# Patient Record
Sex: MALE | Race: WHITE | NOT HISPANIC OR LATINO | Employment: OTHER | URBAN - METROPOLITAN AREA
[De-identification: names, ages, dates, MRNs, and addresses within clinical notes are randomized per-mention and may not be internally consistent; named-entity substitution may affect disease eponyms.]

---

## 2017-01-04 ENCOUNTER — TELEPHONE (OUTPATIENT)
Dept: HEPATOLOGY | Facility: CLINIC | Age: 62
End: 2017-01-04

## 2017-01-04 NOTE — TELEPHONE ENCOUNTER
----- Message from Shivani Monzon sent at 1/3/2017  3:00 PM CST -----  Contact: patient   Patient calling to reschedule his appt to see John in march in  [place]. Please call

## 2017-01-04 NOTE — TELEPHONE ENCOUNTER
MA spoke with patient requesting appt in March. Appt 01/12/2017 cancelled. Pt name put on waiting list for March.

## 2017-03-06 ENCOUNTER — TELEPHONE (OUTPATIENT)
Dept: HEPATOLOGY | Facility: CLINIC | Age: 62
End: 2017-03-06

## 2017-03-06 NOTE — TELEPHONE ENCOUNTER
Received call from pt's wife requesting consult in [facility] for March. They cancelled appt in Jan 2017.  Records are scanned in media. Pre-visit tests done. Please assist with scheduling.

## 2017-03-06 NOTE — TELEPHONE ENCOUNTER
MA spoke with pt's wife, follow up appt schedule 04/13/2017 @ 1pm. Patient address verified, appt letter and questionnaire  mailed to patient.

## 2017-03-07 NOTE — TELEPHONE ENCOUNTER
Noted. Spoke to pt. Since last labs done in 8/2016, will mail him a new order for CBC, CMP, INR, and Fibrosure.  Pt will have labs done at [facility] in [place] at least 2-3 weeks before consult.

## 2017-03-30 NOTE — TELEPHONE ENCOUNTER
CBC and CMP received from [facility]. INR was not done. Quantity was insufficient for Fibrosure.  Results placed in file along with other external records.

## 2017-04-13 ENCOUNTER — INITIAL CONSULT (OUTPATIENT)
Dept: HEPATOLOGY | Facility: CLINIC | Age: 62
End: 2017-04-13
Payer: COMMERCIAL

## 2017-04-13 DIAGNOSIS — B18.2 CHRONIC HEPATITIS C WITHOUT HEPATIC COMA: ICD-10-CM

## 2017-04-13 DIAGNOSIS — I25.10 CORONARY ARTERY DISEASE INVOLVING NATIVE CORONARY ARTERY OF NATIVE HEART WITHOUT ANGINA PECTORIS: ICD-10-CM

## 2017-04-13 DIAGNOSIS — J43.8 OTHER EMPHYSEMA: ICD-10-CM

## 2017-04-13 PROCEDURE — 1160F RVW MEDS BY RX/DR IN RCRD: CPT | Mod: S$GLB,,, | Performed by: INTERNAL MEDICINE

## 2017-04-13 PROCEDURE — 99205 OFFICE O/P NEW HI 60 MIN: CPT | Mod: S$GLB,,, | Performed by: INTERNAL MEDICINE

## 2017-04-13 NOTE — Clinical Note
Recommendations: -  Labs today: AFP, HBcAb, HBsAg, HBsAb, vaccinate if not immune. -  Ultrasound of the abdomen and elastography to be done in ultrasound at main campus.  -  Will wait for new hep C drugs to become available and discuss again with patient after speaking to pulmonologists.  -  Will call Dr Filipe Grijalva [phone] to discuss prognosis of COPD -  Will request assessment of prognosis from Dr Tatianna Garcia as well.  -  Return in 1 month

## 2017-04-13 NOTE — LETTER
April 16, 2017      Julian Schulz MD  [facility] [address]           [place] - Hepatology  [address]  [facility] [address]  Phone: [phone]          Patient: Shaan Brown   MR Number: [id number]   YOB: 1955   Date of Visit: 4/13/2017       Dear Dr. Julian Schulz:    Thank you for referring Shaan Brown to me for evaluation. Attached you will find relevant portions of my assessment and plan of care.    If you have questions, please do not hesitate to call me. I look forward to following Shaan Brown along with you.    Sincerely,    Ramila Lopez MD    Enclosure  CC:  No Recipients    If you would like to receive this communication electronically, please contact [email or web address] or [phone] to request more information on PostedIn Link access.    For providers and/or their staff who would like to refer a patient to [facility], please contact us through our one-stop-shop provider referral line, [facility], at [phone].    If you feel you have received this communication in error or would no longer like to receive these types of communications, please e-mail [email or web address]

## 2017-04-13 NOTE — Clinical Note
Labs: CBC, CMP, AFP, HBcAb, HBsAg, HBsAb, vaccinate if not immune. Get labs same day as u/s visit at main campus.

## 2017-04-16 VITALS
HEIGHT: 71 IN | SYSTOLIC BLOOD PRESSURE: 102 MMHG | BODY MASS INDEX: 41.72 KG/M2 | WEIGHT: 298 LBS | DIASTOLIC BLOOD PRESSURE: 63 MMHG | HEART RATE: 88 BPM

## 2017-04-16 PROBLEM — J44.9 COPD (CHRONIC OBSTRUCTIVE PULMONARY DISEASE): Status: ACTIVE | Noted: 2017-04-16

## 2017-04-16 PROBLEM — I25.10 CAD (CORONARY ARTERY DISEASE): Status: ACTIVE | Noted: 2017-04-16

## 2017-04-16 PROBLEM — B18.2 CHRONIC HEPATITIS C: Status: ACTIVE | Noted: 2017-04-16

## 2017-04-16 NOTE — PROGRESS NOTES
"   [facility] Consultation Note    Reason for Consult:  The encounter diagnosis was Chronic hepatitis C without hepatic coma.    PCP: Mendel Ferguson [address] / [facility] [address]    HPI:  This is a 61 y.o. male here for evaluation of: Candidacy for treatment of Chronic hepatitis C    This is a 61-year-old  male with chronic hepatitis C, genotype 1A, HCV RNA 9.5 million international units per mL on September 13, 2016.  He is known to have hepatitis C for more than 10 years, Patient states he acquired hep C from IV drug use.  He remains treatment naïve because his doctors have been reluctant to treat him due to his multiple comorbidities.  They have felt his pulmonary status could be worsened by hep C treatment.  Also, his hep C could outlast his lung disease.  Patient states his GI doctor Ashish, has told him "hep C treatment will kill him", and his lung doctor Jorje Grijalva ([phone]) has told him that his "lung disease will kill him before his liver disease".      Patient's platelet count was 282 on August 16, 2016 and INR was 1.1 on 8/16/2016.    U/S abd on 9/8/16 showed coarse hepatic echotexture, but no focal mass was seen.  " The visualized portions of systems being are unremarkable".  The hepatic and portal veins are patent.  Size of the spleen was not mentioned possibly because it was a limited ultrasound of the right upper quadrant.    HCV fibrosure test on August the 30, 2016: Score could not be calculated due to high or low value for one or more of the biochemical analytes.  This was bilirubin of 2.0    In addition, he was recently on 9/13/2016, tested for NS5A resistance with result of "resistance possible" to 4 out of 5 available NS5A inhibitors (L31L/M SHAUNA to Daclatasvir, elbasvir, Ledipasvir, Ombitasvir, (not determined against Velpatasvir).    Thus, it will be challenging to treat with currently available drugs.  He would have to wait for new drugs to be approved " which have high efficacy against NS5A RAVs.      Comorbidities include:   -  Coronary artery disease, s/p quadruple coronary artery bypass graft surgery at age 44, and coronary stent placement after a heart attack 2016 at age 61 (went to the ER with palpitations, went into shock, had angiogram which showed 90% blockage of his coronary artery, had a syncopal episode while in the hospital, was resuscitated with chest compressions, and a coronary stent was placed).  During this admission, he was seen by Dr Schulz who did EGD on him, told him he had gastritis, no varices.  Dr Schulz sent him here to see if he could get his hep C treated.   -  COPD diagnosed at age 54, on 24-hour nasal O2, sleeps with BiPAP and oxygen,  he was seen by Dr. Tatianna Garcia on May 13, 2015 for his COPD and shortness of breath, she mentions diastolic dysfunction as well in her note.  She had 6 minute walk test, however, based on Dr. Yasmin Narayanan's note same-day, this test was negative not attempted because prior to walking the patient reported that he had pain in his feet secondary to injections.  His preexercise resting O2 saturation was 80% on 2 L/m of supplemental oxygen, his heart rate was 51 bpm his blood pressure was 99/61.    -  Chronic smoker up to 4 packs a day for 40 years, has stopped smoking.      Elevated liver enzymes: No  Abnormal imaging: No  Cirrhosis: No  Hepatitis C: Yes  Hepatitis B: No  Fatty liver: No  Encephalopathy: No  Post-hospital discharge: No  Symptoms: none related to liver.  Has multiple symptoms related to COPD.     Primary hepatic manifestations:  Fatigue:Yes  Edema:No  Ascites:No  Encephalopathy:No  Abdominal pain:No  GI bleeds: No  Pruritus:No  Weight Changes:lost a few lbs  Changes in Bowel habits: No  Muscle cramps:No    Risk factors for liver disease:  No jaundice  No transfusions  H/o IVDU  Did not snort cocaine or similar agents  Did not live with anyone with hepatitis B or C  Sexual partner not  tested  No hepatotoxic medications  No exposure to industrial toxins  Alcohol: none      ROS:  Constitutional: No fevers, chills, weight down a few pounds, has fatigue  ENT: No allergies, nosebleeds,   CV: No chest pain  Pulm: No cough, has shortness of breath  Ophtho: No vision changes  GI/Liver: see HPI  Derm: No rash, itching  Heme: No swollen glands, bruising  MSK: No joint pains, joint swelling  : No dysuria, hematuria, decrease in urine output  Endo: No hot or cold intolerance  Neuro: No confusion, disorientation, difficulty with sleep, memory, concentration, syncope, seizure  Psych: No anxiety, depression    Medical History:  has a past medical history of Angina, class III; Atherosclerosis of lower extremity with claudication; Bilateral carotid artery stenosis; CAD (coronary artery disease); CAD (coronary artery disease) of artery bypass graft; Chronic hepatitis C; COPD (chronic obstructive pulmonary disease); Hepatitis; HHD (hypertensive heart disease); Hypercholesteremia; Hypoxia; Neuropathy, lower extremity; Obstructive sleep apnea; PAF (paroxysmal atrial fibrillation); Presence of stent in artery; and Syncope.    Surgical History:  has a past surgical history that includes Coronary artery bypass graft; stent in artery; Colonoscopy; and Upper gastrointestinal endoscopy.    Family History: family history includes Liver disease in his father and sister..     Social History:  reports that he quit smoking about 7 years ago. His smoking use included Cigarettes. He has a 160.00 pack-year smoking history. He does not have any smokeless tobacco history on file. He reports that he does not drink alcohol.    Review of patient's allergies indicates:  No Known Allergies    Current Outpatient Prescriptions   Medication Sig    alprazolam (XANAX) 1 MG tablet     cilostazol (PLETAL) 100 MG Tab     clopidogrel (PLAVIX) 75 mg tablet     fluticasone-salmeterol 500-50 mcg/dose (ADVAIR DISKUS) 500-50 mcg/dose DsDv diskus  "inhaler Inhale 1 puff into the lungs 2 (two) times daily.    furosemide (LASIX) 20 MG tablet     gabapentin (NEURONTIN) 300 MG capsule     hydrocodone-acetaminophen 10-325mg (NORCO)  mg Tab     isosorbide mononitrate (IMDUR) 60 MG 24 hr tablet     mupirocin calcium 2% (BACTROBAN) 2 % cream     pantoprazole (PROTONIX) 40 MG tablet     tiotropium (SPIRIVA) 18 mcg inhalation capsule Inhale 18 mcg into the lungs once daily.    tizanidine (ZANAFLEX) 4 MG tablet     ZETIA 10 mg tablet      No current facility-administered medications for this visit.        Objective Findings:    Vital Signs:  /63  Pulse 88  Ht 5' 11" (1.803 m)  Wt 135.2 kg (298 lb)  BMI 41.56 kg/m2  Body mass index is 41.56 kg/(m^2).    Physical Exam:  General Appearance: chronically ill-appearing, with portable nasal O2, in no acute distress  Head:   Normocephalic, without obvious abnormality  Eyes:    No scleral icterus, EOMI  ENT: Neck supple, Lips, mucosa, and tongue normal; teeth and gums normal  Lungs: CTA bilaterally in anterior and posterior fields, no wheezes, no crackles.  Heart:  Regular rate and rhythm, S1, S2 normal, no murmurs heard  Abdomen: Soft, non tender, non distended. No hepatosplenomegaly, ascites, or mass  Extremities: 2+ pulses, no clubbing, cyanosis or edema  Skin: cyanotic appearing ear pinna, No rash  Neurologic: CN II-XII intact, alert, oriented x 3. No asterixis      Labs:  Lab Results   Component Value Date    WBC 6.7 10/19/2016    HGB 17.6 10/19/2016    HCT 52.7 (H) 10/19/2016     (L) 10/19/2016    CREATININE 0.9 10/19/2016    BUN 14 10/19/2016    BILITOT 1.9 (H) 10/19/2016    ALT 42 10/19/2016    AST 37 10/19/2016    ALKPHOS 76 10/19/2016     10/19/2016    K 4.0 10/19/2016     10/19/2016    CO2 29.0 10/19/2016    TSH 0.814 02/11/2015       Imaging:       Endoscopy:      Assessment:  1. Chronic hepatitis C without hepatic coma    -  Chronic hepatitis C genotype 1A viral load 9 " "million international units per mL, treatment naïve, fibrosure test incomplete due to technical problems, Ultrasound did not give the size of the spleen, endoscopy finding did not include finding of varices. Platelet count being normal and INR being normal suggest patient does not appear to have advanced liver disease, and probably not cirrhosis although this is only an assumption.  In order to assess fibrosis stage, he will need further testing with a complete ultrasound and an elastography if possible.     - On 9/13/2016, tested for NS5A resistance with result of "resistance possible" to 4 out of 5 available NS5A inhibitors (L31L/M SHAUNA to Daclatasvir, elbasvir, Ledipasvir, Ombitasvir, (not determined against Velpatasvir).  Patient has an NS5A drug resistance to all currently available hepatitis C meds, except for Epclusa, which was not determined.    Thus, it will be challenging to treat with currently available drugs.  He would have to wait for new drugs to be approved which have high efficacy against NS5A RAVs.       -   Multiple comorbidities including:   -  Coronary artery disease, status post bypass graft surgery, stent placement    -  COPD, dependent on nasal O2 24 hours a day with a resting O2 sat of 80%     I don't believe his lung disease will worsen due to hep C drugs currently in use. But, it is possible his lung disease will limit his life expectancy more than his hep C.       Patient states he does not want to die because of hep C meds.  Although I doubt that would happen, I do respect his concern of any side effect resulting from hep C meds.      Recommendations:  -  Labs: CBC, CMP, AFP, HBcAb, HBsAg, HBsAb, vaccinate if not immune. Get labs same day as u/s visit at [facility].   -  Ultrasound of the abdomen and elastography to be done in ultrasound at [facility].   -  Will wait for new hep C drugs to become available and discuss again with patient after speaking to pulmonologists.   -  Will call " Dr Filipe Grijalva [phone] to discuss prognosis of COPD  -  Will request assessment of prognosis from Dr Tatianna Garcia as well.   -  Return in 1 month      No Follow-up on file.      Order summary:  No orders of the defined types were placed in this encounter.      Thank you so much for allowing me to participate in the care of Shaan Lopez MD

## 2017-04-16 NOTE — PATIENT INSTRUCTIONS
Recommendations:  -    Labs: CBC, CMP, AFP, HBcAb, HBsAg, HBsAb, vaccinate if not immune. Get labs same day as u/s visit at [facility].   -  Ultrasound of the abdomen and elastography to be done in ultrasound at [facility].   -  Will wait for new hep C drugs to become available and discuss again with patient after speaking to pulmonologists.   -  Will call Dr Filipe Grijalva [phone] to discuss prognosis of COPD  -  Will request assessment of prognosis from Dr Tatianna Garcia as well.   -  Return in 1 month

## 2017-04-17 ENCOUNTER — TELEPHONE (OUTPATIENT)
Dept: HEPATOLOGY | Facility: CLINIC | Age: 62
End: 2017-04-17

## 2017-04-17 DIAGNOSIS — B18.2 CHRONIC HEPATITIS C WITHOUT HEPATIC COMA: Primary | ICD-10-CM

## 2017-04-17 NOTE — TELEPHONE ENCOUNTER
Attempted to speak with pt to schedule imaging. Left messages on both numbers listed. Will continue to try and reach pt.

## 2017-04-17 NOTE — TELEPHONE ENCOUNTER
----- Message from Ramila Lopez MD sent at 4/16/2017  7:31 AM CDT -----  Recommendations:  -  Labs today: AFP, HBcAb, HBsAg, HBsAb, vaccinate if not immune.  -  Ultrasound of the abdomen and elastography to be done in ultrasound at main campus.   -  Will wait for new hep C drugs to become available and discuss again with patient after speaking to pulmonologists.   -  Will call Dr Filipe Grijalva [phone] to discuss prognosis of COPD  -  Will request assessment of prognosis from Dr Tatianna Garcia as well.   -  Return in 1 month

## 2017-04-21 ENCOUNTER — HOSPITAL ENCOUNTER (OUTPATIENT)
Dept: RADIOLOGY | Facility: HOSPITAL | Age: 62
Discharge: HOME | End: 2017-04-21
Attending: INTERNAL MEDICINE
Payer: COMMERCIAL

## 2017-04-21 DIAGNOSIS — B18.2 CHRONIC HEPATITIS C WITHOUT HEPATIC COMA: ICD-10-CM

## 2017-04-21 DIAGNOSIS — B18.2 CHRONIC HEPATITIS C WITHOUT HEPATIC COMA: Primary | ICD-10-CM

## 2017-04-21 PROCEDURE — 91200 LIVER ELASTOGRAPHY: CPT | Mod: TC

## 2017-04-21 PROCEDURE — 76700 US EXAM ABDOM COMPLETE: CPT | Mod: 26,59,, | Performed by: RADIOLOGY

## 2017-04-21 PROCEDURE — 93975 VASCULAR STUDY: CPT | Mod: TC

## 2017-04-21 PROCEDURE — 91200 LIVER ELASTOGRAPHY: CPT | Mod: 26,,, | Performed by: RADIOLOGY

## 2017-04-21 PROCEDURE — 93975 VASCULAR STUDY: CPT | Mod: 26,,, | Performed by: RADIOLOGY

## 2017-04-24 ENCOUNTER — TELEPHONE (OUTPATIENT)
Dept: HEPATOLOGY | Facility: CLINIC | Age: 62
End: 2017-04-24

## 2017-04-24 NOTE — TELEPHONE ENCOUNTER
I spoke with patient and his wife and message from Dr. Lopez relayed.  May appt with Dr. Lopez cancelled and recall entered into system for 8/2017.

## 2017-04-24 NOTE — TELEPHONE ENCOUNTER
----- Message from Ramila Lopez MD sent at 4/23/2017  9:39 AM CDT -----  Pl inform patient:  U/s abd showed enlarged liver and enlarged spleen.  There is some fatty change in the liver, which explains enlarged liver.  Due to enlargement of spleen, cannot rule out singnificant scarring in the liver, thus, we have conflicting reports from elastography and ultrasound.  Do not want to do liver biopsy to sort this out, because of him being on Plavix and aspirin.  Therefore, it might be best to treat hep C when new meds available.  Since his NS5A resistance test had shown possible resistance to all currently available hep C meds, I would like to wait until new drugs are approved, which is expected in August this year.  So please place on clinic schedule in [place] in second half of august 2017..

## 2017-04-24 NOTE — TELEPHONE ENCOUNTER
----- Message from Ramila Lopez MD sent at 4/23/2017  9:31 AM CDT -----  Pl inform patient:  elastography showed no scarring of the liver.  If there is some, it is minimal.

## 2017-05-07 PROBLEM — R07.9 CHEST PAIN: Status: ACTIVE | Noted: 2017-05-07

## 2017-05-08 PROBLEM — J96.21 ACUTE ON CHRONIC RESPIRATORY FAILURE WITH HYPOXIA AND HYPERCAPNIA: Status: ACTIVE | Noted: 2017-05-08

## 2017-05-08 PROBLEM — R09.89 PULMONARY VASCULAR CONGESTION: Status: ACTIVE | Noted: 2017-05-08

## 2017-05-08 PROBLEM — G47.33 OSA ON CPAP: Status: ACTIVE | Noted: 2017-05-08

## 2017-05-08 PROBLEM — J96.22 ACUTE ON CHRONIC RESPIRATORY FAILURE WITH HYPOXIA AND HYPERCAPNIA: Status: ACTIVE | Noted: 2017-05-08

## 2017-05-09 PROBLEM — J44.1 CHRONIC OBSTRUCTIVE PULMONARY DISEASE WITH ACUTE EXACERBATION: Status: ACTIVE | Noted: 2017-04-16

## 2017-05-10 PROBLEM — I50.43 ACUTE ON CHRONIC COMBINED SYSTOLIC AND DIASTOLIC HEART FAILURE: Status: ACTIVE | Noted: 2017-05-10

## 2017-05-12 PROBLEM — I50.22 CHRONIC SYSTOLIC CONGESTIVE HEART FAILURE: Status: ACTIVE | Noted: 2017-05-12

## 2017-06-13 PROBLEM — J96.12 CHRONIC RESPIRATORY FAILURE WITH HYPOXIA AND HYPERCAPNIA: Status: ACTIVE | Noted: 2017-05-08

## 2017-06-13 PROBLEM — J96.11 CHRONIC RESPIRATORY FAILURE WITH HYPOXIA AND HYPERCAPNIA: Status: ACTIVE | Noted: 2017-05-08

## 2017-06-13 PROBLEM — E66.2 MORBID OBESITY WITH ALVEOLAR HYPOVENTILATION: Status: ACTIVE | Noted: 2017-06-13

## 2017-07-13 PROBLEM — E11.9 TYPE 2 DIABETES MELLITUS WITHOUT COMPLICATION, WITHOUT LONG-TERM CURRENT USE OF INSULIN: Status: ACTIVE | Noted: 2017-07-13

## 2017-07-13 PROBLEM — I50.32 CHRONIC DIASTOLIC HEART FAILURE: Status: ACTIVE | Noted: 2017-05-10

## 2017-07-13 PROBLEM — R73.9 HYPERGLYCEMIA: Status: ACTIVE | Noted: 2017-07-13

## 2017-07-13 PROBLEM — J18.9 PNEUMONIA OF RIGHT LOWER LOBE DUE TO INFECTIOUS ORGANISM: Status: ACTIVE | Noted: 2017-07-13

## 2017-07-13 PROBLEM — R79.89 ELEVATED TROPONIN: Status: ACTIVE | Noted: 2017-07-13

## 2017-07-14 PROBLEM — E66.9 OBESE: Status: ACTIVE | Noted: 2017-07-14

## 2017-07-15 PROBLEM — R78.81 POSITIVE BLOOD CULTURE: Status: ACTIVE | Noted: 2017-07-15

## 2017-07-16 PROBLEM — E87.6 HYPOKALEMIA: Status: ACTIVE | Noted: 2017-07-16

## 2017-07-17 PROBLEM — K59.00 CONSTIPATION: Status: ACTIVE | Noted: 2017-07-17

## 2017-07-18 PROBLEM — E11.65 TYPE 2 DIABETES MELLITUS WITH HYPERGLYCEMIA: Status: ACTIVE | Noted: 2017-07-13

## 2017-07-25 PROBLEM — I27.20 PULMONARY HYPERTENSION: Status: ACTIVE | Noted: 2017-07-25

## 2017-07-25 PROBLEM — Z79.01: Status: ACTIVE | Noted: 2017-07-25

## 2017-07-25 PROBLEM — I24.89 DEMAND ISCHEMIA: Status: ACTIVE | Noted: 2017-07-13

## 2017-07-27 ENCOUNTER — HOSPITAL ENCOUNTER (INPATIENT)
Facility: HOSPITAL | Age: 62
LOS: 13 days | Discharge: HOME | DRG: 189 | End: 2017-08-09
Attending: HOSPITALIST | Admitting: HOSPITALIST
Payer: MEDICARE

## 2017-07-27 DIAGNOSIS — J96.21 ACUTE ON CHRONIC RESPIRATORY FAILURE WITH HYPOXIA AND HYPERCAPNIA: ICD-10-CM

## 2017-07-27 DIAGNOSIS — J96.22 ACUTE ON CHRONIC RESPIRATORY FAILURE WITH HYPOXIA AND HYPERCAPNIA: ICD-10-CM

## 2017-07-27 DIAGNOSIS — I25.10 CORONARY ARTERY DISEASE INVOLVING NATIVE CORONARY ARTERY OF NATIVE HEART WITHOUT ANGINA PECTORIS: ICD-10-CM

## 2017-07-27 DIAGNOSIS — I27.20 PULMONARY HYPERTENSION: ICD-10-CM

## 2017-07-27 DIAGNOSIS — E66.2 MORBID OBESITY WITH ALVEOLAR HYPOVENTILATION: ICD-10-CM

## 2017-07-27 DIAGNOSIS — I73.9 PERIPHERAL VASCULAR DISEASE: ICD-10-CM

## 2017-07-27 DIAGNOSIS — I50.33 ACUTE ON CHRONIC DIASTOLIC CHF (CONGESTIVE HEART FAILURE): ICD-10-CM

## 2017-07-27 DIAGNOSIS — I27.20 PULMONARY HTN: ICD-10-CM

## 2017-07-27 DIAGNOSIS — G47.33 OSA ON CPAP: ICD-10-CM

## 2017-07-27 DIAGNOSIS — B18.2 CHRONIC VIRAL HEPATITIS C: ICD-10-CM

## 2017-07-27 DIAGNOSIS — I48.0 PAROXYSMAL ATRIAL FIBRILLATION: ICD-10-CM

## 2017-07-27 DIAGNOSIS — R07.9 CHEST PAIN: ICD-10-CM

## 2017-07-27 DIAGNOSIS — J96.90 RESPIRATORY FAILURE: ICD-10-CM

## 2017-07-27 DIAGNOSIS — J44.1 CHRONIC OBSTRUCTIVE PULMONARY DISEASE WITH ACUTE EXACERBATION: ICD-10-CM

## 2017-07-27 PROCEDURE — 11000001 HC ACUTE MED/SURG PRIVATE ROOM

## 2017-07-27 NOTE — PLAN OF CARE
Outside Transfer Acceptance Note    Transferring Physician or Mid Level Provider/Speciality: Dr Tan Robb    Accepting Physician: Randa Combs     Date of Acceptance: 07/27/2017     Code Status: Full    Transferring Facility/Hospital: [facility]    Reason for Transfer to [facility]: Respiratory failure    Report from Transferring Physician or Mid-Level provider/Hospital course: 61 y/o male, with history of chronic mixed hypoxemic and hypercapnic respiratory failure which is multifactorial due to COPD, morbid obesite (BMI =39) with obesity hypoventilation syndrome, and chronic diastolic heart failure.  He is maintained on home O2 via nasal cannula at 4-5 liters continuously and AVAPS with Trilogy machine (Tv goal 550, PEEP 8, Insp Pressure 10-30, with 5L bled in).  He chronically has saturations in the mid to high 90s, however can dip down to 70s with exertion.  He has a history of intermittent non-compliance with diuretic therapy, patient and wife states he has had increased compliance with this.     Pt presented with SOB, increased sputum production, and increasing frequency of hypoxic episodes, associated with visual hallucinations.      Pt is now on Full dose Lovenox for presumed PE, though CTA was negative    - repeat echocardiogram shows significant worsening pulmonary HTN with PA pressure of 71  - after extensive discussion with patient and Pulmonologist, Dr. Booker, patient may benefit from transfer to [facility] for further evaluation by pulmonology-hypertension specialist    Case discussed with  Dr. Kali Niño     To do list upon patient arrival: Consult Pulmonology    Please call extension [phone] upon patient arrival to floor for Hospital Medicine admit team assignment and for additional admit orders. If patient is coming from another [facility] facility please also call [phone] to inform the admit team/office that patient has arrived from the [facility] facility to the floor so patient can be  evaluated.

## 2017-07-28 PROBLEM — I48.0 PAROXYSMAL ATRIAL FIBRILLATION: Status: ACTIVE | Noted: 2017-07-28

## 2017-07-28 PROBLEM — I50.33 ACUTE ON CHRONIC DIASTOLIC CHF (CONGESTIVE HEART FAILURE): Status: ACTIVE | Noted: 2017-05-10

## 2017-07-28 LAB
ALBUMIN SERPL BCP-MCNC: 2.9 G/DL
ALBUMIN SERPL BCP-MCNC: 3 G/DL
ALP SERPL-CCNC: 75 U/L
ALP SERPL-CCNC: 86 U/L
ALT SERPL W/O P-5'-P-CCNC: 26 U/L
ALT SERPL W/O P-5'-P-CCNC: 26 U/L
ANION GAP SERPL CALC-SCNC: 11 MMOL/L
ANION GAP SERPL CALC-SCNC: 8 MMOL/L
AST SERPL-CCNC: 16 U/L
AST SERPL-CCNC: 18 U/L
BASOPHILS # BLD AUTO: 0 K/UL
BASOPHILS # BLD AUTO: 0.01 K/UL
BASOPHILS NFR BLD: 0 %
BASOPHILS NFR BLD: 0.1 %
BILIRUB SERPL-MCNC: 1.4 MG/DL
BILIRUB SERPL-MCNC: 1.6 MG/DL
BNP SERPL-MCNC: 881 PG/ML
BUN SERPL-MCNC: 26 MG/DL
BUN SERPL-MCNC: 27 MG/DL
CALCIUM SERPL-MCNC: 9.4 MG/DL
CALCIUM SERPL-MCNC: 9.5 MG/DL
CHLORIDE SERPL-SCNC: 101 MMOL/L
CHLORIDE SERPL-SCNC: 101 MMOL/L
CO2 SERPL-SCNC: 29 MMOL/L
CO2 SERPL-SCNC: 33 MMOL/L
CREAT SERPL-MCNC: 1.1 MG/DL
CREAT SERPL-MCNC: 1.2 MG/DL
DIFFERENTIAL METHOD: ABNORMAL
DIFFERENTIAL METHOD: ABNORMAL
DIGOXIN SERPL-MCNC: 0.2 NG/ML
EOSINOPHIL # BLD AUTO: 0 K/UL
EOSINOPHIL # BLD AUTO: 0 K/UL
EOSINOPHIL NFR BLD: 0 %
EOSINOPHIL NFR BLD: 0 %
ERYTHROCYTE [DISTWIDTH] IN BLOOD BY AUTOMATED COUNT: 22.4 %
ERYTHROCYTE [DISTWIDTH] IN BLOOD BY AUTOMATED COUNT: 22.8 %
EST. GFR  (AFRICAN AMERICAN): >60 ML/MIN/1.73 M^2
EST. GFR  (AFRICAN AMERICAN): >60 ML/MIN/1.73 M^2
EST. GFR  (NON AFRICAN AMERICAN): >60 ML/MIN/1.73 M^2
EST. GFR  (NON AFRICAN AMERICAN): >60 ML/MIN/1.73 M^2
GLUCOSE SERPL-MCNC: 110 MG/DL
GLUCOSE SERPL-MCNC: 123 MG/DL
HCT VFR BLD AUTO: 45 %
HCT VFR BLD AUTO: 45.8 %
HGB BLD-MCNC: 14.5 G/DL
HGB BLD-MCNC: 14.9 G/DL
INR PPP: 1.2
INR PPP: 1.3
LYMPHOCYTES # BLD AUTO: 1.1 K/UL
LYMPHOCYTES # BLD AUTO: 1.4 K/UL
LYMPHOCYTES NFR BLD: 10.9 %
LYMPHOCYTES NFR BLD: 13.7 %
MAGNESIUM SERPL-MCNC: 2.3 MG/DL
MCH RBC QN AUTO: 29.8 PG
MCH RBC QN AUTO: 29.9 PG
MCHC RBC AUTO-ENTMCNC: 32.2 G/DL
MCHC RBC AUTO-ENTMCNC: 32.5 G/DL
MCV RBC AUTO: 92 FL
MCV RBC AUTO: 93 FL
MONOCYTES # BLD AUTO: 0.7 K/UL
MONOCYTES # BLD AUTO: 0.8 K/UL
MONOCYTES NFR BLD: 7.2 %
MONOCYTES NFR BLD: 7.9 %
NEUTROPHILS # BLD AUTO: 7.8 K/UL
NEUTROPHILS # BLD AUTO: 7.9 K/UL
NEUTROPHILS NFR BLD: 77.8 %
NEUTROPHILS NFR BLD: 81.6 %
PLATELET # BLD AUTO: 200 K/UL
PLATELET # BLD AUTO: 218 K/UL
PMV BLD AUTO: 9.8 FL
PMV BLD AUTO: 9.8 FL
POCT GLUCOSE: 118 MG/DL (ref 70–110)
POCT GLUCOSE: 118 MG/DL (ref 70–110)
POCT GLUCOSE: 120 MG/DL (ref 70–110)
POCT GLUCOSE: 124 MG/DL (ref 70–110)
POTASSIUM SERPL-SCNC: 4.1 MMOL/L
POTASSIUM SERPL-SCNC: 5.1 MMOL/L
PROT SERPL-MCNC: 6.5 G/DL
PROT SERPL-MCNC: 6.7 G/DL
PROTHROMBIN TIME: 12.7 SEC
PROTHROMBIN TIME: 13.2 SEC
RBC # BLD AUTO: 4.85 M/UL
RBC # BLD AUTO: 5 M/UL
SODIUM SERPL-SCNC: 141 MMOL/L
SODIUM SERPL-SCNC: 142 MMOL/L
WBC # BLD AUTO: 10.08 K/UL
WBC # BLD AUTO: 9.72 K/UL

## 2017-07-28 PROCEDURE — 27000221 HC OXYGEN, UP TO 24 HOURS

## 2017-07-28 PROCEDURE — 25000003 PHARM REV CODE 250: Performed by: STUDENT IN AN ORGANIZED HEALTH CARE EDUCATION/TRAINING PROGRAM

## 2017-07-28 PROCEDURE — 11000001 HC ACUTE MED/SURG PRIVATE ROOM

## 2017-07-28 PROCEDURE — 80162 ASSAY OF DIGOXIN TOTAL: CPT

## 2017-07-28 PROCEDURE — 36415 COLL VENOUS BLD VENIPUNCTURE: CPT

## 2017-07-28 PROCEDURE — 63600175 PHARM REV CODE 636 W HCPCS: Performed by: INTERNAL MEDICINE

## 2017-07-28 PROCEDURE — 94640 AIRWAY INHALATION TREATMENT: CPT

## 2017-07-28 PROCEDURE — 25000003 PHARM REV CODE 250: Performed by: INTERNAL MEDICINE

## 2017-07-28 PROCEDURE — 99223 1ST HOSP IP/OBS HIGH 75: CPT | Mod: ,,, | Performed by: INTERNAL MEDICINE

## 2017-07-28 PROCEDURE — 94761 N-INVAS EAR/PLS OXIMETRY MLT: CPT

## 2017-07-28 PROCEDURE — 83735 ASSAY OF MAGNESIUM: CPT

## 2017-07-28 PROCEDURE — 99222 1ST HOSP IP/OBS MODERATE 55: CPT | Mod: AI,GC,, | Performed by: HOSPITALIST

## 2017-07-28 PROCEDURE — 85025 COMPLETE CBC W/AUTO DIFF WBC: CPT

## 2017-07-28 PROCEDURE — 85610 PROTHROMBIN TIME: CPT | Mod: 91

## 2017-07-28 PROCEDURE — 80053 COMPREHEN METABOLIC PANEL: CPT

## 2017-07-28 PROCEDURE — 63600175 PHARM REV CODE 636 W HCPCS: Performed by: STUDENT IN AN ORGANIZED HEALTH CARE EDUCATION/TRAINING PROGRAM

## 2017-07-28 PROCEDURE — 25000242 PHARM REV CODE 250 ALT 637 W/ HCPCS: Performed by: INTERNAL MEDICINE

## 2017-07-28 PROCEDURE — 99223 1ST HOSP IP/OBS HIGH 75: CPT | Mod: GC,,, | Performed by: INTERNAL MEDICINE

## 2017-07-28 PROCEDURE — 83880 ASSAY OF NATRIURETIC PEPTIDE: CPT

## 2017-07-28 RX ORDER — DIGOXIN 125 MCG
0.12 TABLET ORAL DAILY
Status: DISCONTINUED | OUTPATIENT
Start: 2017-07-28 | End: 2017-08-09 | Stop reason: HOSPADM

## 2017-07-28 RX ORDER — PANTOPRAZOLE SODIUM 40 MG/1
40 TABLET, DELAYED RELEASE ORAL DAILY
Status: DISCONTINUED | OUTPATIENT
Start: 2017-07-28 | End: 2017-08-09 | Stop reason: HOSPADM

## 2017-07-28 RX ORDER — GLUCAGON 1 MG
1 KIT INJECTION
Status: DISCONTINUED | OUTPATIENT
Start: 2017-07-28 | End: 2017-08-09 | Stop reason: HOSPADM

## 2017-07-28 RX ORDER — ALPRAZOLAM 1 MG/1
1 TABLET ORAL 2 TIMES DAILY PRN
Status: DISCONTINUED | OUTPATIENT
Start: 2017-07-28 | End: 2017-07-30

## 2017-07-28 RX ORDER — ATORVASTATIN CALCIUM 20 MG/1
80 TABLET, FILM COATED ORAL DAILY
Status: DISCONTINUED | OUTPATIENT
Start: 2017-07-28 | End: 2017-08-09 | Stop reason: HOSPADM

## 2017-07-28 RX ORDER — IBUPROFEN 200 MG
24 TABLET ORAL
Status: DISCONTINUED | OUTPATIENT
Start: 2017-07-28 | End: 2017-08-09 | Stop reason: HOSPADM

## 2017-07-28 RX ORDER — ENOXAPARIN SODIUM 100 MG/ML
40 INJECTION SUBCUTANEOUS EVERY 12 HOURS
Status: DISCONTINUED | OUTPATIENT
Start: 2017-07-28 | End: 2017-08-01

## 2017-07-28 RX ORDER — GABAPENTIN 300 MG/1
600 CAPSULE ORAL 3 TIMES DAILY
Status: DISCONTINUED | OUTPATIENT
Start: 2017-07-28 | End: 2017-08-09 | Stop reason: HOSPADM

## 2017-07-28 RX ORDER — GUAIFENESIN 600 MG/1
1200 TABLET, EXTENDED RELEASE ORAL 2 TIMES DAILY
Status: DISCONTINUED | OUTPATIENT
Start: 2017-07-28 | End: 2017-08-05

## 2017-07-28 RX ORDER — POLYETHYLENE GLYCOL 3350 17 G/17G
17 POWDER, FOR SOLUTION ORAL DAILY
Status: DISCONTINUED | OUTPATIENT
Start: 2017-07-28 | End: 2017-08-09 | Stop reason: HOSPADM

## 2017-07-28 RX ORDER — IPRATROPIUM BROMIDE AND ALBUTEROL SULFATE 2.5; .5 MG/3ML; MG/3ML
3 SOLUTION RESPIRATORY (INHALATION)
Status: DISCONTINUED | OUTPATIENT
Start: 2017-07-28 | End: 2017-08-02

## 2017-07-28 RX ORDER — DIPHENHYDRAMINE HCL 25 MG
25 CAPSULE ORAL ONCE
Status: COMPLETED | OUTPATIENT
Start: 2017-07-28 | End: 2017-07-28

## 2017-07-28 RX ORDER — FUROSEMIDE 10 MG/ML
40 INJECTION INTRAMUSCULAR; INTRAVENOUS 2 TIMES DAILY
Status: DISCONTINUED | OUTPATIENT
Start: 2017-07-28 | End: 2017-07-29

## 2017-07-28 RX ORDER — IBUPROFEN 200 MG
16 TABLET ORAL
Status: DISCONTINUED | OUTPATIENT
Start: 2017-07-28 | End: 2017-08-09 | Stop reason: HOSPADM

## 2017-07-28 RX ORDER — HYDROCODONE BITARTRATE AND ACETAMINOPHEN 10; 325 MG/1; MG/1
1 TABLET ORAL EVERY 6 HOURS PRN
Status: DISCONTINUED | OUTPATIENT
Start: 2017-07-28 | End: 2017-07-29

## 2017-07-28 RX ORDER — NITROGLYCERIN 0.4 MG/1
0.4 TABLET SUBLINGUAL EVERY 5 MIN PRN
Status: DISCONTINUED | OUTPATIENT
Start: 2017-07-28 | End: 2017-08-09 | Stop reason: HOSPADM

## 2017-07-28 RX ORDER — TAMSULOSIN HYDROCHLORIDE 0.4 MG/1
0.4 CAPSULE ORAL DAILY
Status: DISCONTINUED | OUTPATIENT
Start: 2017-07-28 | End: 2017-08-09 | Stop reason: HOSPADM

## 2017-07-28 RX ORDER — ISOSORBIDE MONONITRATE 30 MG/1
30 TABLET, EXTENDED RELEASE ORAL DAILY
Status: DISCONTINUED | OUTPATIENT
Start: 2017-07-28 | End: 2017-08-09 | Stop reason: HOSPADM

## 2017-07-28 RX ORDER — ASPIRIN 81 MG/1
81 TABLET ORAL DAILY
Status: DISCONTINUED | OUTPATIENT
Start: 2017-07-28 | End: 2017-08-09 | Stop reason: HOSPADM

## 2017-07-28 RX ORDER — LANOLIN ALCOHOL/MO/W.PET/CERES
400 CREAM (GRAM) TOPICAL 2 TIMES DAILY
Status: DISCONTINUED | OUTPATIENT
Start: 2017-07-28 | End: 2017-08-09 | Stop reason: HOSPADM

## 2017-07-28 RX ORDER — WARFARIN SODIUM 5 MG/1
5 TABLET ORAL DAILY
Status: DISCONTINUED | OUTPATIENT
Start: 2017-07-29 | End: 2017-07-28

## 2017-07-28 RX ORDER — TIZANIDINE 4 MG/1
4 TABLET ORAL NIGHTLY PRN
Status: DISCONTINUED | OUTPATIENT
Start: 2017-07-28 | End: 2017-07-30

## 2017-07-28 RX ORDER — LIDOCAINE 50 MG/G
1 PATCH TOPICAL
Status: DISCONTINUED | OUTPATIENT
Start: 2017-07-28 | End: 2017-07-29

## 2017-07-28 RX ORDER — INSULIN ASPART 100 [IU]/ML
0-5 INJECTION, SOLUTION INTRAVENOUS; SUBCUTANEOUS
Status: DISCONTINUED | OUTPATIENT
Start: 2017-07-28 | End: 2017-08-09 | Stop reason: HOSPADM

## 2017-07-28 RX ORDER — RANOLAZINE 500 MG/1
500 TABLET, EXTENDED RELEASE ORAL 2 TIMES DAILY
Status: DISCONTINUED | OUTPATIENT
Start: 2017-07-28 | End: 2017-08-07

## 2017-07-28 RX ORDER — ENOXAPARIN SODIUM 100 MG/ML
1 INJECTION SUBCUTANEOUS
Status: DISCONTINUED | OUTPATIENT
Start: 2017-07-28 | End: 2017-07-28

## 2017-07-28 RX ORDER — BUDESONIDE 0.25 MG/2ML
0.5 INHALANT ORAL EVERY 12 HOURS
Status: DISCONTINUED | OUTPATIENT
Start: 2017-07-28 | End: 2017-08-01 | Stop reason: ALTCHOICE

## 2017-07-28 RX ORDER — ACETAMINOPHEN 325 MG/1
650 TABLET ORAL EVERY 6 HOURS PRN
Status: DISCONTINUED | OUTPATIENT
Start: 2017-07-28 | End: 2017-08-09 | Stop reason: HOSPADM

## 2017-07-28 RX ADMIN — IPRATROPIUM BROMIDE AND ALBUTEROL SULFATE 3 ML: .5; 3 SOLUTION RESPIRATORY (INHALATION) at 08:07

## 2017-07-28 RX ADMIN — HYDROCODONE BITARTRATE AND ACETAMINOPHEN 1 TABLET: 10; 325 TABLET ORAL at 12:07

## 2017-07-28 RX ADMIN — Medication 1 CAPSULE: at 08:07

## 2017-07-28 RX ADMIN — GUAIFENESIN 1200 MG: 600 TABLET, EXTENDED RELEASE ORAL at 08:07

## 2017-07-28 RX ADMIN — ISOSORBIDE MONONITRATE 30 MG: 30 TABLET ORAL at 08:07

## 2017-07-28 RX ADMIN — DIGOXIN 0.12 MG: 125 TABLET ORAL at 08:07

## 2017-07-28 RX ADMIN — IPRATROPIUM BROMIDE AND ALBUTEROL SULFATE 3 ML: .5; 3 SOLUTION RESPIRATORY (INHALATION) at 09:07

## 2017-07-28 RX ADMIN — LIDOCAINE 1 PATCH: 50 PATCH TOPICAL at 12:07

## 2017-07-28 RX ADMIN — ENOXAPARIN SODIUM 130 MG: 100 INJECTION SUBCUTANEOUS at 02:07

## 2017-07-28 RX ADMIN — LIDOCAINE 1 PATCH: 50 PATCH TOPICAL at 09:07

## 2017-07-28 RX ADMIN — RANOLAZINE 500 MG: 500 TABLET, FILM COATED, EXTENDED RELEASE ORAL at 08:07

## 2017-07-28 RX ADMIN — FUROSEMIDE 40 MG: 10 INJECTION, SOLUTION INTRAVENOUS at 08:07

## 2017-07-28 RX ADMIN — MAGNESIUM OXIDE TAB 400 MG (241.3 MG ELEMENTAL MG) 400 MG: 400 (241.3 MG) TAB at 09:07

## 2017-07-28 RX ADMIN — ENOXAPARIN SODIUM 40 MG: 100 INJECTION SUBCUTANEOUS at 09:07

## 2017-07-28 RX ADMIN — ALPRAZOLAM 1 MG: 1 TABLET ORAL at 09:07

## 2017-07-28 RX ADMIN — DIPHENHYDRAMINE HYDROCHLORIDE 25 MG: 25 CAPSULE ORAL at 02:07

## 2017-07-28 RX ADMIN — POLYETHYLENE GLYCOL 3350 17 G: 17 POWDER, FOR SOLUTION ORAL at 08:07

## 2017-07-28 RX ADMIN — TIZANIDINE 4 MG: 4 TABLET ORAL at 09:07

## 2017-07-28 RX ADMIN — RANOLAZINE 500 MG: 500 TABLET, FILM COATED, EXTENDED RELEASE ORAL at 09:07

## 2017-07-28 RX ADMIN — IPRATROPIUM BROMIDE AND ALBUTEROL SULFATE 3 ML: .5; 3 SOLUTION RESPIRATORY (INHALATION) at 01:07

## 2017-07-28 RX ADMIN — TAMSULOSIN HYDROCHLORIDE 0.4 MG: 0.4 CAPSULE ORAL at 08:07

## 2017-07-28 RX ADMIN — GABAPENTIN 600 MG: 300 CAPSULE ORAL at 09:07

## 2017-07-28 RX ADMIN — HYDROCODONE BITARTRATE AND ACETAMINOPHEN 1 TABLET: 10; 325 TABLET ORAL at 10:07

## 2017-07-28 RX ADMIN — PANTOPRAZOLE SODIUM 40 MG: 40 TABLET, DELAYED RELEASE ORAL at 08:07

## 2017-07-28 RX ADMIN — GABAPENTIN 600 MG: 300 CAPSULE ORAL at 02:07

## 2017-07-28 RX ADMIN — GABAPENTIN 600 MG: 300 CAPSULE ORAL at 07:07

## 2017-07-28 RX ADMIN — GUAIFENESIN 1200 MG: 600 TABLET, EXTENDED RELEASE ORAL at 09:07

## 2017-07-28 RX ADMIN — MAGNESIUM OXIDE TAB 400 MG (241.3 MG ELEMENTAL MG) 400 MG: 400 (241.3 MG) TAB at 08:07

## 2017-07-28 RX ADMIN — FUROSEMIDE 40 MG: 10 INJECTION, SOLUTION INTRAVENOUS at 05:07

## 2017-07-28 RX ADMIN — ENOXAPARIN SODIUM 130 MG: 100 INJECTION SUBCUTANEOUS at 12:07

## 2017-07-28 RX ADMIN — ASPIRIN 81 MG: 81 TABLET, COATED ORAL at 08:07

## 2017-07-28 RX ADMIN — ATORVASTATIN CALCIUM 80 MG: 20 TABLET, FILM COATED ORAL at 08:07

## 2017-07-28 NOTE — ASSESSMENT & PLAN NOTE
Patient seen by hepatology at [facility] and in discussion regarding treatment.  Appears that his lung disease is the ultimately limiting factor for treatment (his concerns regarding current treatments and effects on pulmonary status, as well as high resistance to of hepC strain to currently available regimens).  Patient scheduled to f/u with Hepatology August 2017 with Dr. Lopez.

## 2017-07-28 NOTE — HOSPITAL COURSE
Transferred from [facility] to [facility] IM-1 service for evaluation of worsening pulmonary HTN. He was continued on scheduled duonebs, budesonide full dose lovenox. Diuresis with lasix 40 IV BID was started. Pulmonology was consulted and recommended continued diuresis and aerosol treatments, heart transplant consult for PHtn, and stopping full dose AC. Heart transplant was consulted and recommended repeating the ECHO following aggressive diuresis. Pt c/o L chest wall pain that he reports began after he fell onto an O2 container. On review of CTA from 7/24, pt has multiple fractured ribs on L side; PRN pain medication ordered. He also reported episodes of chest pain during this admission which were consistent with previous episodes of angina; workup not concerning for infarction. Lasix IVP switched to lasix gtt to improve diuresis. ECHO 7/31 showed normal EF and elevated estimated PASP, but was unable to accurately determine PA pressure due to inadequate TR jet. Continued on lasix gtt; rate increased from 10 cc/hr to 20cc/hr for more adequate diuresis. RHC done on 8/4 showed moderately reduced CO/CI, mild to moderate elevated in right and mild elevation in left sided filling pressure and severe pulmonary hypertension. Deescalated lasix from drip to IV, then to PO after adequate diuresis. PFTs ordered - pt will need to be weaned to 2 L O2 before they can be performed. Spoke with his outpatient cardiologist and simplified his home medication list.

## 2017-07-28 NOTE — PLAN OF CARE
Problem: Patient Care Overview  Goal: Plan of Care Review  Outcome: Ongoing (interventions implemented as appropriate)  AOx4, O2 91% on home CPAP other VSS. Medicated x1 for pain and one time benadryl given to assist with sleep.     Problem: Fall Risk (Adult)  Goal: Identify Related Risk Factors and Signs and Symptoms  Related risk factors and signs and symptoms are identified upon initiation of [employer] Clinical Practice Guideline (CPG)   Outcome: Ongoing (interventions implemented as appropriate)  No falls or trauma during shift bed in lowest position with wheels locked and call light in reach.

## 2017-07-28 NOTE — SUBJECTIVE & OBJECTIVE
Past Medical History:   Diagnosis Date    Angina, class III     Atherosclerosis of lower extremity with claudication     Bilateral carotid artery stenosis     CAD (coronary artery disease)     CAD (coronary artery disease) of artery bypass graft     Chronic hepatitis C     COPD (chronic obstructive pulmonary disease)     Hepatitis     HHD (hypertensive heart disease)     Hypercholesteremia     Hypoxia     Neuropathy, lower extremity     Obstructive sleep apnea     PAF (paroxysmal atrial fibrillation)     Presence of stent in artery     Syncope        Past Surgical History:   Procedure Laterality Date    COLONOSCOPY      CORONARY ARTERY BYPASS GRAFT      stent in artery      UPPER GASTROINTESTINAL ENDOSCOPY         Review of patient's allergies indicates:  No Known Allergies    Current Facility-Administered Medications on File Prior to Encounter   Medication    [DISCONTINUED] acetaminophen tablet 650 mg    [DISCONTINUED] albuterol-ipratropium 2.5mg-0.5mg/3mL nebulizer solution 3 mL    [DISCONTINUED] alprazolam tablet 1 mg    [DISCONTINUED] aspirin chewable tablet 81 mg    [DISCONTINUED] atorvastatin tablet 80 mg    [DISCONTINUED] budesonide nebulizer solution 0.5 mg    [DISCONTINUED] dextrose 50% injection 12.5 g    [DISCONTINUED] dextrose 50% injection 25 g    [DISCONTINUED] digoxin tablet 0.125 mg    [DISCONTINUED] enoxaparin injection 130 mg    [DISCONTINUED] furosemide injection 40 mg    [DISCONTINUED] gabapentin capsule 600 mg    [DISCONTINUED] glucagon (human recombinant) injection 1 mg    [DISCONTINUED] glucose chewable tablet 16 g    [DISCONTINUED] glucose chewable tablet 24 g    [DISCONTINUED] guaifenesin 12 hr tablet 1,200 mg    [DISCONTINUED] hydrocodone-acetaminophen 10-325mg per tablet 1 tablet    [DISCONTINUED] insulin aspart injection 1-10 Units    [DISCONTINUED] insulin detemir injection 15 Units    [DISCONTINUED] insulin detemir injection 17 Units     [DISCONTINUED] isosorbide mononitrate 24 hr tablet 30 mg    [DISCONTINUED] L.acidophil,parac-S.therm-Bif. (Risaquad) Cap 1 capsule    [DISCONTINUED] lidocaine 5 % patch 1 patch    [DISCONTINUED] magnesium oxide tablet 400 mg    [DISCONTINUED] methylPREDNISolone sodium succinate injection 40 mg    [DISCONTINUED] morphine injection 8 mg    [DISCONTINUED] pantoprazole EC tablet 40 mg    [DISCONTINUED] polyethylene glycol packet 17 g    [DISCONTINUED] ranolazine 12 hr tablet 500 mg    [DISCONTINUED] spironolactone tablet 25 mg    [DISCONTINUED] tamsulosin 24 hr capsule 0.4 mg    [DISCONTINUED] tizanidine tablet 4 mg    [DISCONTINUED] warfarin (COUMADIN) tablet 5 mg     Current Outpatient Prescriptions on File Prior to Encounter   Medication Sig    ADVAIR DISKUS 250-50 mcg/dose diskus inhaler Inhale 1 puff into the lungs 2 (two) times daily.    albuterol (PROAIR HFA) 90 mcg/actuation inhaler Inhale 1 puff into the lungs 2 (two) times daily. Rescue    albuterol (PROVENTIL) 5 mg/mL nebulizer solution Take 2.5 mg by nebulization 3 (three) times daily. Rescue    alprazolam (XANAX) 1 MG tablet Take 1 mg by mouth 2 (two) times daily as needed for Anxiety.     amiodarone (PACERONE) 100 MG Tab Take 1 tablet (100 mg total) by mouth once daily.    aspirin 81 MG Chew Take 81 mg by mouth once daily.    atorvastatin (LIPITOR) 40 MG tablet Take 2 tablets (80 mg total) by mouth once daily.    carvedilol (COREG) 3.125 MG tablet Take 3.125 mg by mouth 2 (two) times daily.    cholecalciferol, vitamin D3, (VITAMIN D3) 400 unit Chew Take 400 Units by mouth once daily.    cilostazol (PLETAL) 100 MG Tab Take 100 mg by mouth 2 (two) times daily.     clopidogrel (PLAVIX) 75 mg tablet Take 75 mg by mouth once daily.     fish oil-omega-3 fatty acids 300-1,000 mg capsule Take 1 capsule by mouth 2 (two) times daily.     furosemide (LASIX) 40 MG tablet Take 1 tablet by mouth 2 (two) times daily.    gabapentin (NEURONTIN)  300 MG capsule Take 3 capsules (900 mg total) by mouth every evening.    hydrocodone-acetaminophen 10-325mg (NORCO)  mg Tab Take 1 tablet by mouth 2 (two) times daily as needed.     insulin detemir (LEVEMIR) 100 unit/mL injection Inject 15 Units into the skin 2 (two) times daily.    isosorbide mononitrate (IMDUR) 30 MG 24 hr tablet Take 1 tablet (30 mg total) by mouth once daily.    nitroGLYCERIN (NITROSTAT) 0.4 MG SL tablet Place 0.4 mg under the tongue every 5 (five) minutes as needed for Chest pain.    pantoprazole (PROTONIX) 40 MG tablet 40 mg 2 (two) times daily.     ranolazine (RANEXA) 500 MG Tb12 Take 1 tablet (500 mg total) by mouth 2 (two) times daily.    SPIRIVA RESPIMAT 2.5 mcg/actuation Mist Inhale 2 puffs into the lungs once daily at 6am.    spironolactone (ALDACTONE) 25 MG tablet Take 1 tablet (25 mg total) by mouth once daily.    sucralfate (CARAFATE) 1 gram tablet Take 1 tablet by mouth 4 (four) times daily before meals and nightly.    tamsulosin (FLOMAX) 0.4 mg Cp24 Take 1 capsule (0.4 mg total) by mouth once daily.    tizanidine (ZANAFLEX) 4 MG tablet Take 6 mg by mouth every 8 (eight) hours. Take one and one half tablet    warfarin (COUMADIN) 5 MG tablet Take 1 tablet (5 mg total) by mouth Daily.     Family History     Problem Relation (Age of Onset)    Liver disease Father, Sister        Social History Main Topics    Smoking status: Former Smoker     Packs/day: 4.00     Years: 40.00     Types: Cigarettes     Start date: 1968     Quit date: 6/11/2009    Smokeless tobacco: Never Used    Alcohol use No    Drug use: No    Sexual activity: Not on file     Review of Systems   Constitutional: Negative for chills, diaphoresis and fever.   HENT: Negative for congestion and sinus pressure.    Eyes: Negative for photophobia and visual disturbance.   Respiratory: Positive for cough, shortness of breath and wheezing.    Cardiovascular: Positive for leg swelling. Negative for chest pain.    Gastrointestinal: Negative for abdominal distention, abdominal pain, diarrhea and nausea.   Endocrine: Negative for cold intolerance and heat intolerance.   Genitourinary: Negative for dysuria.   Musculoskeletal: Positive for arthralgias and back pain. Negative for myalgias.   Neurological: Negative for facial asymmetry and weakness.   Psychiatric/Behavioral: Negative for confusion.     Objective:     Vital Signs (Most Recent):  Temp: 98.6 °F (37 °C) (07/27/17 2300)  Pulse: 99 (07/27/17 2300)  Resp: (!) 22 (07/27/17 2300)  BP: 110/60 (07/27/17 2300)  SpO2: (!) 86 % (07/27/17 2300) Vital Signs (24h Range):  Temp:  [97.4 °F (36.3 °C)-98.6 °F (37 °C)] 98.6 °F (37 °C)  Pulse:  [50-99] 99  Resp:  [16-22] 22  SpO2:  [81 %-89 %] 86 %  BP: ()/(52-70) 110/60     Weight: 129 kg (284 lb 6.3 oz)  Body mass index is 39.66 kg/m².    Physical Exam   Constitutional: He is oriented to person, place, and time. He appears well-developed and well-nourished. No distress.   HENT:   Head: Normocephalic and atraumatic.   Eyes: Conjunctivae and EOM are normal. Pupils are equal, round, and reactive to light. No scleral icterus.   Neck: Normal range of motion. Neck supple.   Cardiovascular: Normal heart sounds and intact distal pulses.  A regularly irregular rhythm present. Tachycardia present.    Pulmonary/Chest: Effort normal. He has wheezes. He has rales. He exhibits no tenderness.   Abdominal: Soft. Bowel sounds are normal. He exhibits no distension. There is no tenderness.   Musculoskeletal: He exhibits edema and tenderness.   Neurological: He is alert and oriented to person, place, and time.   Skin: Skin is warm and dry. Capillary refill takes less than 2 seconds. He is not diaphoretic.        Significant Labs:   CBC:   Recent Labs  Lab 07/26/17  0639 07/27/17  0504 07/28/17  0106   WBC 8.30 7.00 9.72   HGB 13.2* 13.9* 14.9   HCT 40.4 41.7 45.8    191 218     CMP:   Recent Labs  Lab 07/26/17  0639 07/27/17  050  07/28/17  0106    137 141   K 3.6 4.9 4.1   CL 97 95 101   CO2 32* 32* 29   GLU 97 179* 123*   BUN 22* 25* 26*   CREATININE 1.00 1.10 1.2   CALCIUM 8.6 9.4 9.5   PROT 6.3 6.6 6.7   ALBUMIN 3.2* 3.5 3.0*   BILITOT 1.5* 1.6* 1.6*   ALKPHOS 65 77 86   AST 28 27 16   ALT 39 40 26   ANIONGAP  --   --  11   EGFRNONAA >60 >60 >60.0       Significant Imaging: I have reviewed all pertinent imaging results/findings within the past 24 hours.

## 2017-07-28 NOTE — H&P
[facility] Hospital Medicine  History & Physical    Patient Name: Shaan Brown  MRN: [id number]  Admission Date: 7/27/2017  Attending Physician: Randa Combs MD   Primary Care Provider: Mendel Ferguson Iii, MD    [place] [facility] [place] 1 Alistair Perez DO     Patient information was obtained from patient, past medical records and ER records.     Subjective:     Principal Problem:Acute on chronic respiratory failure with hypoxia and hypercapnia    Chief Complaint: No chief complaint on file.       HPI: Shaan Brown is a 62-year-old male w/ chronic mixed hypercapnia and hypoxemia 2/2 COPD, HFpEF, HATTIE and obesity hypoventilation who's on 4-5 liters of continuous supplemental O2 (SpO2 typically in the low 90s-80s)  w/ Trilogy at night. He presented to [facility] on 7/13 via EMS for worsening hypoxia and altered mental status. Initially admitted to the ICU. Worsening hypoxia was thought to be 2/2 to pulmonary HTN w/ PA pressures of 71. Bubble study was negative for shunt. Pt was also started on therapeutic Lovenox and was being bridged to warfarin for suspected PE, despite negative CTA. Finished abx treatment for RLL PNA. Diuretics were held 2/2 to hypotension. Pt has what looks to be a NSTEMI 2/2 to demand, however patient was not tolerate of beta-blocker therapy 2/2 to hypoxia. No improvement in hypoxia despite steroids. [place] [facility] [place] for pulmonary HTN.   On arrive, pt was hypoxic on 5 liters of supplemental O2. He was complaining of lower back pain.     Past Medical History:   Diagnosis Date    Angina, class III     Atherosclerosis of lower extremity with claudication     Bilateral carotid artery stenosis     CAD (coronary artery disease)     CAD (coronary artery disease) of artery bypass graft     Chronic hepatitis C     COPD (chronic obstructive pulmonary disease)     Hepatitis     HHD (hypertensive heart  disease)     Hypercholesteremia     Hypoxia     Neuropathy, lower extremity     Obstructive sleep apnea     PAF (paroxysmal atrial fibrillation)     Presence of stent in artery     Syncope        Past Surgical History:   Procedure Laterality Date    COLONOSCOPY      CORONARY ARTERY BYPASS GRAFT      stent in artery      UPPER GASTROINTESTINAL ENDOSCOPY         Review of patient's allergies indicates:  No Known Allergies    Current Facility-Administered Medications on File Prior to Encounter   Medication    [DISCONTINUED] acetaminophen tablet 650 mg    [DISCONTINUED] albuterol-ipratropium 2.5mg-0.5mg/3mL nebulizer solution 3 mL    [DISCONTINUED] alprazolam tablet 1 mg    [DISCONTINUED] aspirin chewable tablet 81 mg    [DISCONTINUED] atorvastatin tablet 80 mg    [DISCONTINUED] budesonide nebulizer solution 0.5 mg    [DISCONTINUED] dextrose 50% injection 12.5 g    [DISCONTINUED] dextrose 50% injection 25 g    [DISCONTINUED] digoxin tablet 0.125 mg    [DISCONTINUED] enoxaparin injection 130 mg    [DISCONTINUED] furosemide injection 40 mg    [DISCONTINUED] gabapentin capsule 600 mg    [DISCONTINUED] glucagon (human recombinant) injection 1 mg    [DISCONTINUED] glucose chewable tablet 16 g    [DISCONTINUED] glucose chewable tablet 24 g    [DISCONTINUED] guaifenesin 12 hr tablet 1,200 mg    [DISCONTINUED] hydrocodone-acetaminophen 10-325mg per tablet 1 tablet    [DISCONTINUED] insulin aspart injection 1-10 Units    [DISCONTINUED] insulin detemir injection 15 Units    [DISCONTINUED] insulin detemir injection 17 Units    [DISCONTINUED] isosorbide mononitrate 24 hr tablet 30 mg    [DISCONTINUED] L.acidophil,parac-S.therm-Bif. (Risaquad) Cap 1 capsule    [DISCONTINUED] lidocaine 5 % patch 1 patch    [DISCONTINUED] magnesium oxide tablet 400 mg    [DISCONTINUED] methylPREDNISolone sodium succinate injection 40 mg    [DISCONTINUED] morphine injection 8 mg    [DISCONTINUED] pantoprazole EC  tablet 40 mg    [DISCONTINUED] polyethylene glycol packet 17 g    [DISCONTINUED] ranolazine 12 hr tablet 500 mg    [DISCONTINUED] spironolactone tablet 25 mg    [DISCONTINUED] tamsulosin 24 hr capsule 0.4 mg    [DISCONTINUED] tizanidine tablet 4 mg    [DISCONTINUED] warfarin (COUMADIN) tablet 5 mg     Current Outpatient Prescriptions on File Prior to Encounter   Medication Sig    ADVAIR DISKUS 250-50 mcg/dose diskus inhaler Inhale 1 puff into the lungs 2 (two) times daily.    albuterol (PROAIR HFA) 90 mcg/actuation inhaler Inhale 1 puff into the lungs 2 (two) times daily. Rescue    albuterol (PROVENTIL) 5 mg/mL nebulizer solution Take 2.5 mg by nebulization 3 (three) times daily. Rescue    alprazolam (XANAX) 1 MG tablet Take 1 mg by mouth 2 (two) times daily as needed for Anxiety.     amiodarone (PACERONE) 100 MG Tab Take 1 tablet (100 mg total) by mouth once daily.    aspirin 81 MG Chew Take 81 mg by mouth once daily.    atorvastatin (LIPITOR) 40 MG tablet Take 2 tablets (80 mg total) by mouth once daily.    carvedilol (COREG) 3.125 MG tablet Take 3.125 mg by mouth 2 (two) times daily.    cholecalciferol, vitamin D3, (VITAMIN D3) 400 unit Chew Take 400 Units by mouth once daily.    cilostazol (PLETAL) 100 MG Tab Take 100 mg by mouth 2 (two) times daily.     clopidogrel (PLAVIX) 75 mg tablet Take 75 mg by mouth once daily.     fish oil-omega-3 fatty acids 300-1,000 mg capsule Take 1 capsule by mouth 2 (two) times daily.     furosemide (LASIX) 40 MG tablet Take 1 tablet by mouth 2 (two) times daily.    gabapentin (NEURONTIN) 300 MG capsule Take 3 capsules (900 mg total) by mouth every evening.    hydrocodone-acetaminophen 10-325mg (NORCO)  mg Tab Take 1 tablet by mouth 2 (two) times daily as needed.     insulin detemir (LEVEMIR) 100 unit/mL injection Inject 15 Units into the skin 2 (two) times daily.    isosorbide mononitrate (IMDUR) 30 MG 24 hr tablet Take 1 tablet (30 mg total) by mouth  once daily.    nitroGLYCERIN (NITROSTAT) 0.4 MG SL tablet Place 0.4 mg under the tongue every 5 (five) minutes as needed for Chest pain.    pantoprazole (PROTONIX) 40 MG tablet 40 mg 2 (two) times daily.     ranolazine (RANEXA) 500 MG Tb12 Take 1 tablet (500 mg total) by mouth 2 (two) times daily.    SPIRIVA RESPIMAT 2.5 mcg/actuation Mist Inhale 2 puffs into the lungs once daily at 6am.    spironolactone (ALDACTONE) 25 MG tablet Take 1 tablet (25 mg total) by mouth once daily.    sucralfate (CARAFATE) 1 gram tablet Take 1 tablet by mouth 4 (four) times daily before meals and nightly.    tamsulosin (FLOMAX) 0.4 mg Cp24 Take 1 capsule (0.4 mg total) by mouth once daily.    tizanidine (ZANAFLEX) 4 MG tablet Take 6 mg by mouth every 8 (eight) hours. Take one and one half tablet    warfarin (COUMADIN) 5 MG tablet Take 1 tablet (5 mg total) by mouth Daily.     Family History     Problem Relation (Age of Onset)    Liver disease Father, Sister        Social History Main Topics    Smoking status: Former Smoker     Packs/day: 4.00     Years: 40.00     Types: Cigarettes     Start date: 1968     Quit date: 6/11/2009    Smokeless tobacco: Never Used    Alcohol use No    Drug use: No    Sexual activity: Not on file     Review of Systems   Constitutional: Negative for chills, diaphoresis and fever.   HENT: Negative for congestion and sinus pressure.    Eyes: Negative for photophobia and visual disturbance.   Respiratory: Positive for cough, shortness of breath and wheezing.    Cardiovascular: Positive for leg swelling. Negative for chest pain.   Gastrointestinal: Negative for abdominal distention, abdominal pain, diarrhea and nausea.   Endocrine: Negative for cold intolerance and heat intolerance.   Genitourinary: Negative for dysuria.   Musculoskeletal: Positive for arthralgias and back pain. Negative for myalgias.   Neurological: Negative for facial asymmetry and weakness.   Psychiatric/Behavioral: Negative for  confusion.     Objective:     Vital Signs (Most Recent):  Temp: 98.6 °F (37 °C) (07/27/17 2300)  Pulse: 99 (07/27/17 2300)  Resp: (!) 22 (07/27/17 2300)  BP: 110/60 (07/27/17 2300)  SpO2: (!) 86 % (07/27/17 2300) Vital Signs (24h Range):  Temp:  [97.4 °F (36.3 °C)-98.6 °F (37 °C)] 98.6 °F (37 °C)  Pulse:  [50-99] 99  Resp:  [16-22] 22  SpO2:  [81 %-89 %] 86 %  BP: ()/(52-70) 110/60     Weight: 129 kg (284 lb 6.3 oz)  Body mass index is 39.66 kg/m².    Physical Exam   Constitutional: He is oriented to person, place, and time. He appears well-developed and well-nourished. No distress.   HENT:   Head: Normocephalic and atraumatic.   Eyes: Conjunctivae and EOM are normal. Pupils are equal, round, and reactive to light. No scleral icterus.   Neck: Normal range of motion. Neck supple.   Cardiovascular: Normal heart sounds and intact distal pulses.  A regularly irregular rhythm present. Tachycardia present.    Pulmonary/Chest: Effort normal. He has wheezes. He has rales. He exhibits no tenderness.   Abdominal: Soft. Bowel sounds are normal. He exhibits no distension. There is no tenderness.   Musculoskeletal: He exhibits edema and tenderness.   Neurological: He is alert and oriented to person, place, and time.   Skin: Skin is warm and dry. Capillary refill takes less than 2 seconds. He is not diaphoretic.        Significant Labs:   CBC:   Recent Labs  Lab 07/26/17  0639 07/27/17  0504 07/28/17  0106   WBC 8.30 7.00 9.72   HGB 13.2* 13.9* 14.9   HCT 40.4 41.7 45.8    191 218     CMP:   Recent Labs  Lab 07/26/17  0639 07/27/17  0504 07/28/17  0106    137 141   K 3.6 4.9 4.1   CL 97 95 101   CO2 32* 32* 29   GLU 97 179* 123*   BUN 22* 25* 26*   CREATININE 1.00 1.10 1.2   CALCIUM 8.6 9.4 9.5   PROT 6.3 6.6 6.7   ALBUMIN 3.2* 3.5 3.0*   BILITOT 1.5* 1.6* 1.6*   ALKPHOS 65 77 86   AST 28 27 16   ALT 39 40 26   ANIONGAP  --   --  11   EGFRNONAA >60 >60 >60.0       Significant Imaging: I have reviewed all  pertinent imaging results/findings within the past 24 hours.    Assessment/Plan:     * Acute on chronic respiratory failure with hypoxia and hypercapnia    Hypoxic at baseline w/ chronic supplemental O2 requirement (4-5 liters) and Trilogy at night who's transferred for pulm-HTN eval. Continued guaifenesin, DuoNeb q6 and Budesonide q12. Currently on therapeutic Lovenox w/ warfarin for PE, however CTA on 7/24 was negative for any thrombi. Consulted pulmonology for further evaluation.         Paroxysmal atrial fibrillation    Started on warfarin. Continue digoxin. Patient on telemetry.           Type 2 diabetes mellitus with hyperglycemia    Detemir 17 units BID w/ SSI .         Demand ischemia    ASA, statin and Imdur.           Morbid obesity with alveolar hypoventilation    Trilogy QHS.           HATTIE on CPAP    Trilogy QHS.          CAD (coronary artery disease)    Troponemia thought to be 2/2 to demand ischemia. Currently on ranolazine, ASA and atorvastatin.         Chronic obstructive pulmonary disease with acute exacerbation    Treated w/ high dose steroids w/o improvement. Continued DuoNeb and budesonide.         Chronic hepatitis C    Patient seen by hepatology at [facility] [place] and in discussion regarding treatment.  Appears that his lung disease is the ultimately limiting factor for treatment (his concerns regarding current treatments and effects on pulmonary status, as well as high resistance to of hepC strain to currently available regimens).  Patient scheduled to f/u with Hepatology August 2017 with Dr. Lopez.           VTE Risk Mitigation         Ordered     warfarin (COUMADIN) tablet 5 mg  Daily     Route:  Oral        07/28/17 0027        Alistair Perez DO  Department of Hospital Medicine   [facility]

## 2017-07-28 NOTE — CONSULTS
Heart Failure and Transplant Service Consult Note  Attending Physician: Donnie Rueda MD  Reason for Consult: Pulmonary HTN evaluation     HPI:   62 y.o. gentleman with PMH of HFpEF (EF 50%), COPD on home O2 (4-5L NC), morbid obesity, obesity hypoventilation syndrome (OHS), CAD s/p CABG (2000), s/p PCI (05/2016) on ASA/Plavix, PAD s/p PTCA in 2000 on ASA/pletal, paroxysmal AF s/p DCCV (05/2016), hx of cardiac arrest s/p CPR (2016), Hepatitis C (not on treatment due to lung disease limiting his life expectancy more than hep. C) who presented to [facility] as a transfer from [facility] for management of acute on chronic respiratory failure / pulmonary HTN management.     He presented to [facility] on 7/13 via EMS for worsening hypoxia and altered mental status. Initially admitted to the ICU. Worsening hypoxia was thought to be 2/2 to pulmonary HTN. Bubble study was negative for shunt. Pt was also started on therapeutic Lovenox and was being bridged to warfarin for suspected PE, despite negative CTA. Finished abx treatment for RLL PNA. Diuretics were held 2/2 to hypotension. Pt had a bump in troponin which was deemed to be NSTEMI 2/2 to demand ischemia. His coreg dose was decreased to due underlying lung issues/ hypoxia. He was not treated with steroids due to issues with uncontrolled DM/ hyperglycemia. He was diuresed with lasix 40 PO TID. Transferred to [facility] for evaluation of acute on chronic respiratory failure 2/2 pulmonary HTN.     On arrival, pt was hypoxic on 5 liters of supplemental O2. He reports no symptoms at rest but reports NYHA Class III SOB, no orthopnea (uses 2 pillows for comfort), no PND. Reports weight gain and leg swelling. At home he takes lasix 40 mg PO BID. Reports compliance with medications but not a low salt diet. He claims he uses 2LNC at home but gradually increased to 4-5L NC after seeing his pulmonologist, Dr. Booker.     ROS:    Constitution: Negative for  fever, chills, weight loss or gain.   HENT: Negative for sore throat, rhinorrhea, or headache.  Eyes: Negative for blurred or double vision.   Cardiovascular: See above  Pulmonary: Positive for SOB   Gastrointestinal: Negative for abdominal pain, nausea, vomiting, or diarrhea.   : Negative for dysuria.   Neurological: Negative for focal weakness or sensory changes.  PMH:     Past Medical History:   Diagnosis Date    Angina, class III     Atherosclerosis of lower extremity with claudication     Bilateral carotid artery stenosis     CAD (coronary artery disease)     CAD (coronary artery disease) of artery bypass graft     Chronic hepatitis C     COPD (chronic obstructive pulmonary disease)     Hepatitis     HHD (hypertensive heart disease)     Hypercholesteremia     Hypoxia     Neuropathy, lower extremity     Obstructive sleep apnea     PAF (paroxysmal atrial fibrillation)     Presence of stent in artery     Syncope      Past Surgical History:   Procedure Laterality Date    COLONOSCOPY      CORONARY ARTERY BYPASS GRAFT      stent in artery      UPPER GASTROINTESTINAL ENDOSCOPY       Allergies:   Review of patient's allergies indicates:  No Known Allergies  Medications:     Current Facility-Administered Medications on File Prior to Encounter   Medication Dose Route Frequency Provider Last Rate Last Dose    [DISCONTINUED] acetaminophen tablet 650 mg  650 mg Oral Q6H PRN Vj Rodriguez MD   650 mg at 07/26/17 1025    [DISCONTINUED] albuterol-ipratropium 2.5mg-0.5mg/3mL nebulizer solution 3 mL  3 mL Nebulization Q6H Indra Booker DO   3 mL at 07/27/17 1828    [DISCONTINUED] alprazolam tablet 1 mg  1 mg Oral BID PRN Lurdes Bell NP   1 mg at 07/27/17 2022    [DISCONTINUED] aspirin chewable tablet 81 mg  81 mg Oral Daily Kenrick Reddy MD   81 mg at 07/27/17 0943    [DISCONTINUED] atorvastatin tablet 80 mg  80 mg Oral Daily Kenrick Reddy MD   80 mg at 07/27/17 0943     [DISCONTINUED] budesonide nebulizer solution 0.5 mg  0.5 mg Nebulization Q12H Indra Booker DO   0.5 mg at 07/27/17 1830    [DISCONTINUED] dextrose 50% injection 12.5 g  12.5 g Intravenous PRN Indra Booker DO        [DISCONTINUED] dextrose 50% injection 25 g  25 g Intravenous PRN Indra Booker DO        [DISCONTINUED] enoxaparin injection 130 mg  1 mg/kg Subcutaneous Q12H Indra Booker DO   130 mg at 07/27/17 1127    [DISCONTINUED] furosemide injection 40 mg  40 mg Intravenous BID Tan Robb MD   40 mg at 07/27/17 1736    [DISCONTINUED] gabapentin capsule 600 mg  600 mg Oral TID Vj Rodriguez MD   600 mg at 07/27/17 2009    [DISCONTINUED] glucagon (human recombinant) injection 1 mg  1 mg Intramuscular PRN Indra Booker DO        [DISCONTINUED] glucose chewable tablet 16 g  16 g Oral PRN Indra Booker DO        [DISCONTINUED] glucose chewable tablet 24 g  24 g Oral PRN Indra Booker DO        [DISCONTINUED] guaifenesin 12 hr tablet 1,200 mg  1,200 mg Oral BID Indra Booker DO   1,200 mg at 07/27/17 2009    [DISCONTINUED] hydrocodone-acetaminophen 10-325mg per tablet 1 tablet  1 tablet Oral Q6H PRN Kenrick Reddy MD   1 tablet at 07/27/17 2147    [DISCONTINUED] insulin aspart injection 1-10 Units  1-10 Units Subcutaneous QID (AC + HS) PRN Indra Booker DO   2 Units at 07/27/17 2057    [DISCONTINUED] insulin detemir injection 17 Units  17 Units Subcutaneous BID Tan Robb MD   17 Units at 07/27/17 2022    [DISCONTINUED] isosorbide mononitrate 24 hr tablet 30 mg  30 mg Oral Daily Vj Rodriguez MD   30 mg at 07/27/17 0944    [DISCONTINUED] L.acidophil,parac-S.therm-Bif. (Risaquad) Cap 1 capsule  1 capsule Oral Daily Indra Booker DO   1 capsule at 07/27/17 0944    [DISCONTINUED] lidocaine 5 % patch 1 patch  1 patch Transdermal Q24H Indra Booker DO   1 patch at 07/27/17 1412    [DISCONTINUED] magnesium oxide tablet 400 mg  400 mg Oral BID Archana Worley MD    400 mg at 07/27/17 2100    [DISCONTINUED] methylPREDNISolone sodium succinate injection 40 mg  40 mg Intravenous Q6H Indra Booker, DO   40 mg at 07/27/17 1736    [DISCONTINUED] morphine injection 8 mg  8 mg Intravenous Q6H PRN Vj Rodriguez MD   8 mg at 07/25/17 1727    [DISCONTINUED] pantoprazole EC tablet 40 mg  40 mg Oral Daily Kenrick Reddy MD   40 mg at 07/27/17 0944    [DISCONTINUED] polyethylene glycol packet 17 g  17 g Oral Daily Emma Greco NP   17 g at 07/26/17 0912    [DISCONTINUED] ranolazine 12 hr tablet 500 mg  500 mg Oral BID Kenrick Reddy MD   500 mg at 07/27/17 2009    [DISCONTINUED] spironolactone tablet 25 mg  25 mg Oral Daily Kenrick Reddy MD   25 mg at 07/25/17 0824    [DISCONTINUED] tamsulosin 24 hr capsule 0.4 mg  0.4 mg Oral Daily Kenrick Reddy MD   0.4 mg at 07/27/17 0945    [DISCONTINUED] tizanidine tablet 4 mg  4 mg Oral Nightly PRN Indra Booker DO   4 mg at 07/26/17 2039    [DISCONTINUED] warfarin (COUMADIN) tablet 5 mg  5 mg Oral Daily Indra Booker DO   5 mg at 07/27/17 1657     Current Outpatient Prescriptions on File Prior to Encounter   Medication Sig Dispense Refill    ADVAIR DISKUS 250-50 mcg/dose diskus inhaler Inhale 1 puff into the lungs 2 (two) times daily.      albuterol (PROAIR HFA) 90 mcg/actuation inhaler Inhale 1 puff into the lungs 2 (two) times daily. Rescue      albuterol (PROVENTIL) 5 mg/mL nebulizer solution Take 2.5 mg by nebulization 3 (three) times daily. Rescue      alprazolam (XANAX) 1 MG tablet Take 1 mg by mouth 2 (two) times daily as needed for Anxiety.       amiodarone (PACERONE) 100 MG Tab Take 1 tablet (100 mg total) by mouth once daily.      aspirin 81 MG Chew Take 81 mg by mouth once daily.      atorvastatin (LIPITOR) 40 MG tablet Take 2 tablets (80 mg total) by mouth once daily.      carvedilol (COREG) 3.125 MG tablet Take 3.125 mg by mouth 2 (two) times daily.      cholecalciferol, vitamin  D3, (VITAMIN D3) 400 unit Chew Take 400 Units by mouth once daily.      cilostazol (PLETAL) 100 MG Tab Take 100 mg by mouth 2 (two) times daily.       clopidogrel (PLAVIX) 75 mg tablet Take 75 mg by mouth once daily.       fish oil-omega-3 fatty acids 300-1,000 mg capsule Take 1 capsule by mouth 2 (two) times daily.       furosemide (LASIX) 40 MG tablet Take 1 tablet by mouth 2 (two) times daily.      gabapentin (NEURONTIN) 300 MG capsule Take 3 capsules (900 mg total) by mouth every evening.      hydrocodone-acetaminophen 10-325mg (NORCO)  mg Tab Take 1 tablet by mouth 2 (two) times daily as needed.       insulin detemir (LEVEMIR) 100 unit/mL injection Inject 15 Units into the skin 2 (two) times daily. 9 mL 11    isosorbide mononitrate (IMDUR) 30 MG 24 hr tablet Take 1 tablet (30 mg total) by mouth once daily. 30 tablet 11    nitroGLYCERIN (NITROSTAT) 0.4 MG SL tablet Place 0.4 mg under the tongue every 5 (five) minutes as needed for Chest pain.      pantoprazole (PROTONIX) 40 MG tablet 40 mg 2 (two) times daily.       ranolazine (RANEXA) 500 MG Tb12 Take 1 tablet (500 mg total) by mouth 2 (two) times daily.      SPIRIVA RESPIMAT 2.5 mcg/actuation Mist Inhale 2 puffs into the lungs once daily at 6am.      spironolactone (ALDACTONE) 25 MG tablet Take 1 tablet (25 mg total) by mouth once daily.      sucralfate (CARAFATE) 1 gram tablet Take 1 tablet by mouth 4 (four) times daily before meals and nightly.      tamsulosin (FLOMAX) 0.4 mg Cp24 Take 1 capsule (0.4 mg total) by mouth once daily.      tizanidine (ZANAFLEX) 4 MG tablet Take 6 mg by mouth every 8 (eight) hours. Take one and one half tablet      warfarin (COUMADIN) 5 MG tablet Take 1 tablet (5 mg total) by mouth Daily. 30 tablet 11       Inpatient Medications   Continuous Infusions:   Scheduled Meds:   albuterol-ipratropium 2.5mg-0.5mg/3mL  3 mL Nebulization Q6H WAKE    aspirin  81 mg Oral Daily    atorvastatin  80 mg Oral Daily     "budesonide  0.5 mg Nebulization Q12H    digoxin  0.125 mg Oral Daily    enoxaparin  40 mg Subcutaneous Q12H    furosemide  40 mg Intravenous BID    gabapentin  600 mg Oral TID    guaifenesin  1,200 mg Oral BID    [START ON 7/29/2017] insulin detemir  17 Units Subcutaneous BID    isosorbide mononitrate  30 mg Oral Daily    lidocaine  1 patch Transdermal Q24H    magnesium oxide  400 mg Oral BID    pantoprazole  40 mg Oral Daily    polyethylene glycol  17 g Oral Daily    ranolazine  500 mg Oral BID    tamsulosin  0.4 mg Oral Daily     PRN Meds:acetaminophen, alprazolam, dextrose 50%, dextrose 50%, glucagon (human recombinant), glucose, glucose, hydrocodone-acetaminophen 10-325mg, insulin aspart, nitroGLYCERIN, tizanidine     Social History:     Social History   Substance Use Topics    Smoking status: Former Smoker     Packs/day: 4.00     Years: 40.00     Types: Cigarettes     Start date: 1968     Quit date: 6/11/2009    Smokeless tobacco: Never Used    Alcohol use No     Family History:     Family History   Problem Relation Age of Onset    Liver disease Father     Liver disease Sister      Physical Exam:     Vitals:  Temp:  [97.5 °F (36.4 °C)-98.6 °F (37 °C)]   Pulse:  [62-99]   Resp:  [18-23]   BP: (105-135)/(60-75)   SpO2:  [86 %-93 %]     /73 (BP Location: Left arm, Patient Position: Lying, BP Method: Automatic)   Pulse 62   Temp 97.7 °F (36.5 °C) (Oral)   Resp 20   Ht 5' 11" (1.803 m)   Wt 132.4 kg (291 lb 14.2 oz)   SpO2 (!) 88%   BMI 40.71 kg/m²   I/O's:    Intake/Output Summary (Last 24 hours) at 07/28/17 1821  Last data filed at 07/28/17 1400   Gross per 24 hour   Intake               50 ml   Output             1190 ml   Net            -1140 ml       Wt Readings from Last 10 Encounters:   07/28/17 132.4 kg (291 lb 14.2 oz)   07/27/17 129.9 kg (286 lb 4.8 oz)   06/13/17 129.3 kg (285 lb)   05/10/17 134.2 kg (295 lb 13.7 oz)   04/13/17 135.2 kg (298 lb)   05/13/15 133.4 kg (294 lb) "   05/13/15 133.5 kg (294 lb 4.8 oz)   02/11/15 134.7 kg (297 lb)   02/11/15 134.7 kg (297 lb)        Constitutional: NAD, conversant  HEENT: Sclera anicteric, PERRLA, EOMI  Neck: Unable to visualize JVD, no carotid bruits  CV: RRR, no murmur, normal S1/S2, +S3, +S4, No Pericardial rub  Pulm: coarse breath sounds, decreased air entry  GI: Abdomen soft, NTND, +BS  Extremities: 2+ LE edema, warm and well perfused, No cyanosis, No clubbing  Skin: No ecchymosis, erythema, or ulcers  Psych: AOx3, appropriate affect  Neuro: CNII-XII intact, no focal deficits      Labs:       Recent Labs  Lab 07/27/17  0504 07/28/17 0106 07/28/17 0527     --  141 142   K 4.9  --  4.1 5.1   CL 95  --  101 101   CO2 32*  --  29 33*   BUN 25*  --  26* 27*   CREATININE 1.10  --  1.2 1.1   ANIONGAP  --   < > 11 8   < > = values in this interval not displayed.    Recent Labs  Lab 07/27/17  0504 07/28/17 0106 07/28/17 0527   AST 27 16 18   ALT 40 26 26   ALKPHOS 77 86 75   BILITOT 1.6* 1.6* 1.4*   ALBUMIN 3.5 3.0* 2.9*     No results for input(s): TROPONINI, BNP in the last 168 hours.  No results for input(s): PH, PCO2, PO2, HCO3, POCSATURATED in the last 168 hours.   Recent Labs  Lab 07/27/17  0504 07/28/17 0106 07/28/17 0527   WBC 7.00 9.72 10.08   HGB 13.9* 14.9 14.5   HCT 41.7 45.8 45.0    218 200   GRAN 84.8*  6.0 81.6*  7.9* 77.8*  7.8*       Recent Labs  Lab 07/28/17 0527   INR 1.3*     No results found for: CHOL, HDL, LDLCALC, TRIG  Lab Results   Component Value Date    HGBA1C 13.1 (H) 07/14/2017    TSH 0.814 02/11/2015        Micro:  Blood Cultures  No results found for: LABBLOO  Urine Cultures  Lab Results   Component Value Date    LABURIN No significant growth 07/13/2017       Imaging:   CXR (7/23/17): Prior cardiac surgery and stable mild cardiomegaly.  Atherosclerotic changes of the aorta.      Chronic lung changes and mild vascular congestion.  No focal infiltrate or effusion.      Degenerative changes of the  spine.    CTA Chest (7/24/17):  1.  No CT evidence of central pulmonary embolus.  Evaluation is technically limited due to patient motion artifact.  2.  Cardiomegaly and vascular congestion.  Minimal basilar compressive atelectasis and trace effusions.  Stable subcentimeter pulmonary nodules.  3.  Stable moderately prominent mediastinal lymph nodes similar to the prior study of 05/08/2017.    TTE (7/25/17):  1. The study quality is below average.   2. Global left ventricular systolic function is borderline. The left   ventricular ejection fraction is 50%.    3. Left ventricular diastolic function is indeterminate.   4. Right ventricular systolic function is mildly decreased. Right   ventricular diastolic dimension is 4.6 cms.    5. Mild calcification of the aortic valve is noted with adequate   cuspal excursion.   6. The left atrium is moderately enlarged. Left atrial diameter is 4.7   cms. The right atrium is mildly enlarged. Right atrial diameter is 4.8   cms.    7. Trace pulmonic regurgitation. Mild (1+) mitral regurgitation.   Moderate (2+) tricuspid regurgitation.   8. The pulmonary artery systolic pressure is 60 mmHg. Evidence of   pulmonary hypertension is noted, but below average study.  Septum in   some views appears to be paradoxical that would support pulmonary   hypertension     Intra-modality comparison (Echocardiogram)  This echocardiogram when compared with the latest echocardiogram-TTE   ([facility]) dated 8/9/2016 shows:    1. Ejection fraction essentially remained unchanged (45% previous   study, 50% current study).    2. Left atrium size changed from severely increased to moderately   increased and right atrium size changed from normal to mildly   increased is noted in the current study.    EF   Date Value Ref Range Status   03/13/2015 65 55 - 65        EKG: Sinus hector, rate 59 BPM, RVH, poor R wave progression suggesting underlying lung pathology      Assessment:   62 y.o. gentleman with PMH of  HFpEF (EF 50%), COPD on home O2 (4-5L NC), morbid obesity, obesity hypoventilation syndrome (OHS), CAD s/p CABG (2000), s/p PCI (05/2016) on ASA/Plavix, PAD s/p PTCA in 2000 on ASA/pletal, paroxysmal AF s/p DCCV (05/2016), hx of cardiac arrest s/p CPR (2016), Hepatitis C (not on treatment due to lung disease limiting his life expectancy more than hep. C) who presented to [facility] as a transfer from [facility] for management of acute on chronic respiratory failure / pulmonary HTN management.     Plan:   Pulmonary HTN - multifactorial - WHO II/III (ADHF/COPD)  - recommend IV diureses with lasix 80 mg IV BID  - management of COPD/HATTIE/OHS as per pulm recs  - daily STANDING weights, strict I/Os, 2 gm Na diet, 1500 mL fluid restriction  - TTE w/ CFD on Monday after being aggressively diuresed  - monitor renal function daily, keep K>4.0, Mg>2.0  - CTA chest negative for PE  - known hx of COPD/HATTIE based on prior spirometry/PSG  - check BNP, CXR  - weight 285 (6/2017) - now 291 lbs    CAD s/p CABG (2000), s/p PCI (05/2016)  - c/w ASA and plavix    PAD   - c/w ASA, pletal    Paroxysmal AF  - [id number] - 4  - c/w amiodarone  - INR subtherapeutic - will need therapeutic AC - given use of ASA, Plavix, pletal, and Warfarin - would defer holding the plavix to the patient's primary cardiologist given >1 year since PCI    Continue to diurese over the weekend. We will follow up on Monday after TTE performed. Discussed with Dr. Bhatt.    Signed:  Stephon Lopez MD  Cardiology Fellow  Pager: [phone]  7/28/2017 6:21 PM

## 2017-07-28 NOTE — HPI
Shaan Brown is a 62-year-old male w/ chronic mixed hypercapnia and hypoxemia 2/2 COPD, HFpEF, HATTIE and obesity hypoventilation who's on 4-5 liters of continuous supplemental O2 (SpO2 typically in the low 90s-80s)  w/ Trilogy at night. He presented to [facility] on 7/13 via EMS for worsening hypoxia and altered mental status. Initially admitted to the ICU. Worsening hypoxia was thought to be 2/2 to pulmonary HTN w/ PA pressures of 71. Bubble study was negative for shunt. Pt was also started on therapeutic Lovenox and was being bridged to warfarin for suspected PE, despite negative CTA. Finished abx treatment for RLL PNA. Diuretics were held 2/2 to hypotension. Pt has what looks to be a NSTEMI 2/2 to demand, however patient was not tolerate of beta-blocker therapy 2/2 to hypoxia. No improvement in hypoxia despite steroids. Transferred to [facility] for evaluation by pulmonology for pulmonary HTN.   On arrive, pt was hypoxic on 5 liters of supplemental O2. He was complaining of lower back pain.

## 2017-07-28 NOTE — ASSESSMENT & PLAN NOTE
Hypoxic at baseline w/ chronic supplemental O2 requirement (4-5 liters) and Trilogy at night who's transferred for pulm-HTN eval. Continued guaifenesin, DuoNeb q6 and Budesonide q12. Currently on therapeutic Lovenox w/ warfarin for PE, however CTA on 7/24 was negative for any thrombi. Consulted pulmonology for further evaluation.

## 2017-07-28 NOTE — PLAN OF CARE
Problem: Patient Care Overview  Goal: Plan of Care Review  Outcome: Ongoing (interventions implemented as appropriate)  POC reviewed with patient. AAO x4. VSS. Remained free from falls throughout shift. 5L/ NC. Pain controlled with PRN pain meds. Pt awaiting pulmonary consult

## 2017-07-28 NOTE — SUBJECTIVE & OBJECTIVE
Past Medical History:   Diagnosis Date    Angina, class III     Atherosclerosis of lower extremity with claudication     Bilateral carotid artery stenosis     CAD (coronary artery disease)     CAD (coronary artery disease) of artery bypass graft     Chronic hepatitis C     COPD (chronic obstructive pulmonary disease)     Hepatitis     HHD (hypertensive heart disease)     Hypercholesteremia     Hypoxia     Neuropathy, lower extremity     Obstructive sleep apnea     PAF (paroxysmal atrial fibrillation)     Presence of stent in artery     Syncope        Past Surgical History:   Procedure Laterality Date    COLONOSCOPY      CORONARY ARTERY BYPASS GRAFT      stent in artery      UPPER GASTROINTESTINAL ENDOSCOPY         Review of patient's allergies indicates:  No Known Allergies    Family History     Problem Relation (Age of Onset)    Liver disease Father, Sister        Social History Main Topics    Smoking status: Former Smoker     Packs/day: 4.00     Years: 40.00     Types: Cigarettes     Start date: 1968     Quit date: 6/11/2009    Smokeless tobacco: Never Used    Alcohol use No    Drug use: No    Sexual activity: Not on file         Review of Systems   Constitutional: Negative for chills and fever.   HENT: Negative for trouble swallowing.    Eyes: Negative for visual disturbance.   Respiratory: Positive for cough, shortness of breath and wheezing.    Cardiovascular: Positive for chest pain.   Gastrointestinal: Negative for abdominal pain.   Genitourinary: Negative for dysuria.   Musculoskeletal: Positive for back pain.   Allergic/Immunologic:        Syncopal episode x 2 in hospital per pt before transfer to [facility]   Neurological: Positive for numbness.     Objective:     Vital Signs (Most Recent):  Temp: 97.9 °F (36.6 °C) (07/28/17 1159)  Pulse: 71 (07/28/17 1303)  Resp: (!) 23 (07/28/17 1303)  BP: 110/73 (07/28/17 1159)  SpO2: (!) 89 % (07/28/17 1303) Vital Signs (24h Range):  Temp:  [97.5 °F  (36.4 °C)-98.6 °F (37 °C)] 97.9 °F (36.6 °C)  Pulse:  [62-99] 71  Resp:  [18-23] 23  SpO2:  [82 %-93 %] 89 %  BP: (105-135)/(58-75) 110/73     Weight: 132.4 kg (291 lb 14.2 oz)  Body mass index is 40.71 kg/m².      Intake/Output Summary (Last 24 hours) at 07/28/17 1406  Last data filed at 07/28/17 1400   Gross per 24 hour   Intake               50 ml   Output             1190 ml   Net            -1140 ml       Physical Exam   Constitutional: He is oriented to person, place, and time. No distress.   obese   HENT:   Head: Normocephalic.   Eyes: EOM are normal.   Cardiovascular: Normal rate and normal heart sounds.    Pulmonary/Chest: Effort normal. No respiratory distress. He has wheezes.   Expiratory wheezes Uls>LLs; diminished breath sounds   Abdominal: Soft. There is no tenderness. There is no guarding.   Musculoskeletal: He exhibits edema.   3+ edema   Neurological: He is alert and oriented to person, place, and time.   Skin: He is not diaphoretic.       Vents:  Oxygen Concentration (%): 40 (07/28/17 1303)    Lines/Drains/Airways     Peripheral Intravenous Line                 Peripheral IV - Single Lumen 07/28/17 0756 Right Antecubital less than 1 day                Significant Labs:    CBC/Anemia Profile:    Recent Labs  Lab 07/27/17  0504 07/28/17  0106 07/28/17  0527   WBC 7.00 9.72 10.08   HGB 13.9* 14.9 14.5   HCT 41.7 45.8 45.0    218 200   MCV 90 92 93   RDW 22.1* 22.4* 22.8*        Chemistries:    Recent Labs  Lab 07/27/17  0504 07/28/17  0106 07/28/17  0527    141 142   K 4.9 4.1 5.1   CL 95 101 101   CO2 32* 29 33*   BUN 25* 26* 27*   CREATININE 1.10 1.2 1.1   CALCIUM 9.4 9.5 9.4   ALBUMIN 3.5 3.0* 2.9*   PROT 6.6 6.7 6.5   BILITOT 1.6* 1.6* 1.4*   ALKPHOS 77 86 75   ALT 40 26 26   AST 27 16 18   MG 2.6 2.3  --        ABGs: No results for input(s): PH, PCO2, HCO3, POCSATURATED, BE in the last 48 hours.  CMP:   Recent Labs  Lab 07/27/17  0504 07/28/17  0106 07/28/17  0527    141 142    K 4.9 4.1 5.1   CL 95 101 101   CO2 32* 29 33*   * 123* 110   BUN 25* 26* 27*   CREATININE 1.10 1.2 1.1   CALCIUM 9.4 9.5 9.4   PROT 6.6 6.7 6.5   ALBUMIN 3.5 3.0* 2.9*   BILITOT 1.6* 1.6* 1.4*   ALKPHOS 77 86 75   AST 27 16 18   ALT 40 26 26   ANIONGAP  --  11 8   EGFRNONAA >60 >60.0 >60.0     Troponin: No results for input(s): TROPONINI in the last 48 hours.    Significant Imaging:   I have reviewed all pertinent imaging results/findings within the past 24 hours.

## 2017-07-28 NOTE — HPI
61 yo male with chronic diastolic heart failure, COPD and chronic hypoxemic, hypercapnic respiratory failure, morbid obesity with obesity hypoventilation syndrome, CAD s/p CABG, PAF, CVD, dyslipidemia, HTN, HATTIE with home CPAP and hepatitis C. He was admitted from ER to CCU on 7/13 for hypoxemia (O2 sats 70s). [place] [facility] [place] for worsening hypoxemia in setting of HATTIE, OHS, COPD, and worsening pulmonary HTN. He is maintained on home O2 via nasal cannula at 4-5 liters continuously and Trilogy machine at night. He chronically has saturations in 80s-90s, however can fall into the 70s with exertion. He also reports chest pain with exertion. He states prior to admission he fell on his O2 tank, went to ER was sent home, had vivid dream that night and woke up short of breath.  He was recently treated for bronchitis 1 month prior, having completed a steroid taper.  Since then he reports continued cough with yellow-green sputum production associated with some diaphoresis. Denies fevers at home. Was seen in pulmology clinic after episode, increased O2 to 4-5L around the clock from 2-3L and he was to continue use of Trilogy at night and increase to use for 2-4 hours during day to help maintain sat >85%. Patient also taking advair and spiriva at home. Patient is prescrived 40mg lasix BID, reports compliance.

## 2017-07-28 NOTE — ASSESSMENT & PLAN NOTE
63 yo male with chronic diastolic heart failure, COPD and chronic hypoxemic, hypercapnic respiratory failure, morbid obesity with obesity hypoventilation syndrome, CAD s/p CABG, PAF, CVD, dyslipidemia, HTN, HATTIE with home CPAP and hepatitis C. He was admitted from ER to CCU on 7/13 for hypoxemia (O2 sats 70s) at [facility] admitted to [facility] 7/27 for worsening hypoxemia in setting of HATTIE, OHS, COPD, and worsening pulmonary HTN.    -Multiple medical co-morbidities contributing to hypercapnia and hypoxemia  -Pulmonary HTN has worsened from 39 in may to 71 to date; pt also with diastolic dysfunction  -Continue Diuresis  -Recommend check BNP  -Recommend consult to HF/PTN team  -Frieda Q4  -Patient has no evidence of PE on CT or DVT on US of extremities; would recommend only ppx anti-coagulation

## 2017-07-28 NOTE — PLAN OF CARE
07/28/17 1308   Discharge Assessment   Assessment Type Discharge Planning Assessment   Confirmed/corrected address and phone number on facesheet? Yes   Assessment information obtained from? Patient   Prior to hospitilization cognitive status: Alert/Oriented   Prior to hospitalization functional status: Independent;Assistive Equipment;Needs Assistance   Current cognitive status: Alert/Oriented   Current Functional Status: Independent;Assistive Equipment;Needs Assistance   Arrived From (Tx from [facility]- admitted there on 7/21)   Lives With other (see comments);spouse   Able to Return to Prior Arrangements yes   Is patient able to care for self after discharge? Unable to determine at this time (comments)   How many people do you have in your home that can help with your care after discharge? 1   Who are your caregiver(s) and their phone number(s)? (Rita (Wife) [phone])   Patient's perception of discharge disposition home health   Readmission Within The Last 30 Days no previous admission in last 30 days  (Pt was admitted to [facility] on 7/13- he was a transfer not a re-admit)   Patient currently being followed by outpatient case management? No   Patient currently receives home health services? Yes   Patient previously received home health services and would like to resume services if necessary? Yes   If yes, name of home health provider: ([facility])   Does the patient currently use HME? Yes   Patient currently receives private duty nursing? No   Patient currently receives any other outside agency services? No   Equipment Currently Used at Home oxygen;raised toilet;cane, straight;walker, standard  (built in shower bench, Trilogy- CPAP mode)   Do you have any problems affording any of your prescribed medications? No   Is the patient taking medications as prescribed? yes   Do you have any financial concerns preventing you from receiving the healthcare you need? No   Does the patient have  transportation to healthcare appointments? Yes   Transportation Available car;family or friend will provide   On Dialysis? No   Does the patient receive services at the [facility]madin Clinic? No   Are there any open cases? No   Discharge Plan A Home;Home with family;Home Health   Discharge Plan B Home;Home with family;Home Health   Patient/Family In Agreement With Plan yes

## 2017-07-28 NOTE — CONSULTS
[facility]  Pulmonology  Consult Note    Patient Name: Shaan Brown  MRN: [id number]  Admission Date: 7/27/2017  Hospital Length of Stay: 1 days  Code Status: Full Code  Attending Physician: Donnie Rueda MD  Primary Care Provider: Mendel Ferguson Iii, MD   Principal Problem: Acute on chronic respiratory failure with hypoxia and hypercapnia    Inpatient consult to Pulmonology  Consult performed by: KEELY ANNE  Consult ordered by: HONG ADAMS  Reason for consult: worsening hypoxemia in setting of HATTIE, OHS, COPD, PTHN        Subjective:     HPI:  63 yo male with chronic diastolic heart failure, COPD and chronic hypoxemic, hypercapnic respiratory failure, morbid obesity with obesity hypoventilation syndrome, CAD s/p CABG, PAF, CVD, dyslipidemia, HTN, HATTIE with home CPAP and hepatitis C. He was admitted from ER to CCU on 7/13 for hypoxemia (O2 sats 70s). Transferred to [facility] 7/27 for worsening hypoxemia in setting of HATTIE, OHS, COPD, and worsening pulmonary HTN. He is maintained on home O2 via nasal cannula at 4-5 liters continuously and Trilogy machine at night. He chronically has saturations in 80s-90s, however can fall into the 70s with exertion. He also reports chest pain with exertion. He states prior to admission he fell on his O2 tank, went to ER was sent home, had vivid dream that night and woke up short of breath.  He was recently treated for bronchitis 1 month prior, having completed a steroid taper.  Since then he reports continued cough with yellow-green sputum production associated with some diaphoresis. Denies fevers at home. Was seen in pulmology clinic after episode, increased O2 to 4-5L around the clock from 2-3L and he was to continue use of Trilogy at night and increase to use for 2-4 hours during day to help maintain sat >85%. Patient also taking advair and spiriva at home. Patient is prescrived 40mg lasix BID, reports compliance.    Past Medical History:   Diagnosis  Date    Angina, class III     Atherosclerosis of lower extremity with claudication     Bilateral carotid artery stenosis     CAD (coronary artery disease)     CAD (coronary artery disease) of artery bypass graft     Chronic hepatitis C     COPD (chronic obstructive pulmonary disease)     Hepatitis     HHD (hypertensive heart disease)     Hypercholesteremia     Hypoxia     Neuropathy, lower extremity     Obstructive sleep apnea     PAF (paroxysmal atrial fibrillation)     Presence of stent in artery     Syncope        Past Surgical History:   Procedure Laterality Date    COLONOSCOPY      CORONARY ARTERY BYPASS GRAFT      stent in artery      UPPER GASTROINTESTINAL ENDOSCOPY         Review of patient's allergies indicates:  No Known Allergies    Family History     Problem Relation (Age of Onset)    Liver disease Father, Sister        Social History Main Topics    Smoking status: Former Smoker     Packs/day: 4.00     Years: 40.00     Types: Cigarettes     Start date: 1968     Quit date: 6/11/2009    Smokeless tobacco: Never Used    Alcohol use No    Drug use: No    Sexual activity: Not on file         Review of Systems   Constitutional: Negative for chills and fever.   HENT: Negative for trouble swallowing.    Eyes: Negative for visual disturbance.   Respiratory: Positive for cough, shortness of breath and wheezing.    Cardiovascular: Positive for chest pain.   Gastrointestinal: Negative for abdominal pain.   Genitourinary: Negative for dysuria.   Musculoskeletal: Positive for back pain.   Allergic/Immunologic:        Syncopal episode x 2 in hospital per pt before [place] [facility]   Neurological: Positive for numbness.     Objective:     Vital Signs (Most Recent):  Temp: 97.9 °F (36.6 °C) (07/28/17 1159)  Pulse: 71 (07/28/17 1303)  Resp: (!) 23 (07/28/17 1303)  BP: 110/73 (07/28/17 1159)  SpO2: (!) 89 % (07/28/17 1303) Vital Signs (24h Range):  Temp:  [97.5 °F (36.4 °C)-98.6 °F (37 °C)] 97.9 °F  (36.6 °C)  Pulse:  [62-99] 71  Resp:  [18-23] 23  SpO2:  [82 %-93 %] 89 %  BP: (105-135)/(58-75) 110/73     Weight: 132.4 kg (291 lb 14.2 oz)  Body mass index is 40.71 kg/m².      Intake/Output Summary (Last 24 hours) at 07/28/17 1406  Last data filed at 07/28/17 1400   Gross per 24 hour   Intake               50 ml   Output             1190 ml   Net            -1140 ml       Physical Exam   Constitutional: He is oriented to person, place, and time. No distress.   obese   HENT:   Head: Normocephalic.   Eyes: EOM are normal.   Cardiovascular: Normal rate and normal heart sounds.    Pulmonary/Chest: Effort normal. No respiratory distress. He has wheezes.   Expiratory wheezes Uls>LLs; diminished breath sounds   Abdominal: Soft. There is no tenderness. There is no guarding.   Musculoskeletal: He exhibits edema.   3+ edema   Neurological: He is alert and oriented to person, place, and time.   Skin: He is not diaphoretic.       Vents:  Oxygen Concentration (%): 40 (07/28/17 1303)    Lines/Drains/Airways     Peripheral Intravenous Line                 Peripheral IV - Single Lumen 07/28/17 0756 Right Antecubital less than 1 day                Significant Labs:    CBC/Anemia Profile:    Recent Labs  Lab 07/27/17  0504 07/28/17  0106 07/28/17  0527   WBC 7.00 9.72 10.08   HGB 13.9* 14.9 14.5   HCT 41.7 45.8 45.0    218 200   MCV 90 92 93   RDW 22.1* 22.4* 22.8*        Chemistries:    Recent Labs  Lab 07/27/17  0504 07/28/17  0106 07/28/17  0527    141 142   K 4.9 4.1 5.1   CL 95 101 101   CO2 32* 29 33*   BUN 25* 26* 27*   CREATININE 1.10 1.2 1.1   CALCIUM 9.4 9.5 9.4   ALBUMIN 3.5 3.0* 2.9*   PROT 6.6 6.7 6.5   BILITOT 1.6* 1.6* 1.4*   ALKPHOS 77 86 75   ALT 40 26 26   AST 27 16 18   MG 2.6 2.3  --        ABGs: No results for input(s): PH, PCO2, HCO3, POCSATURATED, BE in the last 48 hours.  CMP:   Recent Labs  Lab 07/27/17  0504 07/28/17  0106 07/28/17  0527    141 142   K 4.9 4.1 5.1   CL 95 101 101   CO2  32* 29 33*   * 123* 110   BUN 25* 26* 27*   CREATININE 1.10 1.2 1.1   CALCIUM 9.4 9.5 9.4   PROT 6.6 6.7 6.5   ALBUMIN 3.5 3.0* 2.9*   BILITOT 1.6* 1.6* 1.4*   ALKPHOS 77 86 75   AST 27 16 18   ALT 40 26 26   ANIONGAP  --  11 8   EGFRNONAA >60 >60.0 >60.0     Troponin: No results for input(s): TROPONINI in the last 48 hours.    Significant Imaging:   I have reviewed all pertinent imaging results/findings within the past 24 hours.    Assessment/Plan:     * Acute on chronic respiratory failure with hypoxia and hypercapnia    61 yo male with chronic diastolic heart failure, COPD and chronic hypoxemic, hypercapnic respiratory failure, morbid obesity with obesity hypoventilation syndrome, CAD s/p CABG, PAF, CVD, dyslipidemia, HTN, HATTIE with home CPAP and hepatitis C. He was admitted from ER to CCU on 7/13 for hypoxemia (O2 sats 70s) at [facility] for worsening hypoxemia in setting of HATTIE, OHS, COPD, and worsening pulmonary HTN.    -Multiple medical co-morbidities contributing to hypercapnia and hypoxemia  -Pulmonary HTN has worsened from 39 in may to 71 to date; pt also with diastolic dysfunction  -Continue Diuresis  -Recommend check BNP  -Recommend consult to HF/PTN team  -Frieda Q4  -Patient has no evidence of PE on CT or DVT on US of extremities; would recommend only ppx anti-coagulation              Thank you for your consult.      Micheline Yoon MD  Pulmonology  [facility]

## 2017-07-29 PROBLEM — I73.9 PERIPHERAL VASCULAR DISEASE: Status: ACTIVE | Noted: 2017-07-29

## 2017-07-29 PROBLEM — S22.49XA RIB FRACTURES: Status: ACTIVE | Noted: 2017-07-29

## 2017-07-29 LAB
ALBUMIN SERPL BCP-MCNC: 3 G/DL
ALP SERPL-CCNC: 92 U/L
ALT SERPL W/O P-5'-P-CCNC: 29 U/L
ANION GAP SERPL CALC-SCNC: 14 MMOL/L
AST SERPL-CCNC: 24 U/L
BASOPHILS # BLD AUTO: 0 K/UL
BASOPHILS NFR BLD: 0 %
BILIRUB SERPL-MCNC: 1.6 MG/DL
BUN SERPL-MCNC: 29 MG/DL
CALCIUM SERPL-MCNC: 9 MG/DL
CHLORIDE SERPL-SCNC: 101 MMOL/L
CO2 SERPL-SCNC: 28 MMOL/L
CREAT SERPL-MCNC: 1.2 MG/DL
DIFFERENTIAL METHOD: ABNORMAL
EOSINOPHIL # BLD AUTO: 0.1 K/UL
EOSINOPHIL NFR BLD: 1.2 %
ERYTHROCYTE [DISTWIDTH] IN BLOOD BY AUTOMATED COUNT: 23 %
EST. GFR  (AFRICAN AMERICAN): >60 ML/MIN/1.73 M^2
EST. GFR  (NON AFRICAN AMERICAN): >60 ML/MIN/1.73 M^2
GLUCOSE SERPL-MCNC: 88 MG/DL
HCT VFR BLD AUTO: 46.1 %
HGB BLD-MCNC: 15 G/DL
INR PPP: 1.4
LYMPHOCYTES # BLD AUTO: 2.3 K/UL
LYMPHOCYTES NFR BLD: 30.1 %
MCH RBC QN AUTO: 29.9 PG
MCHC RBC AUTO-ENTMCNC: 32.5 G/DL
MCV RBC AUTO: 92 FL
MONOCYTES # BLD AUTO: 0.7 K/UL
MONOCYTES NFR BLD: 9.3 %
NEUTROPHILS # BLD AUTO: 4.6 K/UL
NEUTROPHILS NFR BLD: 59 %
PLATELET # BLD AUTO: 170 K/UL
PMV BLD AUTO: 9.6 FL
POCT GLUCOSE: 103 MG/DL (ref 70–110)
POCT GLUCOSE: 123 MG/DL (ref 70–110)
POCT GLUCOSE: 151 MG/DL (ref 70–110)
POCT GLUCOSE: 81 MG/DL (ref 70–110)
POTASSIUM SERPL-SCNC: 3.8 MMOL/L
PROT SERPL-MCNC: 6.2 G/DL
PROTHROMBIN TIME: 14 SEC
RBC # BLD AUTO: 5.01 M/UL
SODIUM SERPL-SCNC: 143 MMOL/L
WBC # BLD AUTO: 7.77 K/UL

## 2017-07-29 PROCEDURE — 85025 COMPLETE CBC W/AUTO DIFF WBC: CPT

## 2017-07-29 PROCEDURE — 11000001 HC ACUTE MED/SURG PRIVATE ROOM

## 2017-07-29 PROCEDURE — 36415 COLL VENOUS BLD VENIPUNCTURE: CPT

## 2017-07-29 PROCEDURE — 63600175 PHARM REV CODE 636 W HCPCS: Performed by: INTERNAL MEDICINE

## 2017-07-29 PROCEDURE — 63600175 PHARM REV CODE 636 W HCPCS: Performed by: STUDENT IN AN ORGANIZED HEALTH CARE EDUCATION/TRAINING PROGRAM

## 2017-07-29 PROCEDURE — 85610 PROTHROMBIN TIME: CPT

## 2017-07-29 PROCEDURE — 80053 COMPREHEN METABOLIC PANEL: CPT

## 2017-07-29 PROCEDURE — 25000242 PHARM REV CODE 250 ALT 637 W/ HCPCS: Performed by: INTERNAL MEDICINE

## 2017-07-29 PROCEDURE — 25000003 PHARM REV CODE 250: Performed by: STUDENT IN AN ORGANIZED HEALTH CARE EDUCATION/TRAINING PROGRAM

## 2017-07-29 PROCEDURE — 27000221 HC OXYGEN, UP TO 24 HOURS

## 2017-07-29 PROCEDURE — 94640 AIRWAY INHALATION TREATMENT: CPT

## 2017-07-29 PROCEDURE — 25000003 PHARM REV CODE 250: Performed by: INTERNAL MEDICINE

## 2017-07-29 PROCEDURE — 94761 N-INVAS EAR/PLS OXIMETRY MLT: CPT

## 2017-07-29 PROCEDURE — 99232 SBSQ HOSP IP/OBS MODERATE 35: CPT | Mod: GC,,, | Performed by: HOSPITALIST

## 2017-07-29 RX ORDER — OXYCODONE AND ACETAMINOPHEN 10; 325 MG/1; MG/1
1 TABLET ORAL EVERY 4 HOURS PRN
Status: DISCONTINUED | OUTPATIENT
Start: 2017-07-29 | End: 2017-08-05

## 2017-07-29 RX ORDER — OXYCODONE AND ACETAMINOPHEN 10; 325 MG/1; MG/1
1 TABLET ORAL EVERY 4 HOURS PRN
Status: DISCONTINUED | OUTPATIENT
Start: 2017-07-29 | End: 2017-07-29

## 2017-07-29 RX ORDER — HYDROMORPHONE HYDROCHLORIDE 1 MG/ML
0.5 INJECTION, SOLUTION INTRAMUSCULAR; INTRAVENOUS; SUBCUTANEOUS EVERY 4 HOURS PRN
Status: DISCONTINUED | OUTPATIENT
Start: 2017-07-29 | End: 2017-08-02

## 2017-07-29 RX ORDER — FUROSEMIDE 10 MG/ML
80 INJECTION INTRAMUSCULAR; INTRAVENOUS 2 TIMES DAILY
Status: DISCONTINUED | OUTPATIENT
Start: 2017-07-29 | End: 2017-07-31

## 2017-07-29 RX ORDER — LIDOCAINE 50 MG/G
1 PATCH TOPICAL
Status: DISCONTINUED | OUTPATIENT
Start: 2017-07-29 | End: 2017-08-09 | Stop reason: HOSPADM

## 2017-07-29 RX ORDER — WARFARIN SODIUM 5 MG/1
5 TABLET ORAL DAILY
Status: DISCONTINUED | OUTPATIENT
Start: 2017-07-29 | End: 2017-08-01

## 2017-07-29 RX ORDER — POTASSIUM CHLORIDE 750 MG/1
10 TABLET, EXTENDED RELEASE ORAL DAILY
COMMUNITY
End: 2019-08-09

## 2017-07-29 RX ADMIN — INSULIN DETEMIR 17 UNITS: 100 INJECTION, SOLUTION SUBCUTANEOUS at 09:07

## 2017-07-29 RX ADMIN — GABAPENTIN 600 MG: 300 CAPSULE ORAL at 01:07

## 2017-07-29 RX ADMIN — FUROSEMIDE 80 MG: 10 INJECTION, SOLUTION INTRAVENOUS at 08:07

## 2017-07-29 RX ADMIN — IPRATROPIUM BROMIDE AND ALBUTEROL SULFATE 3 ML: .5; 3 SOLUTION RESPIRATORY (INHALATION) at 08:07

## 2017-07-29 RX ADMIN — HYDROMORPHONE HYDROCHLORIDE 0.5 MG: 1 INJECTION, SOLUTION INTRAMUSCULAR; INTRAVENOUS; SUBCUTANEOUS at 03:07

## 2017-07-29 RX ADMIN — IPRATROPIUM BROMIDE AND ALBUTEROL SULFATE 3 ML: .5; 3 SOLUTION RESPIRATORY (INHALATION) at 07:07

## 2017-07-29 RX ADMIN — GUAIFENESIN 1200 MG: 600 TABLET, EXTENDED RELEASE ORAL at 08:07

## 2017-07-29 RX ADMIN — ISOSORBIDE MONONITRATE 30 MG: 30 TABLET ORAL at 08:07

## 2017-07-29 RX ADMIN — TAMSULOSIN HYDROCHLORIDE 0.4 MG: 0.4 CAPSULE ORAL at 08:07

## 2017-07-29 RX ADMIN — ASPIRIN 81 MG: 81 TABLET, COATED ORAL at 08:07

## 2017-07-29 RX ADMIN — OXYCODONE HYDROCHLORIDE AND ACETAMINOPHEN 1 TABLET: 10; 325 TABLET ORAL at 08:07

## 2017-07-29 RX ADMIN — INSULIN DETEMIR 17 UNITS: 100 INJECTION, SOLUTION SUBCUTANEOUS at 08:07

## 2017-07-29 RX ADMIN — ENOXAPARIN SODIUM 40 MG: 100 INJECTION SUBCUTANEOUS at 09:07

## 2017-07-29 RX ADMIN — RANOLAZINE 500 MG: 500 TABLET, FILM COATED, EXTENDED RELEASE ORAL at 09:07

## 2017-07-29 RX ADMIN — FUROSEMIDE 80 MG: 10 INJECTION, SOLUTION INTRAVENOUS at 05:07

## 2017-07-29 RX ADMIN — HYDROMORPHONE HYDROCHLORIDE 0.5 MG: 1 INJECTION, SOLUTION INTRAMUSCULAR; INTRAVENOUS; SUBCUTANEOUS at 09:07

## 2017-07-29 RX ADMIN — MAGNESIUM OXIDE TAB 400 MG (241.3 MG ELEMENTAL MG) 400 MG: 400 (241.3 MG) TAB at 09:07

## 2017-07-29 RX ADMIN — IPRATROPIUM BROMIDE AND ALBUTEROL SULFATE 3 ML: .5; 3 SOLUTION RESPIRATORY (INHALATION) at 12:07

## 2017-07-29 RX ADMIN — GABAPENTIN 600 MG: 300 CAPSULE ORAL at 09:07

## 2017-07-29 RX ADMIN — DIGOXIN 0.12 MG: 125 TABLET ORAL at 08:07

## 2017-07-29 RX ADMIN — ENOXAPARIN SODIUM 40 MG: 100 INJECTION SUBCUTANEOUS at 08:07

## 2017-07-29 RX ADMIN — MAGNESIUM OXIDE TAB 400 MG (241.3 MG ELEMENTAL MG) 400 MG: 400 (241.3 MG) TAB at 08:07

## 2017-07-29 RX ADMIN — ATORVASTATIN CALCIUM 80 MG: 20 TABLET, FILM COATED ORAL at 08:07

## 2017-07-29 RX ADMIN — PANTOPRAZOLE SODIUM 40 MG: 40 TABLET, DELAYED RELEASE ORAL at 08:07

## 2017-07-29 RX ADMIN — RANOLAZINE 500 MG: 500 TABLET, FILM COATED, EXTENDED RELEASE ORAL at 08:07

## 2017-07-29 RX ADMIN — LIDOCAINE 1 PATCH: 50 PATCH TOPICAL at 09:07

## 2017-07-29 RX ADMIN — POLYETHYLENE GLYCOL 3350 17 G: 17 POWDER, FOR SOLUTION ORAL at 08:07

## 2017-07-29 RX ADMIN — HYDROMORPHONE HYDROCHLORIDE 0.5 MG: 1 INJECTION, SOLUTION INTRAMUSCULAR; INTRAVENOUS; SUBCUTANEOUS at 12:07

## 2017-07-29 RX ADMIN — WARFARIN SODIUM 5 MG: 5 TABLET ORAL at 05:07

## 2017-07-29 RX ADMIN — GUAIFENESIN 1200 MG: 600 TABLET, EXTENDED RELEASE ORAL at 09:07

## 2017-07-29 RX ADMIN — GABAPENTIN 600 MG: 300 CAPSULE ORAL at 06:07

## 2017-07-29 NOTE — PLAN OF CARE
Problem: Diabetes, Type 2 (Adult)  Goal: Signs and Symptoms of Listed Potential Problems Will be Absent, Minimized or Managed (Diabetes, Type 2)  Signs and symptoms of listed potential problems will be absent, minimized or managed by discharge/transition of care (reference Diabetes, Type 2 (Adult) CPG).   Outcome: Ongoing (interventions implemented as appropriate)  CBG required no coverage.     Problem: Fall Risk (Adult)  Goal: Absence of Falls  Patient will demonstrate the desired outcomes by discharge/transition of care.   Outcome: Ongoing (interventions implemented as appropriate)  No falls or trauma during shift bed in low position with wheels lock and call light in reach.

## 2017-07-29 NOTE — ASSESSMENT & PLAN NOTE
- Treated w/ high dose steroids w/o improvement, no indication for further steroids. Continued DuoNeb and budesonide.

## 2017-07-29 NOTE — PROGRESS NOTES
[facility] Hospital Medicine  Progress Note    Patient Name: Shaan Brown  MRN: [id number]  Patient Class: IP- Inpatient   Admission Date: 7/27/2017  Length of Stay: 2 days  Attending Physician: Donnie Rueda MD  Primary Care Provider: Mendel Ferguson Iii, MD    Hospital Medicine Team: [facility] HOSP MED 1 Riley Castellano MD    Subjective:     Principal Problem:Acute on chronic respiratory failure with hypoxia and hypercapnia    HPI:  Shaan Brown is a 62-year-old male w/ chronic mixed hypercapnia and hypoxemia 2/2 COPD, HFpEF, HATTIE and obesity hypoventilation who's on 4-5 liters of continuous supplemental O2 (SpO2 typically in the low 90s-80s)  w/ Trilogy at night. He presented to [facility] on 7/13 via EMS for worsening hypoxia and altered mental status. Initially admitted to the ICU. Worsening hypoxia was thought to be 2/2 to pulmonary HTN w/ PA pressures of 71. Bubble study was negative for shunt. Pt was also started on therapeutic Lovenox and was being bridged to warfarin for suspected PE, despite negative CTA. Finished abx treatment for RLL PNA. Diuretics were held 2/2 to hypotension. Pt has what looks to be a NSTEMI 2/2 to demand, however patient was not tolerate of beta-blocker therapy 2/2 to hypoxia. No improvement in hypoxia despite steroids. Transferred to [facility] for evaluation by pulmonology for pulmonary HTN.   On arrive, pt was hypoxic on 5 liters of supplemental O2. He was complaining of lower back pain.     Hospital Course:  Transferred from [facility] to [facility]'s IM-1 service for evaluation of worsening pulmonary HTN. He was continued on scheduled duonebs, budesonide full dose lovenox. Diuresis with lasix 40 IV BID was started. Pulmonology was consulted and recommended continued diuresis and aerosol treatments, heart transplant consult for PHtn, and stopping full dose AC. Heart transplant was consulted and recommended repeating the ECHO following  aggressive diuresis.     Interval History: NAEON. Pt reports resolution of his anginal chest pain but continued 10/10 L chest wall pain that limits deep respiration. Otherwise reports breathing is stable and he is no longer becoming dizzy on standing. Feels he is diuresing well, UOP 1890 cc in last 24 hrs.     Review of Systems  Objective:     Vital Signs (Most Recent):  Temp: 97.5 °F (36.4 °C) (07/29/17 0338)  Pulse: 62 (07/29/17 0729)  Resp: 16 (07/29/17 0729)  BP: (!) 97/54 (07/29/17 0338)  SpO2: (!) 90 % (07/29/17 0729) Vital Signs (24h Range):  Temp:  [97.5 °F (36.4 °C)-97.9 °F (36.6 °C)] 97.5 °F (36.4 °C)  Pulse:  [59-76] 62  Resp:  [15-23] 16  SpO2:  [87 %-95 %] 90 %  BP: ()/(54-73) 97/54     Weight: 127.5 kg (281 lb 1.4 oz)  Body mass index is 39.2 kg/m².    Intake/Output Summary (Last 24 hours) at 07/29/17 0823  Last data filed at 07/29/17 0642   Gross per 24 hour   Intake                0 ml   Output             4150 ml   Net            -4150 ml      Physical Exam   Constitutional: He is oriented to person, place, and time. He appears well-developed and well-nourished. No distress.   Eyes: EOM are normal.   Neck: Neck supple.   Cardiovascular: Normal rate, regular rhythm, normal heart sounds and intact distal pulses.    Pulmonary/Chest: Effort normal. He has no wheezes. He exhibits tenderness (L chest wall).   Course breath sounds   Abdominal: Soft. Bowel sounds are normal. He exhibits distension. There is no tenderness.   Musculoskeletal: He exhibits edema and tenderness.   Neurological: He is alert and oriented to person, place, and time.   Skin: Skin is warm and dry. Capillary refill takes less than 2 seconds.       Significant Labs:   CBC:   Recent Labs  Lab 07/28/17  0106 07/28/17  0527 07/29/17  0409   WBC 9.72 10.08 7.77   HGB 14.9 14.5 15.0   HCT 45.8 45.0 46.1    200 170     CMP:   Recent Labs  Lab 07/28/17  0106 07/28/17  0527 07/29/17  0409    142 143   K 4.1 5.1 3.8     101 101   CO2 29 33* 28   * 110 88   BUN 26* 27* 29*   CREATININE 1.2 1.1 1.2   CALCIUM 9.5 9.4 9.0   PROT 6.7 6.5 6.2   ALBUMIN 3.0* 2.9* 3.0*   BILITOT 1.6* 1.4* 1.6*   ALKPHOS 86 75 92   AST 16 18 24   ALT 26 26 29   ANIONGAP 11 8 14   EGFRNONAA >60.0 >60.0 >60.0     Cardiac Markers:   Recent Labs  Lab 07/28/17  1527   *     Coagulation:   Recent Labs  Lab 07/29/17  0409   INR 1.4*     POCT Glucose:   Recent Labs  Lab 07/28/17  1645 07/28/17  2222 07/29/17  0730   POCTGLUCOSE 124* 118* 81     All pertinent labs within the past 24 hours have been reviewed.    Significant Imaging: I have reviewed all pertinent imaging results/findings within the past 24 hours.    Assessment/Plan:      * Acute on chronic respiratory failure with hypoxia and hypercapnia    - multifactorial with pulm HTN, RH failure, COPD, HATTIE, OHS  - Hypoxic at baseline w/ chronic supplemental O2 requirement (4-5 liters) and Trilogy at night, transferred for pulm-HTN eval.   - Continued guaifenesin, DuoNeb q6 and Budesonide q12.  - diuresing with lasix 80 IV BID, will repreat ECHO following adequate diuresis  - pulmonology and HTS consulted, appreciate recs        CAD (coronary artery disease)    - Troponemia prior to transfer thought to be 2/2 to demand ischemia. Currently on ranolazine, ASA and atorvastatin.   - pt reports stent placement in 5/2016. Will obtain outside records to determine the need for continuing plavix  - has had chset pain during this admission c/w previous angina; SL nitroglycerin PRN        Chronic obstructive pulmonary disease with acute exacerbation    - Treated w/ high dose steroids w/o improvement, no indication for further steroids. Continued DuoNeb and budesonide.         Paroxysmal atrial fibrillation    - Started on warfarin. Continue digoxin and amiodarone. Patient on telemetry.         Type 2 diabetes mellitus with hyperglycemia    Detemir 17 units BID w/ SSI .         Peripheral vascular disease    -  continuing ASA and pletal        HATTIE on CPAP    Trilogy QHS.        Morbid obesity with alveolar hypoventilation    Trilogy QHS.         Chronic hepatitis C    Patient seen by hepatology at [facility] and in discussion regarding treatment.  Appears that his lung disease is the ultimately limiting factor for treatment (his concerns regarding current treatments and effects on pulmonary status, as well as high resistance to of hepC strain to currently available regimens).  Patient scheduled to f/u with Hepatology August 2017 with Dr. Lopez.         Rib fractures    - pt with multiple L rib fractures which can be seen on CTA 7/24; pt reports falling on O2 tank  - percocet 10 and dilaudid 0.5 PRN pain       VTE Risk Mitigation         Ordered     warfarin (COUMADIN) tablet 5 mg  Daily     Route:  Oral        07/29/17 1127     enoxaparin injection 40 mg  Every 12 hours     Route:  Subcutaneous        07/28/17 2392          Riley Castellano MD  Department of Hospital Medicine   [facility]

## 2017-07-29 NOTE — ASSESSMENT & PLAN NOTE
Patient seen by hepatology at [facility] and in discussion regarding treatment.  Appears that his lung disease is the ultimately limiting factor for treatment (his concerns regarding current treatments and effects on pulmonary status, as well as high resistance to of hepC strain to currently available regimens).  Patient scheduled to f/u with Hepatology August 2017 with Dr. Lopez.

## 2017-07-29 NOTE — ASSESSMENT & PLAN NOTE
- Troponemia prior to transfer thought to be 2/2 to demand ischemia. Currently on ranolazine, ASA and atorvastatin.   - pt reports stent placement in 5/2016. Will obtain outside records to determine the need for continuing plavix  - has had chset pain during this admission c/w previous angina; SL nitroglycerin PRN

## 2017-07-29 NOTE — SUBJECTIVE & OBJECTIVE
Interval History: NAEON. Pt reports resolution of his anginal chest pain but continued 10/10 L chest wall pain that limits deep respiration. Otherwise reports breathing is stable and he is no longer becoming dizzy on standing. Feels he is diuresing well, UOP 1890 cc in last 24 hrs.     Review of Systems  Objective:     Vital Signs (Most Recent):  Temp: 97.5 °F (36.4 °C) (07/29/17 0338)  Pulse: 62 (07/29/17 0729)  Resp: 16 (07/29/17 0729)  BP: (!) 97/54 (07/29/17 0338)  SpO2: (!) 90 % (07/29/17 0729) Vital Signs (24h Range):  Temp:  [97.5 °F (36.4 °C)-97.9 °F (36.6 °C)] 97.5 °F (36.4 °C)  Pulse:  [59-76] 62  Resp:  [15-23] 16  SpO2:  [87 %-95 %] 90 %  BP: ()/(54-73) 97/54     Weight: 127.5 kg (281 lb 1.4 oz)  Body mass index is 39.2 kg/m².    Intake/Output Summary (Last 24 hours) at 07/29/17 0823  Last data filed at 07/29/17 0642   Gross per 24 hour   Intake                0 ml   Output             4150 ml   Net            -4150 ml      Physical Exam   Constitutional: He is oriented to person, place, and time. He appears well-developed and well-nourished. No distress.   Eyes: EOM are normal.   Neck: Neck supple.   Cardiovascular: Normal rate, regular rhythm, normal heart sounds and intact distal pulses.    Pulmonary/Chest: Effort normal. He has no wheezes. He exhibits tenderness (L chest wall).   Course breath sounds   Abdominal: Soft. Bowel sounds are normal. He exhibits distension. There is no tenderness.   Musculoskeletal: He exhibits edema and tenderness.   Neurological: He is alert and oriented to person, place, and time.   Skin: Skin is warm and dry. Capillary refill takes less than 2 seconds.       Significant Labs:   CBC:   Recent Labs  Lab 07/28/17  0106 07/28/17  0527 07/29/17  0409   WBC 9.72 10.08 7.77   HGB 14.9 14.5 15.0   HCT 45.8 45.0 46.1    200 170     CMP:   Recent Labs  Lab 07/28/17  0106 07/28/17  0527 07/29/17  0409    142 143   K 4.1 5.1 3.8    101 101   CO2 29 33* 28    * 110 88   BUN 26* 27* 29*   CREATININE 1.2 1.1 1.2   CALCIUM 9.5 9.4 9.0   PROT 6.7 6.5 6.2   ALBUMIN 3.0* 2.9* 3.0*   BILITOT 1.6* 1.4* 1.6*   ALKPHOS 86 75 92   AST 16 18 24   ALT 26 26 29   ANIONGAP 11 8 14   EGFRNONAA >60.0 >60.0 >60.0     Cardiac Markers:   Recent Labs  Lab 07/28/17  1527   *     Coagulation:   Recent Labs  Lab 07/29/17  0409   INR 1.4*     POCT Glucose:   Recent Labs  Lab 07/28/17  1645 07/28/17  2222 07/29/17  0730   POCTGLUCOSE 124* 118* 81     All pertinent labs within the past 24 hours have been reviewed.    Significant Imaging: I have reviewed all pertinent imaging results/findings within the past 24 hours.

## 2017-07-29 NOTE — ASSESSMENT & PLAN NOTE
- multifactorial with pulm HTN, RH failure, COPD, HATTIE, OHS  - Hypoxic at baseline w/ chronic supplemental O2 requirement (4-5 liters) and Trilogy at night, transferred for pulm-HTN eval.   - Continued guaifenesin, DuoNeb q6 and Budesonide q12.  - diuresing with lasix 80 IV BID, will repreat ECHO following adequate diuresis  - pulmonology and HTS consulted, appreciate recs

## 2017-07-29 NOTE — ASSESSMENT & PLAN NOTE
- pt with multiple L rib fractures which can be seen on CTA 7/24; pt reports falling on O2 tank  - percocet 10 and dilaudid 0.5 PRN pain

## 2017-07-29 NOTE — PLAN OF CARE
Problem: Patient Care Overview  Goal: Plan of Care Review  Outcome: Ongoing (interventions implemented as appropriate)  Patient remained free from falls, and pressure ulcers throughout the day.  Patient remained on 5L NC with oxygen saturation 88-92%.  CBG was monitored and has remained within normal limits.  Patient now resting quietly in bed with side rails up x3, bed locked in lowest position, and call bell in reach.

## 2017-07-30 LAB
ALBUMIN SERPL BCP-MCNC: 3 G/DL
ALP SERPL-CCNC: 86 U/L
ALT SERPL W/O P-5'-P-CCNC: 42 U/L
ANION GAP SERPL CALC-SCNC: 11 MMOL/L
AST SERPL-CCNC: 33 U/L
BASOPHILS # BLD AUTO: 0.02 K/UL
BASOPHILS NFR BLD: 0.2 %
BILIRUB SERPL-MCNC: 2.6 MG/DL
BUN SERPL-MCNC: 31 MG/DL
CALCIUM SERPL-MCNC: 8.9 MG/DL
CHLORIDE SERPL-SCNC: 97 MMOL/L
CO2 SERPL-SCNC: 31 MMOL/L
CREAT SERPL-MCNC: 1.3 MG/DL
DIFFERENTIAL METHOD: ABNORMAL
EOSINOPHIL # BLD AUTO: 0.2 K/UL
EOSINOPHIL NFR BLD: 2 %
ERYTHROCYTE [DISTWIDTH] IN BLOOD BY AUTOMATED COUNT: 21.6 %
EST. GFR  (AFRICAN AMERICAN): >60 ML/MIN/1.73 M^2
EST. GFR  (NON AFRICAN AMERICAN): 58.1 ML/MIN/1.73 M^2
GLUCOSE SERPL-MCNC: 86 MG/DL
HCT VFR BLD AUTO: 47.3 %
HGB BLD-MCNC: 15.2 G/DL
INR PPP: 1.3
LYMPHOCYTES # BLD AUTO: 2.5 K/UL
LYMPHOCYTES NFR BLD: 29 %
MAGNESIUM SERPL-MCNC: 2 MG/DL
MCH RBC QN AUTO: 29.6 PG
MCHC RBC AUTO-ENTMCNC: 32.1 G/DL
MCV RBC AUTO: 92 FL
MONOCYTES # BLD AUTO: 0.8 K/UL
MONOCYTES NFR BLD: 9.6 %
NEUTROPHILS # BLD AUTO: 5 K/UL
NEUTROPHILS NFR BLD: 58.8 %
PLATELET # BLD AUTO: 179 K/UL
PMV BLD AUTO: 9.9 FL
POCT GLUCOSE: 104 MG/DL (ref 70–110)
POCT GLUCOSE: 117 MG/DL (ref 70–110)
POCT GLUCOSE: 130 MG/DL (ref 70–110)
POCT GLUCOSE: 85 MG/DL (ref 70–110)
POTASSIUM SERPL-SCNC: 3.7 MMOL/L
PROT SERPL-MCNC: 6.1 G/DL
PROTHROMBIN TIME: 13.1 SEC
RBC # BLD AUTO: 5.13 M/UL
SODIUM SERPL-SCNC: 139 MMOL/L
TROPONIN I SERPL DL<=0.01 NG/ML-MCNC: 0.03 NG/ML
WBC # BLD AUTO: 8.53 K/UL

## 2017-07-30 PROCEDURE — 85610 PROTHROMBIN TIME: CPT

## 2017-07-30 PROCEDURE — 94664 DEMO&/EVAL PT USE INHALER: CPT

## 2017-07-30 PROCEDURE — 25000003 PHARM REV CODE 250: Performed by: STUDENT IN AN ORGANIZED HEALTH CARE EDUCATION/TRAINING PROGRAM

## 2017-07-30 PROCEDURE — 85025 COMPLETE CBC W/AUTO DIFF WBC: CPT

## 2017-07-30 PROCEDURE — 83735 ASSAY OF MAGNESIUM: CPT

## 2017-07-30 PROCEDURE — 93010 ELECTROCARDIOGRAM REPORT: CPT | Mod: ,,, | Performed by: INTERNAL MEDICINE

## 2017-07-30 PROCEDURE — 94761 N-INVAS EAR/PLS OXIMETRY MLT: CPT

## 2017-07-30 PROCEDURE — 27000221 HC OXYGEN, UP TO 24 HOURS

## 2017-07-30 PROCEDURE — 84484 ASSAY OF TROPONIN QUANT: CPT

## 2017-07-30 PROCEDURE — 93005 ELECTROCARDIOGRAM TRACING: CPT

## 2017-07-30 PROCEDURE — 25000003 PHARM REV CODE 250

## 2017-07-30 PROCEDURE — 25000242 PHARM REV CODE 250 ALT 637 W/ HCPCS: Performed by: INTERNAL MEDICINE

## 2017-07-30 PROCEDURE — 94640 AIRWAY INHALATION TREATMENT: CPT

## 2017-07-30 PROCEDURE — 11000001 HC ACUTE MED/SURG PRIVATE ROOM

## 2017-07-30 PROCEDURE — 63600175 PHARM REV CODE 636 W HCPCS: Performed by: STUDENT IN AN ORGANIZED HEALTH CARE EDUCATION/TRAINING PROGRAM

## 2017-07-30 PROCEDURE — 99900035 HC TECH TIME PER 15 MIN (STAT)

## 2017-07-30 PROCEDURE — 99232 SBSQ HOSP IP/OBS MODERATE 35: CPT | Mod: GC,,, | Performed by: HOSPITALIST

## 2017-07-30 PROCEDURE — 27000173 HC ACAPELLA DEVICE DH OR DM

## 2017-07-30 PROCEDURE — 36415 COLL VENOUS BLD VENIPUNCTURE: CPT

## 2017-07-30 PROCEDURE — 27000190 HC CPAP FULL FACE MASK W/VALVE

## 2017-07-30 PROCEDURE — 25000003 PHARM REV CODE 250: Performed by: INTERNAL MEDICINE

## 2017-07-30 PROCEDURE — 80053 COMPREHEN METABOLIC PANEL: CPT

## 2017-07-30 PROCEDURE — 25000242 PHARM REV CODE 250 ALT 637 W/ HCPCS: Performed by: STUDENT IN AN ORGANIZED HEALTH CARE EDUCATION/TRAINING PROGRAM

## 2017-07-30 RX ORDER — RAMELTEON 8 MG/1
8 TABLET ORAL NIGHTLY PRN
Status: DISCONTINUED | OUTPATIENT
Start: 2017-07-30 | End: 2017-08-09 | Stop reason: HOSPADM

## 2017-07-30 RX ORDER — HYDROXYZINE HYDROCHLORIDE 25 MG/1
25 TABLET, FILM COATED ORAL NIGHTLY PRN
Status: DISCONTINUED | OUTPATIENT
Start: 2017-07-30 | End: 2017-08-01

## 2017-07-30 RX ORDER — FLUTICASONE PROPIONATE 50 MCG
2 SPRAY, SUSPENSION (ML) NASAL DAILY
Status: DISCONTINUED | OUTPATIENT
Start: 2017-07-30 | End: 2017-08-09 | Stop reason: HOSPADM

## 2017-07-30 RX ORDER — RAMELTEON 8 MG/1
TABLET ORAL
Status: COMPLETED
Start: 2017-07-30 | End: 2017-07-30

## 2017-07-30 RX ADMIN — GABAPENTIN 600 MG: 300 CAPSULE ORAL at 01:07

## 2017-07-30 RX ADMIN — RAMELTEON 8 MG: 8 TABLET, FILM COATED ORAL at 03:07

## 2017-07-30 RX ADMIN — HYDROMORPHONE HYDROCHLORIDE 0.5 MG: 1 INJECTION, SOLUTION INTRAMUSCULAR; INTRAVENOUS; SUBCUTANEOUS at 08:07

## 2017-07-30 RX ADMIN — DIGOXIN 0.12 MG: 125 TABLET ORAL at 09:07

## 2017-07-30 RX ADMIN — HYDROMORPHONE HYDROCHLORIDE 0.5 MG: 1 INJECTION, SOLUTION INTRAMUSCULAR; INTRAVENOUS; SUBCUTANEOUS at 11:07

## 2017-07-30 RX ADMIN — FLUTICASONE PROPIONATE 2 SPRAY: 50 SPRAY, METERED NASAL at 04:07

## 2017-07-30 RX ADMIN — ENOXAPARIN SODIUM 40 MG: 100 INJECTION SUBCUTANEOUS at 08:07

## 2017-07-30 RX ADMIN — ATORVASTATIN CALCIUM 80 MG: 20 TABLET, FILM COATED ORAL at 09:07

## 2017-07-30 RX ADMIN — HYDROMORPHONE HYDROCHLORIDE 0.5 MG: 1 INJECTION, SOLUTION INTRAMUSCULAR; INTRAVENOUS; SUBCUTANEOUS at 07:07

## 2017-07-30 RX ADMIN — RANOLAZINE 500 MG: 500 TABLET, FILM COATED, EXTENDED RELEASE ORAL at 09:07

## 2017-07-30 RX ADMIN — LIDOCAINE 1 PATCH: 50 PATCH TOPICAL at 08:07

## 2017-07-30 RX ADMIN — ALPRAZOLAM 1 MG: 1 TABLET ORAL at 12:07

## 2017-07-30 RX ADMIN — INSULIN DETEMIR 15 UNITS: 100 INJECTION, SOLUTION SUBCUTANEOUS at 09:07

## 2017-07-30 RX ADMIN — TIZANIDINE 4 MG: 4 TABLET ORAL at 01:07

## 2017-07-30 RX ADMIN — FUROSEMIDE 80 MG: 10 INJECTION, SOLUTION INTRAVENOUS at 11:07

## 2017-07-30 RX ADMIN — INSULIN DETEMIR 15 UNITS: 100 INJECTION, SOLUTION SUBCUTANEOUS at 08:07

## 2017-07-30 RX ADMIN — WARFARIN SODIUM 5 MG: 5 TABLET ORAL at 04:07

## 2017-07-30 RX ADMIN — IPRATROPIUM BROMIDE AND ALBUTEROL SULFATE 3 ML: .5; 3 SOLUTION RESPIRATORY (INHALATION) at 07:07

## 2017-07-30 RX ADMIN — OXYCODONE HYDROCHLORIDE AND ACETAMINOPHEN 1 TABLET: 10; 325 TABLET ORAL at 12:07

## 2017-07-30 RX ADMIN — BUDESONIDE 0.5 MG: 0.25 SUSPENSION RESPIRATORY (INHALATION) at 07:07

## 2017-07-30 RX ADMIN — GUAIFENESIN 1200 MG: 600 TABLET, EXTENDED RELEASE ORAL at 09:07

## 2017-07-30 RX ADMIN — FUROSEMIDE 80 MG: 10 INJECTION, SOLUTION INTRAVENOUS at 05:07

## 2017-07-30 RX ADMIN — GABAPENTIN 600 MG: 300 CAPSULE ORAL at 05:07

## 2017-07-30 RX ADMIN — MAGNESIUM OXIDE TAB 400 MG (241.3 MG ELEMENTAL MG) 400 MG: 400 (241.3 MG) TAB at 08:07

## 2017-07-30 RX ADMIN — TAMSULOSIN HYDROCHLORIDE 0.4 MG: 0.4 CAPSULE ORAL at 09:07

## 2017-07-30 RX ADMIN — ENOXAPARIN SODIUM 40 MG: 100 INJECTION SUBCUTANEOUS at 09:07

## 2017-07-30 RX ADMIN — ASPIRIN 81 MG: 81 TABLET, COATED ORAL at 09:07

## 2017-07-30 RX ADMIN — PANTOPRAZOLE SODIUM 40 MG: 40 TABLET, DELAYED RELEASE ORAL at 09:07

## 2017-07-30 RX ADMIN — RAMELTEON 8 MG: 8 TABLET, FILM COATED ORAL at 08:07

## 2017-07-30 RX ADMIN — MAGNESIUM OXIDE TAB 400 MG (241.3 MG ELEMENTAL MG) 400 MG: 400 (241.3 MG) TAB at 09:07

## 2017-07-30 RX ADMIN — RANOLAZINE 500 MG: 500 TABLET, FILM COATED, EXTENDED RELEASE ORAL at 08:07

## 2017-07-30 RX ADMIN — IPRATROPIUM BROMIDE AND ALBUTEROL SULFATE 3 ML: .5; 3 SOLUTION RESPIRATORY (INHALATION) at 01:07

## 2017-07-30 RX ADMIN — GABAPENTIN 600 MG: 300 CAPSULE ORAL at 09:07

## 2017-07-30 RX ADMIN — HYDROMORPHONE HYDROCHLORIDE 0.5 MG: 1 INJECTION, SOLUTION INTRAMUSCULAR; INTRAVENOUS; SUBCUTANEOUS at 02:07

## 2017-07-30 RX ADMIN — GUAIFENESIN 1200 MG: 600 TABLET, EXTENDED RELEASE ORAL at 08:07

## 2017-07-30 RX ADMIN — HYDROMORPHONE HYDROCHLORIDE 0.5 MG: 1 INJECTION, SOLUTION INTRAMUSCULAR; INTRAVENOUS; SUBCUTANEOUS at 03:07

## 2017-07-30 NOTE — PROGRESS NOTES
[facility] Hospital Medicine  Progress Note    Patient Name: Shaan Brown  MRN: [id number]  Patient Class: IP- Inpatient   Admission Date: 7/27/2017  Length of Stay: 3 days  Attending Physician: Donnie Rueda MD  Primary Care Provider: Mendel Ferguson Iii, MD    [facility] [place]: [facility] Riley Castellano MD    Subjective:     Principal Problem:Acute on chronic respiratory failure with hypoxia and hypercapnia    HPI:  Shaan Brown is a 62-year-old male w/ chronic mixed hypercapnia and hypoxemia 2/2 COPD, HFpEF, HATTIE and obesity hypoventilation who's on 4-5 liters of continuous supplemental O2 (SpO2 typically in the low 90s-80s)  w/ Trilogy at night. [place] on 7[place]13 via EMS for worsening hypoxia and altered mental status. Initially admitted to the ICU. Worsening hypoxia was thought to be 2/2 to pulmonary HTN w/ PA pressures of 71. Bubble study was negative for shunt. Pt was also started on therapeutic Lovenox and was being bridged to warfarin for suspected PE, despite negative CTA. Finished abx treatment for RLL PNA. Diuretics were held 2/2 to hypotension. Pt has what looks to be a NSTEMI 2/2 to demand, however patient was not tolerate of beta-blocker therapy 2/2 to hypoxia. No improvement in hypoxia despite steroids. [place] [facility] [place] for pulmonary HTN.   On arrive, pt was hypoxic on 5 liters of supplemental O2. He was complaining of lower back pain.     Hospital Course:  Transfer[place] [facility] for evaluation of worsening pulmonary HTN. He was continued on scheduled duonebs, budesonide full dose lovenox. Diuresis with lasix 40 IV BID was started. Pulmonology was consulted and recommended continued diuresis and aerosol treatments, heart transplant consult for PHtn, and stopping full dose AC. Heart transplant was consulted and recommended repeating the ECHO following  aggressive diuresis. Pt c/o L chest wall pain that he reports began after he fell onto an O2 container. On review of CTA from 7/24, pt has multiple fractured ribs on L side; PRN pain medication ordered. He also reported episodes of chest pain during this admission which were consistent with previous episodes of angina; workup not concerning for infarction.     Interval History: Overnight, he complained of chest pain which radiated to his right arm, consistent with previous episodes of angina. EKG and troponin were unremarkable and pt reports that the pain subsided after ~1 hr after he belched and feels that the pain may have been related to indigestion. He continues to have L sided chest pain related to his rib fx; pain improved with current regimen. He reports that his breathing is back to his baseline and he has noticed improvement in his LE edema since starting diuresis.    Review of Systems  Objective:     Vital Signs (Most Recent):  Temp: 98 °F (36.7 °C) (07/30/17 0833)  Pulse: 68 (07/30/17 0833)  Resp: 17 (07/30/17 0833)  BP: (!) 92/54 (07/30/17 0833)  SpO2: (!) 88 % (07/30/17 0833) Vital Signs (24h Range):  Temp:  [97.8 °F (36.6 °C)-98.5 °F (36.9 °C)] 98 °F (36.7 °C)  Pulse:  [61-94] 68  Resp:  [15-20] 17  SpO2:  [86 %-90 %] 88 %  BP: ()/(54-68) 92/54     Weight: 127.5 kg (281 lb 1.4 oz)  Body mass index is 39.2 kg/m².    Intake/Output Summary (Last 24 hours) at 07/30/17 0852  Last data filed at 07/30/17 0528   Gross per 24 hour   Intake              960 ml   Output             3300 ml   Net            -2340 ml      Physical Exam   Constitutional: He is oriented to person, place, and time. He appears well-developed and well-nourished. No distress.   Eyes: EOM are normal.   Neck: Neck supple.   Cardiovascular: Normal rate, regular rhythm, normal heart sounds and intact distal pulses.    Pulmonary/Chest: Effort normal. He has no wheezes. He exhibits tenderness (L chest wall).   Course breath  sounds  Abdominal: Soft. Bowel sounds are normal. He exhibits distension. There is no tenderness.   Musculoskeletal: He exhibits edema (2+ LE edema bilaterally).   Neurological: He is alert and oriented to person, place, and time.   Skin: Skin is warm and dry. Capillary refill takes less than 2 seconds.       Significant Labs:   CBC:   Recent Labs  Lab 07/29/17  0409 07/30/17  0511   WBC 7.77 8.53   HGB 15.0 15.2   HCT 46.1 47.3    179     CMP:   Recent Labs  Lab 07/29/17  0409 07/30/17  0511    139   K 3.8 3.7    97   CO2 28 31*   GLU 88 86   BUN 29* 31*   CREATININE 1.2 1.3   CALCIUM 9.0 8.9   PROT 6.2 6.1   ALBUMIN 3.0* 3.0*   BILITOT 1.6* 2.6*   ALKPHOS 92 86   AST 24 33   ALT 29 42   ANIONGAP 14 11   EGFRNONAA >60.0 58.1*     Cardiac Markers:   Recent Labs  Lab 07/28/17  1527   *     Coagulation:   Recent Labs  Lab 07/30/17  0511   INR 1.3*     POCT Glucose:   Recent Labs  Lab 07/29/17  1709 07/29/17  2125 07/30/17  0727   POCTGLUCOSE 103 151* 85     Troponin:   Recent Labs  Lab 07/30/17  0053   TROPONINI 0.027*     All pertinent labs within the past 24 hours have been reviewed.    Significant Imaging: I have reviewed all pertinent imaging results/findings within the past 24 hours.    Assessment/Plan:      * Acute on chronic respiratory failure with hypoxia and hypercapnia    - multifactorial with pulm HTN, RH failure, COPD, HATTIE, OHS  - Hypoxic at baseline w/ chronic supplemental O2 requirement (4-5 liters) and Trilogy at night, transferred for pulm-HTN eval.   - Continued guaifenesin, DuoNeb q6 and Budesonide q12.  - diuresing with lasix 80 IV BID, will repreat ECHO following adequate diuresis  - pulmonology and HTS consulted, appreciate recs        CAD (coronary artery disease)    - Troponemia prior to transfer thought to be 2/2 to demand ischemia. Currently on ranolazine, ASA and atorvastatin.   - pt reports stent placement in 5/2016. Will obtain outside records to determine the  need for continuing plavix  - has had chest pain during this admission c/w previous angina; SL nitroglycerin PRN        Chronic obstructive pulmonary disease with acute exacerbation    - Treated w/ high dose steroids w/o improvement, no indication for further steroids. Continued DuoNeb and budesonide.         Paroxysmal atrial fibrillation    - Started on warfarin. Daily INR. Will d/c prophylactic lovenox when therapeutic  - Continue digoxin and amiodarone. Patient on telemetry.         Type 2 diabetes mellitus with hyperglycemia    - decreased Detemir to 15 units BID w/ SSI .         Peripheral vascular disease    - continuing ASA and pletal        HATTIE on CPAP    BiPAP QHS.        Morbid obesity with alveolar hypoventilation    BiPAP QHS.         Chronic hepatitis C    Patient seen [place] hepatology [place] [facility] and in discussion regarding treatment.  Appears that his lung disease is the ultimately limiting factor for treatment (his concerns regarding current treatments and effects on pulmonary status, as well as high resistance to of hepC strain to currently available regimens).  Patient scheduled to f/u with Hepatology August 2017 with Dr. Lopez.         Rib fractures    - pt with multiple L rib fractures which can be seen on CTA 7/24; pt reports falling on O2 tank  - percocet 10 and dilaudid 0.5 PRN pain       VTE Risk Mitigation         Ordered     warfarin (COUMADIN) tablet 5 mg  Daily     Route:  Oral        07/29/17 1127     enoxaparin injection 40 mg  Every 12 hours     Route:  Subcutaneous        07/28/17 6055          Riley Castellano MD  Department of Hospital Medicine   [facility]

## 2017-07-30 NOTE — ASSESSMENT & PLAN NOTE
Patient seen by hepatology at [facility] and in discussion regarding treatment.  Appears that his lung disease is the ultimately limiting factor for treatment (his concerns regarding current treatments and effects on pulmonary status, as well as high resistance to of hepC strain to currently available regimens).  Patient scheduled to f/u with Hepatology August 2017 with Dr. Lopez.

## 2017-07-30 NOTE — NURSING
"Pt complained of midsternal chest pain 7/10, radiating to right arm, stated " I dont' feel right", also complained that left side pain was still present after iv prn pain med given, no SOB expressed, see epic for vitals, md Choe notified. Stat chest xray and ekg with troponin ordered as well as ABG, pt is sitting up in bed now, will continue to monitor.  "

## 2017-07-30 NOTE — PROGRESS NOTES
MD notified of patient's BP 92/54, asymptomatic, and has no complaints at this time.  New orders were to hold IV lasix and isosorbide.  Will continue to monitor.

## 2017-07-30 NOTE — NURSING
"MD Choe came up and assessed pt, pt stated " I feel a little better now", md to reassess after test are completed, pt stable, will continue to monitor. Instructed to call if symptoms become intolerable or/and worsen.  "

## 2017-07-30 NOTE — PROGRESS NOTES
MD notified of patient's oxygen saturation 85-88% on 5 L NC, asymptomatic, and no complaints at this time.  This is patient's normal baseline at home, no new orders given.  Will continue to monitor.

## 2017-07-30 NOTE — PLAN OF CARE
Problem: Patient Care Overview  Goal: Plan of Care Review  Outcome: Ongoing (interventions implemented as appropriate)  Patient remained free from falls, and pressure ulcers throughout the day.  Patient remained on 5L NC with oxygen saturation 85-92%, also receiving IV lasix for diuresis.  CBG was monitored and has remained within normal limits.  Patient now resting quietly in bed with side rails up x3, bed locked in lowest position, and call bell in reach.

## 2017-07-30 NOTE — ASSESSMENT & PLAN NOTE
- Troponemia prior to transfer thought to be 2/2 to demand ischemia. Currently on ranolazine, ASA and atorvastatin.   - pt reports stent placement in 5/2016. Will obtain outside records to determine the need for continuing plavix  - has had chest pain during this admission c/w previous angina; SL nitroglycerin PRN

## 2017-07-30 NOTE — SUBJECTIVE & OBJECTIVE
Interval History: Overnight, he complained of chest pain which radiated to his right arm, consistent with previous episodes of angina. EKG and troponin were unremarkable and pt reports that the pain subsided after ~1 hr after he belched and feels that the pain may have been related to indigestion. He continues to have L sided chest pain related to his rib fx; pain improved with current regimen. He reports that his breathing is back to his baseline and he has noticed improvement in his LE edema since starting diuresis.    Review of Systems  Objective:     Vital Signs (Most Recent):  Temp: 98 °F (36.7 °C) (07/30/17 0833)  Pulse: 68 (07/30/17 0833)  Resp: 17 (07/30/17 0833)  BP: (!) 92/54 (07/30/17 0833)  SpO2: (!) 88 % (07/30/17 0833) Vital Signs (24h Range):  Temp:  [97.8 °F (36.6 °C)-98.5 °F (36.9 °C)] 98 °F (36.7 °C)  Pulse:  [61-94] 68  Resp:  [15-20] 17  SpO2:  [86 %-90 %] 88 %  BP: ()/(54-68) 92/54     Weight: 127.5 kg (281 lb 1.4 oz)  Body mass index is 39.2 kg/m².    Intake/Output Summary (Last 24 hours) at 07/30/17 0852  Last data filed at 07/30/17 0528   Gross per 24 hour   Intake              960 ml   Output             3300 ml   Net            -2340 ml      Physical Exam   Constitutional: He is oriented to person, place, and time. He appears well-developed and well-nourished. No distress.   Eyes: EOM are normal.   Neck: Neck supple.   Cardiovascular: Normal rate, regular rhythm, normal heart sounds and intact distal pulses.    Pulmonary/Chest: Effort normal. He has no wheezes. He exhibits tenderness (L chest wall).   Course breath sounds, poor air movement   Abdominal: Soft. Bowel sounds are normal. He exhibits distension. There is no tenderness.   Musculoskeletal: He exhibits edema (2+ LE edema bilaterally).   Neurological: He is alert and oriented to person, place, and time.   Skin: Skin is warm and dry. Capillary refill takes less than 2 seconds.       Significant Labs:   CBC:   Recent Labs  Lab  07/29/17  0409 07/30/17  0511   WBC 7.77 8.53   HGB 15.0 15.2   HCT 46.1 47.3    179     CMP:   Recent Labs  Lab 07/29/17  0409 07/30/17  0511    139   K 3.8 3.7    97   CO2 28 31*   GLU 88 86   BUN 29* 31*   CREATININE 1.2 1.3   CALCIUM 9.0 8.9   PROT 6.2 6.1   ALBUMIN 3.0* 3.0*   BILITOT 1.6* 2.6*   ALKPHOS 92 86   AST 24 33   ALT 29 42   ANIONGAP 14 11   EGFRNONAA >60.0 58.1*     Cardiac Markers:   Recent Labs  Lab 07/28/17  1527   *     Coagulation:   Recent Labs  Lab 07/30/17  0511   INR 1.3*     POCT Glucose:   Recent Labs  Lab 07/29/17  1709 07/29/17  2125 07/30/17  0727   POCTGLUCOSE 103 151* 85     Troponin:   Recent Labs  Lab 07/30/17  0053   TROPONINI 0.027*     All pertinent labs within the past 24 hours have been reviewed.    Significant Imaging: I have reviewed all pertinent imaging results/findings within the past 24 hours.

## 2017-07-31 LAB
ALBUMIN SERPL BCP-MCNC: 2.8 G/DL
ALP SERPL-CCNC: 76 U/L
ALT SERPL W/O P-5'-P-CCNC: 52 U/L
ANION GAP SERPL CALC-SCNC: 10 MMOL/L
AST SERPL-CCNC: 44 U/L
BASOPHILS # BLD AUTO: 0.02 K/UL
BASOPHILS NFR BLD: 0.3 %
BILIRUB SERPL-MCNC: 3.2 MG/DL
BUN SERPL-MCNC: 23 MG/DL
CALCIUM SERPL-MCNC: 8.3 MG/DL
CHLORIDE SERPL-SCNC: 99 MMOL/L
CO2 SERPL-SCNC: 30 MMOL/L
CREAT SERPL-MCNC: 0.9 MG/DL
DIFFERENTIAL METHOD: ABNORMAL
EOSINOPHIL # BLD AUTO: 0.2 K/UL
EOSINOPHIL NFR BLD: 2.6 %
ERYTHROCYTE [DISTWIDTH] IN BLOOD BY AUTOMATED COUNT: 21 %
EST. GFR  (AFRICAN AMERICAN): >60 ML/MIN/1.73 M^2
EST. GFR  (NON AFRICAN AMERICAN): >60 ML/MIN/1.73 M^2
ESTIMATED PA SYSTOLIC PRESSURE: 35.52
GLUCOSE SERPL-MCNC: 76 MG/DL
HCT VFR BLD AUTO: 46.2 %
HGB BLD-MCNC: 15.3 G/DL
INR PPP: 1.5
LYMPHOCYTES # BLD AUTO: 2 K/UL
LYMPHOCYTES NFR BLD: 28 %
MAGNESIUM SERPL-MCNC: 2 MG/DL
MCH RBC QN AUTO: 30 PG
MCHC RBC AUTO-ENTMCNC: 33.1 G/DL
MCV RBC AUTO: 91 FL
MONOCYTES # BLD AUTO: 0.7 K/UL
MONOCYTES NFR BLD: 9.8 %
NEUTROPHILS # BLD AUTO: 4.1 K/UL
NEUTROPHILS NFR BLD: 58.7 %
PLATELET # BLD AUTO: 142 K/UL
PMV BLD AUTO: 9.4 FL
POCT GLUCOSE: 115 MG/DL (ref 70–110)
POCT GLUCOSE: 126 MG/DL (ref 70–110)
POCT GLUCOSE: 163 MG/DL (ref 70–110)
POCT GLUCOSE: 81 MG/DL (ref 70–110)
POTASSIUM SERPL-SCNC: 3.7 MMOL/L
PROT SERPL-MCNC: 5.8 G/DL
PROTHROMBIN TIME: 15.1 SEC
RBC # BLD AUTO: 5.1 M/UL
RETIRED EF AND QEF - SEE NOTES: 55 (ref 55–65)
SODIUM SERPL-SCNC: 139 MMOL/L
WBC # BLD AUTO: 7.04 K/UL

## 2017-07-31 PROCEDURE — 99232 SBSQ HOSP IP/OBS MODERATE 35: CPT | Mod: GC,,, | Performed by: INTERNAL MEDICINE

## 2017-07-31 PROCEDURE — 25000242 PHARM REV CODE 250 ALT 637 W/ HCPCS: Performed by: INTERNAL MEDICINE

## 2017-07-31 PROCEDURE — 94640 AIRWAY INHALATION TREATMENT: CPT

## 2017-07-31 PROCEDURE — 99900035 HC TECH TIME PER 15 MIN (STAT)

## 2017-07-31 PROCEDURE — 25000003 PHARM REV CODE 250: Performed by: INTERNAL MEDICINE

## 2017-07-31 PROCEDURE — 93306 TTE W/DOPPLER COMPLETE: CPT | Mod: 26,,, | Performed by: INTERNAL MEDICINE

## 2017-07-31 PROCEDURE — 83735 ASSAY OF MAGNESIUM: CPT

## 2017-07-31 PROCEDURE — 94660 CPAP INITIATION&MGMT: CPT

## 2017-07-31 PROCEDURE — 11000001 HC ACUTE MED/SURG PRIVATE ROOM

## 2017-07-31 PROCEDURE — 99232 SBSQ HOSP IP/OBS MODERATE 35: CPT | Mod: GC,,, | Performed by: HOSPITALIST

## 2017-07-31 PROCEDURE — 63600175 PHARM REV CODE 636 W HCPCS: Performed by: STUDENT IN AN ORGANIZED HEALTH CARE EDUCATION/TRAINING PROGRAM

## 2017-07-31 PROCEDURE — 85610 PROTHROMBIN TIME: CPT

## 2017-07-31 PROCEDURE — 25000003 PHARM REV CODE 250: Performed by: STUDENT IN AN ORGANIZED HEALTH CARE EDUCATION/TRAINING PROGRAM

## 2017-07-31 PROCEDURE — C8929 TTE W OR WO FOL WCON,DOPPLER: HCPCS

## 2017-07-31 PROCEDURE — 27000173 HC ACAPELLA DEVICE DH OR DM

## 2017-07-31 PROCEDURE — 85025 COMPLETE CBC W/AUTO DIFF WBC: CPT

## 2017-07-31 PROCEDURE — 80053 COMPREHEN METABOLIC PANEL: CPT

## 2017-07-31 PROCEDURE — 27000221 HC OXYGEN, UP TO 24 HOURS

## 2017-07-31 PROCEDURE — 36415 COLL VENOUS BLD VENIPUNCTURE: CPT

## 2017-07-31 PROCEDURE — 94664 DEMO&/EVAL PT USE INHALER: CPT

## 2017-07-31 PROCEDURE — 94761 N-INVAS EAR/PLS OXIMETRY MLT: CPT

## 2017-07-31 RX ORDER — FUROSEMIDE 10 MG/ML
80 INJECTION INTRAMUSCULAR; INTRAVENOUS ONCE
Status: COMPLETED | OUTPATIENT
Start: 2017-07-31 | End: 2017-07-31

## 2017-07-31 RX ADMIN — POLYETHYLENE GLYCOL 3350 17 G: 17 POWDER, FOR SOLUTION ORAL at 08:07

## 2017-07-31 RX ADMIN — HYDROMORPHONE HYDROCHLORIDE 0.5 MG: 1 INJECTION, SOLUTION INTRAMUSCULAR; INTRAVENOUS; SUBCUTANEOUS at 04:07

## 2017-07-31 RX ADMIN — IPRATROPIUM BROMIDE AND ALBUTEROL SULFATE 3 ML: .5; 3 SOLUTION RESPIRATORY (INHALATION) at 08:07

## 2017-07-31 RX ADMIN — MAGNESIUM OXIDE TAB 400 MG (241.3 MG ELEMENTAL MG) 400 MG: 400 (241.3 MG) TAB at 08:07

## 2017-07-31 RX ADMIN — TAMSULOSIN HYDROCHLORIDE 0.4 MG: 0.4 CAPSULE ORAL at 08:07

## 2017-07-31 RX ADMIN — INSULIN DETEMIR 15 UNITS: 100 INJECTION, SOLUTION SUBCUTANEOUS at 09:07

## 2017-07-31 RX ADMIN — HYDROMORPHONE HYDROCHLORIDE 0.5 MG: 1 INJECTION, SOLUTION INTRAMUSCULAR; INTRAVENOUS; SUBCUTANEOUS at 08:07

## 2017-07-31 RX ADMIN — FLUTICASONE PROPIONATE 2 SPRAY: 50 SPRAY, METERED NASAL at 08:07

## 2017-07-31 RX ADMIN — HYDROMORPHONE HYDROCHLORIDE 0.5 MG: 1 INJECTION, SOLUTION INTRAMUSCULAR; INTRAVENOUS; SUBCUTANEOUS at 12:07

## 2017-07-31 RX ADMIN — FUROSEMIDE 80 MG: 10 INJECTION, SOLUTION INTRAVENOUS at 03:07

## 2017-07-31 RX ADMIN — ENOXAPARIN SODIUM 40 MG: 100 INJECTION SUBCUTANEOUS at 08:07

## 2017-07-31 RX ADMIN — ISOSORBIDE MONONITRATE 30 MG: 30 TABLET ORAL at 08:07

## 2017-07-31 RX ADMIN — GABAPENTIN 600 MG: 300 CAPSULE ORAL at 08:07

## 2017-07-31 RX ADMIN — GABAPENTIN 600 MG: 300 CAPSULE ORAL at 03:07

## 2017-07-31 RX ADMIN — PANTOPRAZOLE SODIUM 40 MG: 40 TABLET, DELAYED RELEASE ORAL at 08:07

## 2017-07-31 RX ADMIN — FUROSEMIDE 80 MG: 10 INJECTION, SOLUTION INTRAVENOUS at 08:07

## 2017-07-31 RX ADMIN — BUDESONIDE 0.5 MG: 0.25 SUSPENSION RESPIRATORY (INHALATION) at 08:07

## 2017-07-31 RX ADMIN — GUAIFENESIN 1200 MG: 600 TABLET, EXTENDED RELEASE ORAL at 08:07

## 2017-07-31 RX ADMIN — DIGOXIN 0.12 MG: 125 TABLET ORAL at 08:07

## 2017-07-31 RX ADMIN — HYDROXYZINE HYDROCHLORIDE 25 MG: 25 TABLET ORAL at 09:07

## 2017-07-31 RX ADMIN — LIDOCAINE 1 PATCH: 50 PATCH TOPICAL at 08:07

## 2017-07-31 RX ADMIN — WARFARIN SODIUM 5 MG: 5 TABLET ORAL at 05:07

## 2017-07-31 RX ADMIN — RANOLAZINE 500 MG: 500 TABLET, FILM COATED, EXTENDED RELEASE ORAL at 08:07

## 2017-07-31 RX ADMIN — GABAPENTIN 600 MG: 300 CAPSULE ORAL at 06:07

## 2017-07-31 RX ADMIN — FUROSEMIDE 10 MG/HR: 10 INJECTION, SOLUTION INTRAMUSCULAR; INTRAVENOUS at 03:07

## 2017-07-31 RX ADMIN — ASPIRIN 81 MG: 81 TABLET, COATED ORAL at 08:07

## 2017-07-31 RX ADMIN — ATORVASTATIN CALCIUM 80 MG: 20 TABLET, FILM COATED ORAL at 08:07

## 2017-07-31 RX ADMIN — INSULIN DETEMIR 15 UNITS: 100 INJECTION, SOLUTION SUBCUTANEOUS at 08:07

## 2017-07-31 NOTE — PLAN OF CARE
Problem: Diabetes, Type 2 (Adult)  Goal: Signs and Symptoms of Listed Potential Problems Will be Absent, Minimized or Managed (Diabetes, Type 2)  Signs and symptoms of listed potential problems will be absent, minimized or managed by discharge/transition of care (reference Diabetes, Type 2 (Adult) CPG).   Outcome: Ongoing (interventions implemented as appropriate)  Blood glucose monitored and corrected per sliding scale    Problem: Fall Risk (Adult)  Goal: Absence of Falls  Patient will demonstrate the desired outcomes by discharge/transition of care.   Outcome: Ongoing (interventions implemented as appropriate)  Patient remained free of falls through out shift

## 2017-07-31 NOTE — ASSESSMENT & PLAN NOTE
Patient seen by hepatology at [facility] and in discussion regarding treatment.  Appears that his lung disease is the ultimately limiting factor for treatment (his concerns regarding current treatments and effects on pulmonary status, as well as high resistance to of hepC strain to currently available regimens).  Patient scheduled to f/u with Hepatology August 2017 with Dr. Lopez.

## 2017-07-31 NOTE — ASSESSMENT & PLAN NOTE
- multifactorial with pulm HTN, RH failure, COPD, HATTIE, OHS  - Hypoxic at baseline w/ chronic supplemental O2 requirement (4-5 liters) and Trilogy at night, transferred for pulm-HTN eval.   - Continued guaifenesin, DuoNeb q6 and Budesonide q12.  - was on lasix 80 IV BID, will transition to lasix drip  - repreat ECHO ordered for 7/31  - pulmonology and HTS consulted, appreciate recs

## 2017-07-31 NOTE — PROGRESS NOTES
[facility] Hospital Medicine  Progress Note    Patient Name: Shaan Brown  MRN: [id number]  Patient Class: IP- Inpatient   Admission Date: 7/27/2017  Length of Stay: 4 days  Attending Physician: Donnie Rueda MD  Primary Care Provider: Mendel Ferguson Iii, MD    [facility] Medicine Team: [facility] 1 Riley Castellano MD    Subjective:     Principal Problem:Acute on chronic respiratory failure with hypoxia and hypercapnia    HPI:  Shaan Brown is a 62-year-old male w/ chronic mixed hypercapnia and hypoxemia 2/2 COPD, HFpEF, HATTIE and obesity hypoventilation who's on 4-5 liters of continuous supplemental O2 (SpO2 typically in the low 90s-80s)  w/ Trilogy at night. He presented to [facility] on 7/13 via EMS for worsening hypoxia and altered mental status. Initially admitted to the ICU. Worsening hypoxia was thought to be 2/2 to pulmonary HTN w/ PA pressures of 71. Bubble study was negative for shunt. Pt was also started on therapeutic Lovenox and was being bridged to warfarin for suspected PE, despite negative CTA. Finished abx treatment for RLL PNA. Diuretics were held 2/2 to hypotension. Pt has what looks to be a NSTEMI 2/2 to demand, however patient was not tolerate of beta-blocker therapy 2/2 to hypoxia. No improvement in hypoxia despite steroids. Transferred to [facility] for evaluation by pulmonology for pulmonary HTN.   On arrive, pt was hypoxic on 5 liters of supplemental O2. He was complaining of lower back pain.     Hospital Course:  Transferred from [facility] to [facility] IM-1 service for evaluation of worsening pulmonary HTN. He was continued on scheduled duonebs, budesonide full dose lovenox. Diuresis with lasix 40 IV BID was started. Pulmonology was consulted and recommended continued diuresis and aerosol treatments, heart transplant consult for PHtn, and stopping full dose AC. Heart transplant was consulted and recommended repeating the ECHO following  aggressive diuresis. Pt c/o L chest wall pain that he reports began after he fell onto an O2 container. On review of CTA from 7/24, pt has multiple fractured ribs on L side; PRN pain medication ordered. He also reported episodes of chest pain during this admission which were consistent with previous episodes of angina; workup not concerning for infarction.     Interval History: NAEON. Continues to diurese well, -8630 cc since admit. Breathing well on home oxygen. Still c/o L chest wall pain.    Review of Systems  Objective:     Vital Signs (Most Recent):  Temp: 98.4 °F (36.9 °C) (07/31/17 1210)  Pulse: 65 (07/31/17 1210)  Resp: 16 (07/31/17 1210)  BP: 101/63 (07/31/17 1210)  SpO2: (!) 85 % (07/31/17 1210) Vital Signs (24h Range):  Temp:  [97.7 °F (36.5 °C)-98.7 °F (37.1 °C)] 98.4 °F (36.9 °C)  Pulse:  [64-82] 65  Resp:  [15-20] 16  SpO2:  [84 %-96 %] 85 %  BP: (101-118)/(56-69) 101/63     Weight: 127.5 kg (281 lb 1.4 oz)  Body mass index is 39.2 kg/m².    Intake/Output Summary (Last 24 hours) at 07/31/17 1434  Last data filed at 07/31/17 0415   Gross per 24 hour   Intake              600 ml   Output             2340 ml   Net            -1740 ml      Physical Exam   Constitutional: He is oriented to person, place, and time. He appears well-developed and well-nourished. No distress.   Eyes: EOM are normal.   Neck: Neck supple.   Cardiovascular: Normal rate, regular rhythm, normal heart sounds and intact distal pulses.    Pulmonary/Chest: Effort normal. He has no wheezes. He exhibits tenderness (L chest wall).   Abdominal: Soft. Bowel sounds are normal. He exhibits distension. There is no tenderness.   Musculoskeletal: He exhibits edema (2+ LE edema bilaterally).   Neurological: He is alert and oriented to person, place, and time.   Skin: Skin is warm and dry. Capillary refill takes less than 2 seconds.       Significant Labs:   CBC:   Recent Labs  Lab 07/30/17  0511 07/31/17  0338   WBC 8.53 7.04   HGB 15.2 15.3   HCT  47.3 46.2    142*     CMP:   Recent Labs  Lab 07/30/17  0511 07/31/17  0338    139   K 3.7 3.7   CL 97 99   CO2 31* 30*   GLU 86 76   BUN 31* 23   CREATININE 1.3 0.9   CALCIUM 8.9 8.3*   PROT 6.1 5.8*   ALBUMIN 3.0* 2.8*   BILITOT 2.6* 3.2*   ALKPHOS 86 76   AST 33 44*   ALT 42 52*   ANIONGAP 11 10   EGFRNONAA 58.1* >60.0     Coagulation:   Recent Labs  Lab 07/31/17  0338   INR 1.5*     POCT Glucose:   Recent Labs  Lab 07/30/17  2031 07/31/17  0837 07/31/17  1148   POCTGLUCOSE 117* 81 126*     All pertinent labs within the past 24 hours have been reviewed.    Significant Imaging: I have reviewed all pertinent imaging results/findings within the past 24 hours.    Assessment/Plan:      * Acute on chronic respiratory failure with hypoxia and hypercapnia    - multifactorial with pulm HTN, RH failure, COPD, HATTIE, OHS  - Hypoxic at baseline w/ chronic supplemental O2 requirement (4-5 liters) and Trilogy at night, transferred for pulm-HTN eval.   - Continued guaifenesin, DuoNeb q6 and Budesonide q12.  - was on lasix 80 IV BID, will transition to lasix drip  - repreat ECHO ordered for 7/31  - pulmonology and HTS consulted, appreciate recs        CAD (coronary artery disease)    - Troponemia prior to transfer thought to be 2/2 to demand ischemia. Currently on ranolazine, ASA and atorvastatin.   - pt reports stent placement in 5/2016. Will obtain outside records to determine the need for continuing plavix  - has had chest pain during this admission c/w previous angina; SL nitroglycerin PRN        Chronic obstructive pulmonary disease with acute exacerbation    - Treated w/ high dose steroids w/o improvement, no indication for further steroids. Continued DuoNeb and budesonide.         Paroxysmal atrial fibrillation    - Started on warfarin. Daily INR  - Continue digoxin and amiodarone. Patient on telemetry.         Type 2 diabetes mellitus with hyperglycemia    - decreased Detemir to 15 units BID w/ SSI .          Peripheral vascular disease    - continuing ASA and pletal        HATTIE on CPAP    BiPAP QHS.        Morbid obesity with alveolar hypoventilation    BiPAP QHS.         Chronic hepatitis C    Patient seen by hepatology at [facility] [place] and in discussion regarding treatment.  Appears that his lung disease is the ultimately limiting factor for treatment (his concerns regarding current treatments and effects on pulmonary status, as well as high resistance to of hepC strain to currently available regimens).  Patient scheduled to f/u with Hepatology August 2017 with Dr. Lopez.         Rib fractures    - pt with multiple L rib fractures which can be seen on CTA 7/24; pt reports falling on O2 tank  - percocet 10 and dilaudid 0.5 PRN pain        Demand ischemia    ASA, statin and Imdur.             VTE Risk Mitigation         Ordered     warfarin (COUMADIN) tablet 5 mg  Daily     Route:  Oral        07/29/17 1127     enoxaparin injection 40 mg  Every 12 hours     Route:  Subcutaneous        07/28/17 2376          Riley Castellano MD  Department of Hospital Medicine   [facility]

## 2017-07-31 NOTE — PROGRESS NOTES
LOS: 4 days     24h events and S:  Shaan Brown is a 62 y.o. male who endorses SOB but denies CP.     O:  Vitals:  Temp: 97.7 °F (36.5 °C) (07/31/17 0826)  Pulse: 64 (07/31/17 0859)  Resp: 18 (07/31/17 0859)  BP: 105/69 (07/31/17 0826)  SpO2: (!) 88 % (07/31/17 0859)    Ins/Outs:    Intake/Output Summary (Last 24 hours) at 07/31/17 0941  Last data filed at 07/31/17 0415   Gross per 24 hour   Intake              840 ml   Output             3490 ml   Net            -2650 ml       Physical Exam:  General: alert and oriented, conversant  Heart: RRR, no r/g appreciated  Lungs: CTAB  Abdomen: soft, NT, ND, +BS  Vascular: 2+ radial pulse, 2+ edema    Medications:   albuterol-ipratropium 2.5mg-0.5mg/3mL  3 mL Nebulization Q6H WAKE    aspirin  81 mg Oral Daily    atorvastatin  80 mg Oral Daily    budesonide  0.5 mg Nebulization Q12H    digoxin  0.125 mg Oral Daily    enoxaparin  40 mg Subcutaneous Q12H    fluticasone  2 spray Each Nare Daily    furosemide  80 mg Intravenous BID    gabapentin  600 mg Oral TID    guaifenesin  1,200 mg Oral BID    insulin detemir  15 Units Subcutaneous BID    isosorbide mononitrate  30 mg Oral Daily    lidocaine  1 patch Transdermal Q24H    magnesium oxide  400 mg Oral BID    pantoprazole  40 mg Oral Daily    polyethylene glycol  17 g Oral Daily    ranolazine  500 mg Oral BID    tamsulosin  0.4 mg Oral Daily    warfarin  5 mg Oral Daily        acetaminophen, dextrose 50%, dextrose 50%, glucagon (human recombinant), glucose, glucose, HYDROmorphone, hydrOXYzine HCl, insulin aspart, nitroGLYCERIN, oxycodone-acetaminophen, ramelteon, sodium chloride    Review of patient's allergies indicates:  No Known Allergies    Labs:  CBC with Diff:     Recent Labs  Lab 07/29/17  0409 07/30/17  0511 07/31/17  0338   WBC 7.77 8.53 7.04   HGB 15.0 15.2 15.3   HCT 46.1 47.3 46.2    179 142*   LYMPH 30.1  2.3 29.0  2.5 28.0  2.0   MONO 9.3  0.7 9.6  0.8 9.8  0.7   EOSINOPHIL 1.2 2.0  2.6       COAG:    Recent Labs  Lab 07/29/17  0409 07/30/17  0511 07/31/17  0338   INR 1.4* 1.3* 1.5*       CMP:   Recent Labs  Lab 07/28/17  0106  07/29/17  0409 07/30/17  0511 07/31/17  0338   *  < > 88 86 76   CALCIUM 9.5  < > 9.0 8.9 8.3*   ALBUMIN 3.0*  < > 3.0* 3.0* 2.8*   PROT 6.7  < > 6.2 6.1 5.8*     < > 143 139 139   K 4.1  < > 3.8 3.7 3.7   CO2 29  < > 28 31* 30*     < > 101 97 99   BUN 26*  < > 29* 31* 23   CREATININE 1.2  < > 1.2 1.3 0.9   ALKPHOS 86  < > 92 86 76   ALT 26  < > 29 42 52*   AST 16  < > 24 33 44*   BILITOT 1.6*  < > 1.6* 2.6* 3.2*   MG 2.3  --   --  2.0 2.0   < > = values in this interval not displayed.  Estimated Creatinine Clearance: 114.3 mL/min (based on Cr of 0.9).    .  Recent Labs  Lab 07/28/17  1527 07/30/17  0053   TROPONINI  --  0.027*   *  --          Diagnostic Results:  Ejection Fractions   EF   Date Value Ref Range Status   03/13/2015 65 55 - 65         Infectious disease labs:  Microbiology Results (last 7 days)     ** No results found for the last 168 hours. **          Assessment and Plan:  Shaan Brown is a 62 y.o. gentleman with PMH of HFpEF (EF 50%), COPD on home O2 (4-5L NC), morbid obesity, obesity hypoventilation syndrome (OHS), CAD s/p CABG (2000), s/p PCI (05/2016) on ASA/Plavix, PAD s/p PTCA in 2000 on ASA/pletal, paroxysmal AF s/p DCCV (05/2016), hx of cardiac arrest s/p CPR (2016), Hepatitis C (not on treatment due to lung disease limiting his life expectancy more than hep. C) who presented to [facility] as a transfer from [facility] for management of acute on chronic respiratory failure / pulmonary HTN management.     Pulmonary HTN - multifactorial - WHO II/III (ADHF/COPD)  - recommend treating CHF and COPD; no role for PHT-specific therapy aside from treating underlying etiologies  - management of COPD/HATTIE/OHS as per pulm recs  - daily STANDING weights, strict I/Os, 2 gm Na diet, 1500 mL fluid restriction  - will f/u TTE w/ CFD   -  monitor renal function daily, keep K>4.0, Mg>2.0  - CTA chest negative for PE  - known hx of COPD/HATTIE based on prior spirometry/PSG  - recommend IVP Lasix 80 mg x1 followed by 10 mg/h     CAD s/p CABG (2000), s/p PCI (05/2016)  - c/w ASA and Plavix     PAD   - c/w ASA, Pletal     Paroxysmal AF  - [id number] - 4  - c/w amiodarone  - INR subtherapeutic - will need therapeutic AC - given use of ASA, Plavix, Pletal, and Warfarin - would defer holding the plavix to the patient's primary cardiologist given >1 year since PCI    Prasad Wood MD

## 2017-07-31 NOTE — PLAN OF CARE
Problem: Fall Risk (Adult)  Goal: Identify Related Risk Factors and Signs and Symptoms  Related risk factors and signs and symptoms are identified upon initiation of Human Response Clinical Practice Guideline (CPG)   Pt is on continuous oxygen, pt receives iv laxis and voids frequently, pt complains of pain intermittently, pt has had a recent fall at home which led to hospitalization. Pt and wife are to encouraged to call for any needs, call bell in reach, frequent rounding to prevent falls.

## 2017-07-31 NOTE — SUBJECTIVE & OBJECTIVE
Interval History: TIP. Continues to diurese well, -8630 cc since admit. Breathing well on home oxygen. Still c/o L chest wall pain.    Review of Systems  Objective:     Vital Signs (Most Recent):  Temp: 98.4 °F (36.9 °C) (07/31/17 1210)  Pulse: 65 (07/31/17 1210)  Resp: 16 (07/31/17 1210)  BP: 101/63 (07/31/17 1210)  SpO2: (!) 85 % (07/31/17 1210) Vital Signs (24h Range):  Temp:  [97.7 °F (36.5 °C)-98.7 °F (37.1 °C)] 98.4 °F (36.9 °C)  Pulse:  [64-82] 65  Resp:  [15-20] 16  SpO2:  [84 %-96 %] 85 %  BP: (101-118)/(56-69) 101/63     Weight: 127.5 kg (281 lb 1.4 oz)  Body mass index is 39.2 kg/m².    Intake/Output Summary (Last 24 hours) at 07/31/17 1434  Last data filed at 07/31/17 0415   Gross per 24 hour   Intake              600 ml   Output             2340 ml   Net            -1740 ml      Physical Exam   Constitutional: He is oriented to person, place, and time. He appears well-developed and well-nourished. No distress.   Eyes: EOM are normal.   Neck: Neck supple.   Cardiovascular: Normal rate, regular rhythm, normal heart sounds and intact distal pulses.    Pulmonary/Chest: Effort normal. He has no wheezes. He exhibits tenderness (L chest wall).   Abdominal: Soft. Bowel sounds are normal. He exhibits distension. There is no tenderness.   Musculoskeletal: He exhibits edema (2+ LE edema bilaterally).   Neurological: He is alert and oriented to person, place, and time.   Skin: Skin is warm and dry. Capillary refill takes less than 2 seconds.       Significant Labs:   CBC:   Recent Labs  Lab 07/30/17  0511 07/31/17 0338   WBC 8.53 7.04   HGB 15.2 15.3   HCT 47.3 46.2    142*     CMP:   Recent Labs  Lab 07/30/17 0511 07/31/17 0338    139   K 3.7 3.7   CL 97 99   CO2 31* 30*   GLU 86 76   BUN 31* 23   CREATININE 1.3 0.9   CALCIUM 8.9 8.3*   PROT 6.1 5.8*   ALBUMIN 3.0* 2.8*   BILITOT 2.6* 3.2*   ALKPHOS 86 76   AST 33 44*   ALT 42 52*   ANIONGAP 11 10   EGFRNONAA 58.1* >60.0     Coagulation:   Recent  Labs  Lab 07/31/17  0338   INR 1.5*     POCT Glucose:   Recent Labs  Lab 07/30/17  2031 07/31/17  0837 07/31/17  1148   POCTGLUCOSE 117* 81 126*     All pertinent labs within the past 24 hours have been reviewed.    Significant Imaging: I have reviewed all pertinent imaging results/findings within the past 24 hours.

## 2017-07-31 NOTE — PHARMACY MED REC
"Admission Medication Reconciliation - Pharmacy Consult Note    The home medication history was taken by Akanksha Truong Pharmacy Tech. Based on information gathered and subsequent review by the clinical pharmacist, the items below may need attention.     You may go to "Admission" then "Reconcile Home Medications" tabs to review and/or act upon these items. Based on information gathered and subsequent review by the clinical pharmacist, the items below may need attention.    Potentially problematic discrepancies with current MAR  o Patient IS taking the following which was not ordered upon admit  o Advair 250-50 mcg/dose 1 puff into lungs BID (formulary equivalent is fluticasone-vilanterol 100-25 mcg 1 puff daily)  o Tiotropium 18 mcg inhaled daily    Marcie Martinez PharmD  [id number]      Patient's prior to admission medication regimen was as follows:  Medication Sig    ADVAIR DISKUS 250-50 mcg/dose diskus inhaler Inhale 1 puff into the lungs 2 (two) times daily.    albuterol (PROAIR HFA) 90 mcg/actuation inhaler Inhale 1 puff into the lungs 2 (two) times daily. Rescue    albuterol (PROVENTIL) 5 mg/mL nebulizer solution Take 2.5 mg by nebulization 3 (three) times daily. Rescue    alprazolam (XANAX) 1 MG tablet Take 1 mg by mouth 2 (two) times daily as needed for Anxiety.     amiodarone (PACERONE) 100 MG Tab Take 1 tablet (100 mg total) by mouth once daily.    aspirin 81 MG Chew Take 81 mg by mouth once daily.    atorvastatin (LIPITOR) 40 MG tablet Take 2 tablets (80 mg total) by mouth once daily. (Patient taking differently: Take 40 mg by mouth 2 (two) times daily. )    carvedilol (COREG) 3.125 MG tablet Take 3.125 mg by mouth once daily.     cholecalciferol, vitamin D3, (VITAMIN D3) 400 unit Chew Take 400 Units by mouth once daily.    cilostazol (PLETAL) 100 MG Tab Take 100 mg by mouth once daily.     clopidogrel (PLAVIX) 75 mg tablet Take 75 mg by mouth once daily.     fish oil-omega-3 fatty acids 300-1,000 " mg capsule Take 1 capsule by mouth 2 (two) times daily.     furosemide (LASIX) 40 MG tablet Take 1 tablet by mouth 2 (two) times daily.    gabapentin (NEURONTIN) 300 MG capsule Take 3 capsules (900 mg total) by mouth every evening.    hydrocodone-acetaminophen 10-325mg (NORCO)  mg Tab Take 1 tablet by mouth every 6 (six) hours as needed for Pain.     insulin detemir (LEVEMIR) 100 unit/mL injection Inject 15 Units into the skin 2 (two) times daily.    isosorbide mononitrate (IMDUR) 30 MG 24 hr tablet Take 1 tablet (30 mg total) by mouth once daily.    pantoprazole (PROTONIX) 40 MG tablet Take 40 mg by mouth 2 (two) times daily.     potassium chloride SA (K-DUR,KLOR-CON) 10 MEQ tablet Take 10 mEq by mouth once daily.    ranolazine (RANEXA) 500 MG Tb12 Take 1 tablet (500 mg total) by mouth 2 (two) times daily.    SPIRIVA RESPIMAT 2.5 mcg/actuation Mist Inhale 2 puffs into the lungs once daily at 6am.    spironolactone (ALDACTONE) 25 MG tablet Take 1 tablet (25 mg total) by mouth once daily.    tamsulosin (FLOMAX) 0.4 mg Cp24 Take 1 capsule (0.4 mg total) by mouth once daily.    sucralfate (CARAFATE) 1 gram tablet Take 1 tablet by mouth 4 (four) times daily before meals and nightly.    tizanidine (ZANAFLEX) 4 MG tablet Take 4 mg by mouth every evening.          Please add appropriate    SmartPhrase below:

## 2017-08-01 LAB
ALBUMIN SERPL BCP-MCNC: 2.9 G/DL
ALP SERPL-CCNC: 78 U/L
ALT SERPL W/O P-5'-P-CCNC: 55 U/L
ANION GAP SERPL CALC-SCNC: 10 MMOL/L
AST SERPL-CCNC: 39 U/L
BASOPHILS # BLD AUTO: 0.02 K/UL
BASOPHILS NFR BLD: 0.3 %
BILIRUB SERPL-MCNC: 3.1 MG/DL
BUN SERPL-MCNC: 18 MG/DL
CALCIUM SERPL-MCNC: 8.3 MG/DL
CHLORIDE SERPL-SCNC: 99 MMOL/L
CO2 SERPL-SCNC: 30 MMOL/L
CREAT SERPL-MCNC: 1 MG/DL
DIFFERENTIAL METHOD: ABNORMAL
EOSINOPHIL # BLD AUTO: 0.1 K/UL
EOSINOPHIL NFR BLD: 2.1 %
ERYTHROCYTE [DISTWIDTH] IN BLOOD BY AUTOMATED COUNT: 20.6 %
EST. GFR  (AFRICAN AMERICAN): >60 ML/MIN/1.73 M^2
EST. GFR  (NON AFRICAN AMERICAN): >60 ML/MIN/1.73 M^2
GLUCOSE SERPL-MCNC: 93 MG/DL
HCT VFR BLD AUTO: 45.3 %
HGB BLD-MCNC: 15.1 G/DL
INR PPP: 2
LYMPHOCYTES # BLD AUTO: 1.9 K/UL
LYMPHOCYTES NFR BLD: 29.3 %
MAGNESIUM SERPL-MCNC: 1.8 MG/DL
MCH RBC QN AUTO: 30.1 PG
MCHC RBC AUTO-ENTMCNC: 33.3 G/DL
MCV RBC AUTO: 90 FL
MONOCYTES # BLD AUTO: 0.7 K/UL
MONOCYTES NFR BLD: 10.1 %
NEUTROPHILS # BLD AUTO: 3.8 K/UL
NEUTROPHILS NFR BLD: 57.6 %
PLATELET # BLD AUTO: 139 K/UL
PMV BLD AUTO: 9.2 FL
POCT GLUCOSE: 106 MG/DL (ref 70–110)
POCT GLUCOSE: 112 MG/DL (ref 70–110)
POCT GLUCOSE: 126 MG/DL (ref 70–110)
POCT GLUCOSE: 86 MG/DL (ref 70–110)
POTASSIUM SERPL-SCNC: 3.4 MMOL/L
PROT SERPL-MCNC: 5.9 G/DL
PROTHROMBIN TIME: 20.2 SEC
RBC # BLD AUTO: 5.01 M/UL
SODIUM SERPL-SCNC: 139 MMOL/L
WBC # BLD AUTO: 6.55 K/UL

## 2017-08-01 PROCEDURE — 99900035 HC TECH TIME PER 15 MIN (STAT)

## 2017-08-01 PROCEDURE — 25000003 PHARM REV CODE 250: Performed by: INTERNAL MEDICINE

## 2017-08-01 PROCEDURE — 80053 COMPREHEN METABOLIC PANEL: CPT

## 2017-08-01 PROCEDURE — 27000173 HC ACAPELLA DEVICE DH OR DM

## 2017-08-01 PROCEDURE — 94640 AIRWAY INHALATION TREATMENT: CPT

## 2017-08-01 PROCEDURE — 25000003 PHARM REV CODE 250: Performed by: STUDENT IN AN ORGANIZED HEALTH CARE EDUCATION/TRAINING PROGRAM

## 2017-08-01 PROCEDURE — 11000001 HC ACUTE MED/SURG PRIVATE ROOM

## 2017-08-01 PROCEDURE — 36415 COLL VENOUS BLD VENIPUNCTURE: CPT

## 2017-08-01 PROCEDURE — 63600175 PHARM REV CODE 636 W HCPCS: Performed by: STUDENT IN AN ORGANIZED HEALTH CARE EDUCATION/TRAINING PROGRAM

## 2017-08-01 PROCEDURE — 85610 PROTHROMBIN TIME: CPT

## 2017-08-01 PROCEDURE — 27000221 HC OXYGEN, UP TO 24 HOURS

## 2017-08-01 PROCEDURE — 83735 ASSAY OF MAGNESIUM: CPT

## 2017-08-01 PROCEDURE — 25000242 PHARM REV CODE 250 ALT 637 W/ HCPCS: Performed by: INTERNAL MEDICINE

## 2017-08-01 PROCEDURE — 25000003 PHARM REV CODE 250: Performed by: HOSPITALIST

## 2017-08-01 PROCEDURE — 94664 DEMO&/EVAL PT USE INHALER: CPT

## 2017-08-01 PROCEDURE — 85025 COMPLETE CBC W/AUTO DIFF WBC: CPT

## 2017-08-01 PROCEDURE — 99232 SBSQ HOSP IP/OBS MODERATE 35: CPT | Mod: GC,,, | Performed by: HOSPITALIST

## 2017-08-01 RX ORDER — BUDESONIDE 0.5 MG/2ML
0.5 INHALANT ORAL ONCE
Status: DISCONTINUED | OUTPATIENT
Start: 2017-08-01 | End: 2017-08-02

## 2017-08-01 RX ORDER — POTASSIUM CHLORIDE 20 MEQ/1
40 TABLET, EXTENDED RELEASE ORAL ONCE
Status: COMPLETED | OUTPATIENT
Start: 2017-08-01 | End: 2017-08-01

## 2017-08-01 RX ORDER — HYDROXYZINE HYDROCHLORIDE 25 MG/1
25 TABLET, FILM COATED ORAL 2 TIMES DAILY PRN
Status: DISCONTINUED | OUTPATIENT
Start: 2017-08-01 | End: 2017-08-05

## 2017-08-01 RX ORDER — ALPRAZOLAM 1 MG/1
1 TABLET ORAL 2 TIMES DAILY PRN
Status: DISCONTINUED | OUTPATIENT
Start: 2017-08-01 | End: 2017-08-09 | Stop reason: HOSPADM

## 2017-08-01 RX ORDER — BUDESONIDE 0.5 MG/2ML
0.5 INHALANT ORAL EVERY 12 HOURS
Status: DISCONTINUED | OUTPATIENT
Start: 2017-08-02 | End: 2017-08-02

## 2017-08-01 RX ORDER — WARFARIN 2 MG/1
4 TABLET ORAL DAILY
Status: DISCONTINUED | OUTPATIENT
Start: 2017-08-01 | End: 2017-08-02

## 2017-08-01 RX ADMIN — HYDROMORPHONE HYDROCHLORIDE 0.5 MG: 1 INJECTION, SOLUTION INTRAMUSCULAR; INTRAVENOUS; SUBCUTANEOUS at 09:08

## 2017-08-01 RX ADMIN — INSULIN DETEMIR 15 UNITS: 100 INJECTION, SOLUTION SUBCUTANEOUS at 09:08

## 2017-08-01 RX ADMIN — POTASSIUM CHLORIDE 40 MEQ: 1500 TABLET, EXTENDED RELEASE ORAL at 08:08

## 2017-08-01 RX ADMIN — ISOSORBIDE MONONITRATE 30 MG: 30 TABLET ORAL at 08:08

## 2017-08-01 RX ADMIN — IPRATROPIUM BROMIDE AND ALBUTEROL SULFATE 3 ML: .5; 3 SOLUTION RESPIRATORY (INHALATION) at 12:08

## 2017-08-01 RX ADMIN — WARFARIN SODIUM 4 MG: 2 TABLET ORAL at 05:08

## 2017-08-01 RX ADMIN — FUROSEMIDE 10 MG/HR: 10 INJECTION, SOLUTION INTRAMUSCULAR; INTRAVENOUS at 10:08

## 2017-08-01 RX ADMIN — GABAPENTIN 600 MG: 300 CAPSULE ORAL at 09:08

## 2017-08-01 RX ADMIN — ATORVASTATIN CALCIUM 80 MG: 20 TABLET, FILM COATED ORAL at 08:08

## 2017-08-01 RX ADMIN — TAMSULOSIN HYDROCHLORIDE 0.4 MG: 0.4 CAPSULE ORAL at 08:08

## 2017-08-01 RX ADMIN — HYDROMORPHONE HYDROCHLORIDE 0.5 MG: 1 INJECTION, SOLUTION INTRAMUSCULAR; INTRAVENOUS; SUBCUTANEOUS at 05:08

## 2017-08-01 RX ADMIN — MAGNESIUM OXIDE TAB 400 MG (241.3 MG ELEMENTAL MG) 400 MG: 400 (241.3 MG) TAB at 08:08

## 2017-08-01 RX ADMIN — IPRATROPIUM BROMIDE AND ALBUTEROL SULFATE 3 ML: .5; 3 SOLUTION RESPIRATORY (INHALATION) at 07:08

## 2017-08-01 RX ADMIN — HYDROMORPHONE HYDROCHLORIDE 0.5 MG: 1 INJECTION, SOLUTION INTRAMUSCULAR; INTRAVENOUS; SUBCUTANEOUS at 04:08

## 2017-08-01 RX ADMIN — OXYCODONE HYDROCHLORIDE AND ACETAMINOPHEN 1 TABLET: 10; 325 TABLET ORAL at 01:08

## 2017-08-01 RX ADMIN — LIDOCAINE 1 PATCH: 50 PATCH TOPICAL at 09:08

## 2017-08-01 RX ADMIN — HYDROXYZINE HYDROCHLORIDE 25 MG: 25 TABLET ORAL at 01:08

## 2017-08-01 RX ADMIN — RANOLAZINE 500 MG: 500 TABLET, FILM COATED, EXTENDED RELEASE ORAL at 09:08

## 2017-08-01 RX ADMIN — DIGOXIN 0.12 MG: 125 TABLET ORAL at 08:08

## 2017-08-01 RX ADMIN — PANTOPRAZOLE SODIUM 40 MG: 40 TABLET, DELAYED RELEASE ORAL at 08:08

## 2017-08-01 RX ADMIN — ALPRAZOLAM 1 MG: 1 TABLET ORAL at 10:08

## 2017-08-01 RX ADMIN — GABAPENTIN 600 MG: 300 CAPSULE ORAL at 01:08

## 2017-08-01 RX ADMIN — RANOLAZINE 500 MG: 500 TABLET, FILM COATED, EXTENDED RELEASE ORAL at 08:08

## 2017-08-01 RX ADMIN — MAGNESIUM OXIDE TAB 400 MG (241.3 MG ELEMENTAL MG) 400 MG: 400 (241.3 MG) TAB at 09:08

## 2017-08-01 RX ADMIN — GABAPENTIN 600 MG: 300 CAPSULE ORAL at 05:08

## 2017-08-01 RX ADMIN — GUAIFENESIN 1200 MG: 600 TABLET, EXTENDED RELEASE ORAL at 08:08

## 2017-08-01 RX ADMIN — ASPIRIN 81 MG: 81 TABLET, COATED ORAL at 08:08

## 2017-08-01 RX ADMIN — GUAIFENESIN 1200 MG: 600 TABLET, EXTENDED RELEASE ORAL at 09:08

## 2017-08-01 RX ADMIN — IPRATROPIUM BROMIDE AND ALBUTEROL SULFATE 3 ML: .5; 3 SOLUTION RESPIRATORY (INHALATION) at 08:08

## 2017-08-01 NOTE — ASSESSMENT & PLAN NOTE
- multifactorial with pulm HTN, RH failure, COPD, HATTIE, OHS  - Hypoxic at baseline w/ chronic supplemental O2 requirement (4-5 liters) and Trilogy at night, transferred for pulm-HTN eval.   - Continued guaifenesin, DuoNeb q6 and Budesonide q12.  - lasix IVP switched to lasix gtt  - repeat ECHO showed normal EF and elevated estimated PASP, but was unable to accurately determine PA pressure due to inadequate TR jet.  - pulmonology and HTS consulted, appreciate recs  - plan for PFTs and RHC when euvolemic

## 2017-08-01 NOTE — SUBJECTIVE & OBJECTIVE
Interval History: NAEON. Pt seen by HTS yesterday, recommended switching lasix IVP to lasix gtt to improve diuresis. ECHO yesterday showed normal EF and elevated estimated PASP, but was unable to accurately determine PA pressure due to inadequate TR jet. Pt continues to report L sided chest wall pain.    Review of Systems  Objective:     Vital Signs (Most Recent):  Temp: 97.4 °F (36.3 °C) (08/01/17 1127)  Pulse: 80 (08/01/17 1253)  Resp: 18 (08/01/17 1253)  BP: (!) 90/53 (08/01/17 1127)  SpO2: (!) 92 % (08/01/17 1253) Vital Signs (24h Range):  Temp:  [97.1 °F (36.2 °C)-98.5 °F (36.9 °C)] 97.4 °F (36.3 °C)  Pulse:  [67-98] 80  Resp:  [18-22] 18  SpO2:  [84 %-92 %] 92 %  BP: ()/(53-72) 90/53     Weight: 123.4 kg (272 lb)  Body mass index is 37.94 kg/m².    Intake/Output Summary (Last 24 hours) at 08/01/17 1535  Last data filed at 08/01/17 0500   Gross per 24 hour   Intake              150 ml   Output              775 ml   Net             -625 ml      Physical Exam   Constitutional: He is oriented to person, place, and time. He appears well-developed and well-nourished. No distress.   Eyes: EOM are normal.   Neck: Neck supple.   Cardiovascular: Normal rate, regular rhythm, normal heart sounds and intact distal pulses.    Pulmonary/Chest: Effort normal. He has no wheezes. He exhibits tenderness (L chest wall).   Abdominal: Soft. Bowel sounds are normal. He exhibits distension. There is no tenderness.   Musculoskeletal: He exhibits edema (2+ LE edema bilaterally).   Neurological: He is alert and oriented to person, place, and time.   Skin: Skin is warm and dry. Capillary refill takes less than 2 seconds.        Significant Labs:   CBC:   Recent Labs  Lab 07/31/17  0338 08/01/17  0524   WBC 7.04 6.55   HGB 15.3 15.1   HCT 46.2 45.3   * 139*     CMP:   Recent Labs  Lab 07/31/17  0338 08/01/17  0524    139   K 3.7 3.4*   CL 99 99   CO2 30* 30*   GLU 76 93   BUN 23 18   CREATININE 0.9 1.0   CALCIUM 8.3*  8.3*   PROT 5.8* 5.9*   ALBUMIN 2.8* 2.9*   BILITOT 3.2* 3.1*   ALKPHOS 76 78   AST 44* 39   ALT 52* 55*   ANIONGAP 10 10   EGFRNONAA >60.0 >60.0     Coagulation:   Recent Labs  Lab 08/01/17  0524   INR 2.0*     POCT Glucose:   Recent Labs  Lab 07/31/17  2158 08/01/17  0824 08/01/17  1221   POCTGLUCOSE 163* 112* 106     All pertinent labs within the past 24 hours have been reviewed.    Significant Imaging: I have reviewed all pertinent imaging results/findings within the past 24 hours.

## 2017-08-01 NOTE — ASSESSMENT & PLAN NOTE
- Started on warfarin. Daily INR. Will adjust dose as necessary  - Continue digoxin and amiodarone. Patient on telemetry.

## 2017-08-01 NOTE — PROGRESS NOTES
[facility] Hospital Medicine  Progress Note    Patient Name: Shaan Brown  MRN: [id number]  Patient Class: IP- Inpatient   Admission Date: 7/27/2017  Length of Stay: 5 days  Attending Physician: Donnie Rueda MD  Primary Care Provider: Mendel Ferguson Iii, MD    [facility] Medicine Team: [facility]OSP MED 1 Riley Castellano MD    Subjective:     Principal Problem:Acute on chronic respiratory failure with hypoxia and hypercapnia    HPI:  Shaan Brown is a 62-year-old male w/ chronic mixed hypercapnia and hypoxemia 2/2 COPD, HFpEF, HATTIE and obesity hypoventilation who's on 4-5 liters of continuous supplemental O2 (SpO2 typically in the low 90s-80s)  w/ Trilogy at night. He presented to [facility] on 7/13 via EMS for worsening hypoxia and altered mental status. Initially admitted to the ICU. Worsening hypoxia was thought to be 2/2 to pulmonary HTN w/ PA pressures of 71. Bubble study was negative for shunt. Pt was also started on therapeutic Lovenox and was being bridged to warfarin for suspected PE, despite negative CTA. Finished abx treatment for RLL PNA. Diuretics were held 2/2 to hypotension. Pt has what looks to be a NSTEMI 2/2 to demand, however patient was not tolerate of beta-blocker therapy 2/2 to hypoxia. No improvement in hypoxia despite steroids. Transferred to [facility] for evaluation by pulmonology for pulmonary HTN.   On arrive, pt was hypoxic on 5 liters of supplemental O2. He was complaining of lower back pain.     Hospital Course:  Transferred from [facility] to [facility] IM-1 service for evaluation of worsening pulmonary HTN. He was continued on scheduled duonebs, budesonide full dose lovenox. Diuresis with lasix 40 IV BID was started. Pulmonology was consulted and recommended continued diuresis and aerosol treatments, heart transplant consult for PHtn, and stopping full dose AC. Heart transplant was consulted and recommended repeating the ECHO following  aggressive diuresis. Pt c/o L chest wall pain that he reports began after he fell onto an O2 container. On review of CTA from 7/24, pt has multiple fractured ribs on L side; PRN pain medication ordered. He also reported episodes of chest pain during this admission which were consistent with previous episodes of angina; workup not concerning for infarction. Lasix IVP switched to lasix gtt to improve diuresis. ECHO 7/31 showed normal EF and elevated estimated PASP, but was unable to accurately determine PA pressure due to inadequate TR jet.    Interval History: NAEON. Pt seen by HTS yesterday, recommended switching lasix IVP to lasix gtt to improve diuresis. ECHO yesterday showed normal EF and elevated estimated PASP, but was unable to accurately determine PA pressure due to inadequate TR jet. Pt continues to report L sided chest wall pain.    Review of Systems  Objective:     Vital Signs (Most Recent):  Temp: 97.4 °F (36.3 °C) (08/01/17 1127)  Pulse: 80 (08/01/17 1253)  Resp: 18 (08/01/17 1253)  BP: (!) 90/53 (08/01/17 1127)  SpO2: (!) 92 % (08/01/17 1253) Vital Signs (24h Range):  Temp:  [97.1 °F (36.2 °C)-98.5 °F (36.9 °C)] 97.4 °F (36.3 °C)  Pulse:  [67-98] 80  Resp:  [18-22] 18  SpO2:  [84 %-92 %] 92 %  BP: ()/(53-72) 90/53     Weight: 123.4 kg (272 lb)  Body mass index is 37.94 kg/m².    Intake/Output Summary (Last 24 hours) at 08/01/17 1535  Last data filed at 08/01/17 0500   Gross per 24 hour   Intake              150 ml   Output              775 ml   Net             -625 ml      Physical Exam   Constitutional: He is oriented to person, place, and time. He appears well-developed and well-nourished. No distress.   Eyes: EOM are normal.   Neck: Neck supple.   Cardiovascular: Normal rate, regular rhythm, normal heart sounds and intact distal pulses.    Pulmonary/Chest: Effort normal. He has no wheezes. He exhibits tenderness (L chest wall).   Abdominal: Soft. Bowel sounds are normal. He exhibits distension.  There is no tenderness.   Musculoskeletal: He exhibits edema (2+ LE edema bilaterally).   Neurological: He is alert and oriented to person, place, and time.   Skin: Skin is warm and dry. Capillary refill takes less than 2 seconds.        Significant Labs:   CBC:   Recent Labs  Lab 07/31/17 0338 08/01/17  0524   WBC 7.04 6.55   HGB 15.3 15.1   HCT 46.2 45.3   * 139*     CMP:   Recent Labs  Lab 07/31/17 0338 08/01/17  0524    139   K 3.7 3.4*   CL 99 99   CO2 30* 30*   GLU 76 93   BUN 23 18   CREATININE 0.9 1.0   CALCIUM 8.3* 8.3*   PROT 5.8* 5.9*   ALBUMIN 2.8* 2.9*   BILITOT 3.2* 3.1*   ALKPHOS 76 78   AST 44* 39   ALT 52* 55*   ANIONGAP 10 10   EGFRNONAA >60.0 >60.0     Coagulation:   Recent Labs  Lab 08/01/17 0524   INR 2.0*     POCT Glucose:   Recent Labs  Lab 07/31/17  2158 08/01/17  0824 08/01/17  1221   POCTGLUCOSE 163* 112* 106     All pertinent labs within the past 24 hours have been reviewed.    Significant Imaging: I have reviewed all pertinent imaging results/findings within the past 24 hours.    Assessment/Plan:      * Acute on chronic respiratory failure with hypoxia and hypercapnia    - multifactorial with pulm HTN, RH failure, COPD, HATTIE, OHS  - Hypoxic at baseline w/ chronic supplemental O2 requirement (4-5 liters) and Trilogy at night, transferred for pulm-HTN eval.   - Continued guaifenesin, DuoNeb q6 and Budesonide q12.  - lasix IVP switched to lasix gtt  - repeat ECHO showed normal EF and elevated estimated PASP, but was unable to accurately determine PA pressure due to inadequate TR jet.  - pulmonology and HTS consulted, appreciate recs  - plan for PFTs and RHC when euvolemic        Coronary artery disease involving native coronary artery of native heart without angina pectoris    - Troponemia prior to transfer thought to be 2/2 to demand ischemia. Currently on ranolazine, ASA and atorvastatin.   - pt reports stent placement in 5/2016. Will obtain outside records to determine the  need for continuing plavix  - has had chest pain during this admission c/w previous angina; SL nitroglycerin PRN        Chronic obstructive pulmonary disease with acute exacerbation    - Treated w/ high dose steroids w/o improvement, no indication for further steroids. Continued DuoNeb and budesonide.         Paroxysmal atrial fibrillation    - Started on warfarin. Daily INR. Will adjust dose as necessary  - Continue digoxin and amiodarone. Patient on telemetry.         Type 2 diabetes mellitus with hyperglycemia    - decreased Detemir to 15 units BID w/ SSI .         Peripheral vascular disease    - continuing ASA and pletal        HATTIE on CPAP    BiPAP QHS.        Morbid obesity with alveolar hypoventilation    BiPAP QHS.         Chronic hepatitis C    Patient seen by hepatology at [facility] and in discussion regarding treatment.  Appears that his lung disease is the ultimately limiting factor for treatment (his concerns regarding current treatments and effects on pulmonary status, as well as high resistance to of hepC strain to currently available regimens).  Patient scheduled to f/u with Hepatology August 2017 with Dr. Lopez.         Rib fractures    - pt with multiple L rib fractures which can be seen on CTA 7/24; pt reports falling on O2 tank  - percocet 10 and dilaudid 0.5 PRN pain       VTE Risk Mitigation         Ordered     warfarin (COUMADIN) tablet 4 mg  Daily     Route:  Oral        08/01/17 0917          Riley Castellano MD  Department of Hospital Medicine   [facility]

## 2017-08-01 NOTE — ASSESSMENT & PLAN NOTE
Patient seen by hepatology at [facility] and in discussion regarding treatment.  Appears that his lung disease is the ultimately limiting factor for treatment (his concerns regarding current treatments and effects on pulmonary status, as well as high resistance to of hepC strain to currently available regimens).  Patient scheduled to f/u with Hepatology August 2017 with Dr. Lopez.

## 2017-08-01 NOTE — PLAN OF CARE
Problem: Diabetes, Type 2 (Adult)  Goal: Signs and Symptoms of Listed Potential Problems Will be Absent, Minimized or Managed (Diabetes, Type 2)  Signs and symptoms of listed potential problems will be absent, minimized or managed by discharge/transition of care (reference Diabetes, Type 2 (Adult) CPG).   Outcome: Ongoing (interventions implemented as appropriate)  Patient blood glucose remained stable through out shift    Problem: Fall Risk (Adult)  Goal: Absence of Falls  Patient will demonstrate the desired outcomes by discharge/transition of care.   Outcome: Ongoing (interventions implemented as appropriate)  Patient remained free falls through out shift.    Problem: Pain, Acute (Adult)  Goal: Identify Related Risk Factors and Signs and Symptoms  Related risk factors and signs and symptoms are identified upon initiation of Human Response Clinical Practice Guideline (CPG)   Outcome: Ongoing (interventions implemented as appropriate)  Pain medication given prn per MAR

## 2017-08-02 PROBLEM — E66.09 OBESITY DUE TO EXCESS CALORIES: Status: ACTIVE | Noted: 2017-07-14

## 2017-08-02 PROBLEM — I24.89 DEMAND ISCHEMIA: Status: RESOLVED | Noted: 2017-07-13 | Resolved: 2017-08-02

## 2017-08-02 LAB
ALBUMIN SERPL BCP-MCNC: 3.1 G/DL
ALP SERPL-CCNC: 76 U/L
ALT SERPL W/O P-5'-P-CCNC: 56 U/L
ANION GAP SERPL CALC-SCNC: 10 MMOL/L
AST SERPL-CCNC: 40 U/L
BASOPHILS # BLD AUTO: 0.01 K/UL
BASOPHILS NFR BLD: 0.1 %
BILIRUB SERPL-MCNC: 3.1 MG/DL
BUN SERPL-MCNC: 18 MG/DL
CALCIUM SERPL-MCNC: 8.4 MG/DL
CHLORIDE SERPL-SCNC: 98 MMOL/L
CO2 SERPL-SCNC: 32 MMOL/L
CREAT SERPL-MCNC: 1.1 MG/DL
DIFFERENTIAL METHOD: ABNORMAL
EOSINOPHIL # BLD AUTO: 0.1 K/UL
EOSINOPHIL NFR BLD: 2.1 %
ERYTHROCYTE [DISTWIDTH] IN BLOOD BY AUTOMATED COUNT: 20.5 %
EST. GFR  (AFRICAN AMERICAN): >60 ML/MIN/1.73 M^2
EST. GFR  (NON AFRICAN AMERICAN): >60 ML/MIN/1.73 M^2
GLUCOSE SERPL-MCNC: 95 MG/DL
HCT VFR BLD AUTO: 45.2 %
HGB BLD-MCNC: 14.9 G/DL
INR PPP: 1.7
LYMPHOCYTES # BLD AUTO: 2 K/UL
LYMPHOCYTES NFR BLD: 28.9 %
MAGNESIUM SERPL-MCNC: 2 MG/DL
MCH RBC QN AUTO: 29.8 PG
MCHC RBC AUTO-ENTMCNC: 33 G/DL
MCV RBC AUTO: 90 FL
MONOCYTES # BLD AUTO: 0.8 K/UL
MONOCYTES NFR BLD: 12.2 %
NEUTROPHILS # BLD AUTO: 3.8 K/UL
NEUTROPHILS NFR BLD: 56 %
PLATELET # BLD AUTO: 158 K/UL
PMV BLD AUTO: 9.2 FL
POCT GLUCOSE: 101 MG/DL (ref 70–110)
POCT GLUCOSE: 102 MG/DL (ref 70–110)
POCT GLUCOSE: 107 MG/DL (ref 70–110)
POCT GLUCOSE: 120 MG/DL (ref 70–110)
POTASSIUM SERPL-SCNC: 3.7 MMOL/L
PROT SERPL-MCNC: 6.2 G/DL
PROTHROMBIN TIME: 17.3 SEC
RBC # BLD AUTO: 5 M/UL
SODIUM SERPL-SCNC: 140 MMOL/L
WBC # BLD AUTO: 6.78 K/UL

## 2017-08-02 PROCEDURE — 83735 ASSAY OF MAGNESIUM: CPT

## 2017-08-02 PROCEDURE — 63600175 PHARM REV CODE 636 W HCPCS: Performed by: HOSPITALIST

## 2017-08-02 PROCEDURE — 25000003 PHARM REV CODE 250: Performed by: INTERNAL MEDICINE

## 2017-08-02 PROCEDURE — 25000003 PHARM REV CODE 250: Performed by: HOSPITALIST

## 2017-08-02 PROCEDURE — 25000242 PHARM REV CODE 250 ALT 637 W/ HCPCS: Performed by: STUDENT IN AN ORGANIZED HEALTH CARE EDUCATION/TRAINING PROGRAM

## 2017-08-02 PROCEDURE — 25000242 PHARM REV CODE 250 ALT 637 W/ HCPCS: Performed by: INTERNAL MEDICINE

## 2017-08-02 PROCEDURE — 25000242 PHARM REV CODE 250 ALT 637 W/ HCPCS: Performed by: HOSPITALIST

## 2017-08-02 PROCEDURE — 27000221 HC OXYGEN, UP TO 24 HOURS

## 2017-08-02 PROCEDURE — 11000001 HC ACUTE MED/SURG PRIVATE ROOM

## 2017-08-02 PROCEDURE — 25000003 PHARM REV CODE 250: Performed by: STUDENT IN AN ORGANIZED HEALTH CARE EDUCATION/TRAINING PROGRAM

## 2017-08-02 PROCEDURE — 94664 DEMO&/EVAL PT USE INHALER: CPT

## 2017-08-02 PROCEDURE — 99900035 HC TECH TIME PER 15 MIN (STAT)

## 2017-08-02 PROCEDURE — 80053 COMPREHEN METABOLIC PANEL: CPT

## 2017-08-02 PROCEDURE — 36415 COLL VENOUS BLD VENIPUNCTURE: CPT

## 2017-08-02 PROCEDURE — 94761 N-INVAS EAR/PLS OXIMETRY MLT: CPT

## 2017-08-02 PROCEDURE — 99232 SBSQ HOSP IP/OBS MODERATE 35: CPT | Mod: GC,,, | Performed by: INTERNAL MEDICINE

## 2017-08-02 PROCEDURE — 99232 SBSQ HOSP IP/OBS MODERATE 35: CPT | Mod: GC,,, | Performed by: HOSPITALIST

## 2017-08-02 PROCEDURE — 85610 PROTHROMBIN TIME: CPT

## 2017-08-02 PROCEDURE — 85025 COMPLETE CBC W/AUTO DIFF WBC: CPT

## 2017-08-02 PROCEDURE — 94640 AIRWAY INHALATION TREATMENT: CPT

## 2017-08-02 RX ORDER — HYDROMORPHONE HYDROCHLORIDE 1 MG/ML
0.5 INJECTION, SOLUTION INTRAMUSCULAR; INTRAVENOUS; SUBCUTANEOUS EVERY 4 HOURS PRN
Status: DISCONTINUED | OUTPATIENT
Start: 2017-08-02 | End: 2017-08-05

## 2017-08-02 RX ORDER — FUROSEMIDE 10 MG/ML
80 INJECTION INTRAMUSCULAR; INTRAVENOUS ONCE
Status: COMPLETED | OUTPATIENT
Start: 2017-08-02 | End: 2017-08-02

## 2017-08-02 RX ORDER — WARFARIN SODIUM 5 MG/1
5 TABLET ORAL DAILY
Status: DISCONTINUED | OUTPATIENT
Start: 2017-08-05 | End: 2017-08-03

## 2017-08-02 RX ORDER — IPRATROPIUM BROMIDE AND ALBUTEROL SULFATE 2.5; .5 MG/3ML; MG/3ML
3 SOLUTION RESPIRATORY (INHALATION) EVERY 6 HOURS PRN
Status: DISCONTINUED | OUTPATIENT
Start: 2017-08-02 | End: 2017-08-09 | Stop reason: HOSPADM

## 2017-08-02 RX ORDER — FLUTICASONE FUROATE AND VILANTEROL 100; 25 UG/1; UG/1
1 POWDER RESPIRATORY (INHALATION) DAILY
Status: DISCONTINUED | OUTPATIENT
Start: 2017-08-02 | End: 2017-08-09 | Stop reason: HOSPADM

## 2017-08-02 RX ORDER — WARFARIN SODIUM 5 MG/1
5 TABLET ORAL DAILY
Status: DISCONTINUED | OUTPATIENT
Start: 2017-08-02 | End: 2017-08-02

## 2017-08-02 RX ORDER — POTASSIUM CHLORIDE 20 MEQ/1
40 TABLET, EXTENDED RELEASE ORAL ONCE
Status: COMPLETED | OUTPATIENT
Start: 2017-08-02 | End: 2017-08-02

## 2017-08-02 RX ORDER — TIOTROPIUM BROMIDE 18 UG/1
1 CAPSULE ORAL; RESPIRATORY (INHALATION) DAILY
Status: DISCONTINUED | OUTPATIENT
Start: 2017-08-02 | End: 2017-08-09 | Stop reason: HOSPADM

## 2017-08-02 RX ADMIN — TAMSULOSIN HYDROCHLORIDE 0.4 MG: 0.4 CAPSULE ORAL at 09:08

## 2017-08-02 RX ADMIN — TIOTROPIUM BROMIDE 18 MCG: 18 CAPSULE ORAL; RESPIRATORY (INHALATION) at 06:08

## 2017-08-02 RX ADMIN — IPRATROPIUM BROMIDE AND ALBUTEROL SULFATE 3 ML: .5; 3 SOLUTION RESPIRATORY (INHALATION) at 07:08

## 2017-08-02 RX ADMIN — ASPIRIN 81 MG: 81 TABLET, COATED ORAL at 09:08

## 2017-08-02 RX ADMIN — INSULIN DETEMIR 15 UNITS: 100 INJECTION, SOLUTION SUBCUTANEOUS at 09:08

## 2017-08-02 RX ADMIN — ISOSORBIDE MONONITRATE 30 MG: 30 TABLET ORAL at 09:08

## 2017-08-02 RX ADMIN — ATORVASTATIN CALCIUM 80 MG: 20 TABLET, FILM COATED ORAL at 09:08

## 2017-08-02 RX ADMIN — HYDROMORPHONE HYDROCHLORIDE 0.5 MG: 1 INJECTION, SOLUTION INTRAMUSCULAR; INTRAVENOUS; SUBCUTANEOUS at 09:08

## 2017-08-02 RX ADMIN — GUAIFENESIN 1200 MG: 600 TABLET, EXTENDED RELEASE ORAL at 09:08

## 2017-08-02 RX ADMIN — BUDESONIDE 0.5 MG: 0.5 SUSPENSION RESPIRATORY (INHALATION) at 07:08

## 2017-08-02 RX ADMIN — ALPRAZOLAM 1 MG: 1 TABLET ORAL at 09:08

## 2017-08-02 RX ADMIN — GABAPENTIN 600 MG: 300 CAPSULE ORAL at 02:08

## 2017-08-02 RX ADMIN — FLUTICASONE FUROATE AND VILANTEROL TRIFENATATE 1 PUFF: 100; 25 POWDER RESPIRATORY (INHALATION) at 06:08

## 2017-08-02 RX ADMIN — POTASSIUM CHLORIDE 40 MEQ: 1500 TABLET, EXTENDED RELEASE ORAL at 09:08

## 2017-08-02 RX ADMIN — FLUTICASONE PROPIONATE 2 SPRAY: 50 SPRAY, METERED NASAL at 09:08

## 2017-08-02 RX ADMIN — GABAPENTIN 600 MG: 300 CAPSULE ORAL at 05:08

## 2017-08-02 RX ADMIN — HYDROMORPHONE HYDROCHLORIDE 0.5 MG: 1 INJECTION, SOLUTION INTRAMUSCULAR; INTRAVENOUS; SUBCUTANEOUS at 10:08

## 2017-08-02 RX ADMIN — DIGOXIN 0.12 MG: 125 TABLET ORAL at 09:08

## 2017-08-02 RX ADMIN — IPRATROPIUM BROMIDE AND ALBUTEROL SULFATE 3 ML: .5; 3 SOLUTION RESPIRATORY (INHALATION) at 01:08

## 2017-08-02 RX ADMIN — FUROSEMIDE 80 MG: 10 INJECTION, SOLUTION INTRAVENOUS at 02:08

## 2017-08-02 RX ADMIN — HYDROMORPHONE HYDROCHLORIDE 0.5 MG: 1 INJECTION, SOLUTION INTRAMUSCULAR; INTRAVENOUS; SUBCUTANEOUS at 06:08

## 2017-08-02 RX ADMIN — FUROSEMIDE 20 MG/HR: 10 INJECTION, SOLUTION INTRAMUSCULAR; INTRAVENOUS at 07:08

## 2017-08-02 RX ADMIN — RANOLAZINE 500 MG: 500 TABLET, FILM COATED, EXTENDED RELEASE ORAL at 09:08

## 2017-08-02 RX ADMIN — MAGNESIUM OXIDE TAB 400 MG (241.3 MG ELEMENTAL MG) 400 MG: 400 (241.3 MG) TAB at 09:08

## 2017-08-02 RX ADMIN — HYDROMORPHONE HYDROCHLORIDE 0.5 MG: 1 INJECTION, SOLUTION INTRAMUSCULAR; INTRAVENOUS; SUBCUTANEOUS at 02:08

## 2017-08-02 RX ADMIN — POLYETHYLENE GLYCOL 3350 17 G: 17 POWDER, FOR SOLUTION ORAL at 09:08

## 2017-08-02 RX ADMIN — PANTOPRAZOLE SODIUM 40 MG: 40 TABLET, DELAYED RELEASE ORAL at 09:08

## 2017-08-02 RX ADMIN — HYDROMORPHONE HYDROCHLORIDE 0.5 MG: 1 INJECTION, SOLUTION INTRAMUSCULAR; INTRAVENOUS; SUBCUTANEOUS at 04:08

## 2017-08-02 RX ADMIN — GABAPENTIN 600 MG: 300 CAPSULE ORAL at 09:08

## 2017-08-02 NOTE — SUBJECTIVE & OBJECTIVE
Interval History:     Had some intermittent lightheadedness with ambulation, no falls. Diuresing on lasix gtt. SBP in 100-120s. Otherwise, VSS. Notes that edema is improving. But remains on 5 L NC, his home dose of O 2 was 2 L in the past.     Review of Systems   Constitutional: Negative for activity change, appetite change and fever.   Respiratory: Negative for cough and shortness of breath.    Cardiovascular: Positive for leg swelling. Negative for chest pain and palpitations.        Improving    Gastrointestinal: Negative for abdominal distention and abdominal pain.   Musculoskeletal: Negative for gait problem.   Neurological: Positive for light-headedness. Negative for dizziness and weakness.        With ambulation    Psychiatric/Behavioral: Negative for confusion.     Objective:     Vital Signs (Most Recent):  Temp: 98.3 °F (36.8 °C) (08/02/17 1202)  Pulse: 74 (08/02/17 1301)  Resp: (!) 22 (08/02/17 1301)  BP: 105/65 (08/02/17 1202)  SpO2: (!) 92 % (08/02/17 1301) Vital Signs (24h Range):  Temp:  [97.6 °F (36.4 °C)-98.6 °F (37 °C)] 98.3 °F (36.8 °C)  Pulse:  [66-85] 74  Resp:  [18-24] 22  SpO2:  [86 %-97 %] 92 %  BP: ()/(50-65) 105/65     Weight: 121.9 kg (268 lb 12.8 oz)  Body mass index is 37.49 kg/m².    Intake/Output Summary (Last 24 hours) at 08/02/17 1457  Last data filed at 08/02/17 1416   Gross per 24 hour   Intake             1110 ml   Output             3595 ml   Net            -2485 ml      Physical Exam   Constitutional: He is oriented to person, place, and time. He appears well-developed and well-nourished. No distress.   HENT:   Head: Normocephalic and atraumatic.   Eyes: EOM are normal.   Neck: Neck supple.   Cardiovascular: Normal rate, regular rhythm, normal heart sounds and intact distal pulses.    Pulmonary/Chest: Effort normal. No respiratory distress. He has no wheezes.   Decreased BS bilat   Abdominal: Soft. Bowel sounds are normal. He exhibits distension. There is no tenderness.    Musculoskeletal: He exhibits edema (2+ LE edema bilaterally).   Edema improving   Neurological: He is alert and oriented to person, place, and time. No cranial nerve deficit.   Skin: Skin is warm and dry. Capillary refill takes less than 2 seconds.       Significant Labs:   CBC:   Recent Labs  Lab 08/01/17 0524 08/02/17 0423   WBC 6.55 6.78   HGB 15.1 14.9   HCT 45.3 45.2   * 158     CMP:   Recent Labs  Lab 08/01/17 0524 08/02/17 0423    140   K 3.4* 3.7   CL 99 98   CO2 30* 32*   GLU 93 95   BUN 18 18   CREATININE 1.0 1.1   CALCIUM 8.3* 8.4*   PROT 5.9* 6.2   ALBUMIN 2.9* 3.1*   BILITOT 3.1* 3.1*   ALKPHOS 78 76   AST 39 40   ALT 55* 56*   ANIONGAP 10 10   EGFRNONAA >60.0 >60.0     Lab Results   Component Value Date    INR 1.7 (H) 08/02/2017    INR 2.0 (H) 08/01/2017    INR 1.5 (H) 07/31/2017

## 2017-08-02 NOTE — ASSESSMENT & PLAN NOTE
- CHADS VASC 3  - Continue home  digoxin   - not currently receiving amiodarone - currently in sinus rhythm, will reassess amiodarone necessity   - holding home warfarin as would like INR < 2 prior to RHC on 8/4  - will consider starting patient on a NOAC/ DOAC instead of warfarin

## 2017-08-02 NOTE — PROGRESS NOTES
LOS: 6 days     24h events and S:  Shaan Brown is a 62 y.o. male who reports improvement in fluid status. Still with edema.     O:  Vitals:  Temp: 98.3 °F (36.8 °C) (08/02/17 1202)  Pulse: 74 (08/02/17 1301)  Resp: (!) 22 (08/02/17 1301)  BP: 105/65 (08/02/17 1202)  SpO2: (!) 92 % (08/02/17 1301)    Ins/Outs:    Intake/Output Summary (Last 24 hours) at 08/02/17 1430  Last data filed at 08/02/17 1416   Gross per 24 hour   Intake             1110 ml   Output             3595 ml   Net            -2485 ml       Physical Exam:  General: alert and oriented, conversant  Heart: RRR, no r/g appreciated  Lungs: CTAB  Abdomen: soft, NT, ND, +BS  Vascular: 2+ radial pulse, 2+ edema    Medications:   aspirin  81 mg Oral Daily    atorvastatin  80 mg Oral Daily    digoxin  0.125 mg Oral Daily    fluticasone  2 spray Each Nare Daily    fluticasone-vilanterol  1 puff Inhalation Daily    gabapentin  600 mg Oral TID    guaifenesin  1,200 mg Oral BID    insulin detemir  15 Units Subcutaneous BID    isosorbide mononitrate  30 mg Oral Daily    lidocaine  1 patch Transdermal Q24H    magnesium oxide  400 mg Oral BID    pantoprazole  40 mg Oral Daily    polyethylene glycol  17 g Oral Daily    ranolazine  500 mg Oral BID    tamsulosin  0.4 mg Oral Daily    tiotropium  1 capsule Inhalation Daily    warfarin  5 mg Oral Daily      furosemide (LASIX) 1 mg/mL infusion (non-titrating) 20 mg/hr (08/02/17 1346)     acetaminophen, albuterol-ipratropium 2.5mg-0.5mg/3mL, alprazolam, dextrose 50%, dextrose 50%, glucagon (human recombinant), glucose, glucose, HYDROmorphone, hydrOXYzine HCl, insulin aspart, nitroGLYCERIN, oxycodone-acetaminophen, ramelteon, sodium chloride    Review of patient's allergies indicates:  No Known Allergies    Labs:  CBC with Diff:     Recent Labs  Lab 07/31/17  0338 08/01/17  0524 08/02/17  0423   WBC 7.04 6.55 6.78   HGB 15.3 15.1 14.9   HCT 46.2 45.3 45.2   * 139* 158   LYMPH 28.0  2.0 29.3  1.9  28.9  2.0   MONO 9.8  0.7 10.1  0.7 12.2  0.8   EOSINOPHIL 2.6 2.1 2.1       COAG:    Recent Labs  Lab 07/31/17  0338 08/01/17  0524 08/02/17  0423   INR 1.5* 2.0* 1.7*       CMP:   Recent Labs  Lab 07/31/17  0338 08/01/17  0524 08/02/17  0423   GLU 76 93 95   CALCIUM 8.3* 8.3* 8.4*   ALBUMIN 2.8* 2.9* 3.1*   PROT 5.8* 5.9* 6.2    139 140   K 3.7 3.4* 3.7   CO2 30* 30* 32*   CL 99 99 98   BUN 23 18 18   CREATININE 0.9 1.0 1.1   ALKPHOS 76 78 76   ALT 52* 55* 56*   AST 44* 39 40   BILITOT 3.2* 3.1* 3.1*   MG 2.0 1.8 2.0     Estimated Creatinine Clearance: 91.3 mL/min (based on Cr of 1.1).    .  Recent Labs  Lab 07/28/17  1527 07/30/17  0053   TROPONINI  --  0.027*   *  --          Diagnostic Results:  Ejection Fractions   EF   Date Value Ref Range Status   07/31/2017 55 55 - 65    03/13/2015 65 55 - 65         Infectious disease labs:  Microbiology Results (last 7 days)     ** No results found for the last 168 hours. **          Assessment and Plan:  Shaan Brown is a 62 y.o. gentleman with PMH of HFpEF (EF 50%), COPD on home O2 (4-5L NC), morbid obesity, obesity hypoventilation syndrome (OHS), CAD s/p CABG (2000), s/p PCI (05/2016) on ASA/Plavix, PAD s/p PTCA in 2000 on ASA/pletal, paroxysmal AF s/p DCCV (05/2016), hx of cardiac arrest s/p CPR (2016), Hepatitis C (not on treatment due to lung disease limiting his life expectancy more than hep. C) who presented to [facility] as a transfer from [facility] for management of acute on chronic respiratory failure and PHT management.     PHT  - recommend treating underlying conditions (COPD, HFpEF, HATTIE)  - goal euvolemic by time of RHC; c/w IV Lasix gtt  - case request submitted for RHC on Friday (discussed with primary team)  - ideal if INR is <2; would stop warfarin ([id number] = 3, no indication to bridge)    Prasad Wood MD

## 2017-08-02 NOTE — ASSESSMENT & PLAN NOTE
- Spiriva  - breo inhaler  - Duo-nebs prn  - may likely need PFTs to assess for severity of COPD - possibly as outpatient post RHC

## 2017-08-02 NOTE — PLAN OF CARE
Problem: Patient Care Overview  Goal: Plan of Care Review  Outcome: Ongoing (interventions implemented as appropriate)  POC reviewed with the pt and spouse verbalized understanding. AAOx4  And able to verbalized needs and wants. Accucheck results within normal and no coverage needed. No acute distress noted, wife gave pt a bedbath. Pts bed in low position and wheels locked. Wife at bedside.

## 2017-08-02 NOTE — ASSESSMENT & PLAN NOTE
- Troponemia prior to transfer thought to be 2/2 to demand ischemia  - cont isosorbide mononitrate, ranolazine, ASA and atorvastatin.   - pt reports stent placement in 5/2016. Will obtain outside records to determine the need for continuing plavix  - has had chest pain during this admission c/w previous angina but may be 2/2 rib fractures; SL nitroglycerin PRN

## 2017-08-02 NOTE — ASSESSMENT & PLAN NOTE
- multifactorial with pulm HTN, RH failure, COPD, HATTIE, OHS  - Hypoxic at baseline w/ chronic supplemental O2 requirement (4-5 liters) and Trilogy at night, transferred for pulm-HTN eval.   - Continued guaifenesin, DuoNeb q6 and Budesonide q12.  - repeat ECHO showed normal EF and elevated estimated PASP, but was unable to accurately determine PA pressure due to inadequate TR jet.  - pulmonology and HTS consulted, appreciate recs    - increase  lasix gtt from 10 --> 200 mg/hr (with an extra 80 IV bolus )  - planned for RHC on 8/4

## 2017-08-02 NOTE — PROGRESS NOTES
[facility] Hospital Medicine  Progress Note    Patient Name: Shaan Brown  MRN: [id number]  Patient Class: IP- Inpatient   Admission Date: 7/27/2017  Length of Stay: 6 days  Attending Physician: Donnie Rueda MD  Primary Care Provider: Mendel Ferguson Iii, MD    Hospital Medicine Team: [facility] 1 Mary Mccallum MD    Subjective:     Principal Problem:Acute on chronic respiratory failure with hypoxia and hypercapnia    HPI:  Shaan Brown is a 62-year-old male w/ chronic mixed hypercapnia and hypoxemia 2/2 COPD, HFpEF, HATTIE and obesity hypoventilation who's on 4-5 liters of continuous supplemental O2 (SpO2 typically in the low 90s-80s)  w/ Trilogy at night. He presented to [facility] on 7/13 via EMS for worsening hypoxia and altered mental status. Initially admitted to the ICU. Worsening hypoxia was thought to be 2/2 to pulmonary HTN w/ PA pressures of 71. Bubble study was negative for shunt. Pt was also started on therapeutic Lovenox and was being bridged to warfarin for suspected PE, despite negative CTA. Finished abx treatment for RLL PNA. Diuretics were held 2/2 to hypotension. Pt has what looks to be a NSTEMI 2/2 to demand, however patient was not tolerate of beta-blocker therapy 2/2 to hypoxia. No improvement in hypoxia despite steroids. Transferred to [facility] for evaluation by pulmonology for pulmonary HTN.   On arrive, pt was hypoxic on 5 liters of supplemental O2. He was complaining of lower back pain.     Hospital Course:  Transferred from [facility] to [facility] IM-1 service for evaluation of worsening pulmonary HTN. He was continued on scheduled duonebs, budesonide full dose lovenox. Diuresis with lasix 40 IV BID was started. Pulmonology was consulted and recommended continued diuresis and aerosol treatments, heart transplant consult for PHtn, and stopping full dose AC. Heart transplant was consulted and recommended repeating the ECHO following  aggressive diuresis. Pt c/o L chest wall pain that he reports began after he fell onto an O2 container. On review of CTA from 7/24, pt has multiple fractured ribs on L side; PRN pain medication ordered. He also reported episodes of chest pain during this admission which were consistent with previous episodes of angina; workup not concerning for infarction. Lasix IVP switched to lasix gtt to improve diuresis. ECHO 7/31 showed normal EF and elevated estimated PASP, but was unable to accurately determine PA pressure due to inadequate TR jet. Continued on lasix gtt; rate increased from 10 cc/hr to 20cc/hr for more adequate diuresis     Interval History:     Had some intermittent lightheadedness with ambulation, no falls. Diuresing on lasix gtt. SBP in 100-120s. Otherwise, VSS. Notes that edema is improving. But remains on 5 L NC, his home dose of O 2 was 2 L in the past.     Review of Systems   Constitutional: Negative for activity change, appetite change and fever.   Respiratory: Negative for cough and shortness of breath.    Cardiovascular: Positive for leg swelling. Negative for chest pain and palpitations.        Improving    Gastrointestinal: Negative for abdominal distention and abdominal pain.   Musculoskeletal: Negative for gait problem.   Neurological: Positive for light-headedness. Negative for dizziness and weakness.        With ambulation    Psychiatric/Behavioral: Negative for confusion.     Objective:     Vital Signs (Most Recent):  Temp: 98.3 °F (36.8 °C) (08/02/17 1202)  Pulse: 74 (08/02/17 1301)  Resp: (!) 22 (08/02/17 1301)  BP: 105/65 (08/02/17 1202)  SpO2: (!) 92 % (08/02/17 1301) Vital Signs (24h Range):  Temp:  [97.6 °F (36.4 °C)-98.6 °F (37 °C)] 98.3 °F (36.8 °C)  Pulse:  [66-85] 74  Resp:  [18-24] 22  SpO2:  [86 %-97 %] 92 %  BP: ()/(50-65) 105/65     Weight: 121.9 kg (268 lb 12.8 oz)  Body mass index is 37.49 kg/m².    Intake/Output Summary (Last 24 hours) at 08/02/17 1457  Last data filed  at 08/02/17 1416   Gross per 24 hour   Intake             1110 ml   Output             3595 ml   Net            -2485 ml      Physical Exam   Constitutional: He is oriented to person, place, and time. He appears well-developed and well-nourished. No distress.   HENT:   Head: Normocephalic and atraumatic.   Eyes: EOM are normal.   Neck: Neck supple.   Cardiovascular: Normal rate, regular rhythm, normal heart sounds and intact distal pulses.    Pulmonary/Chest: Effort normal. No respiratory distress. He has no wheezes.   Decreased BS bilat   Abdominal: Soft. Bowel sounds are normal. He exhibits distension. There is no tenderness.   Musculoskeletal: He exhibits edema (2+ LE edema bilaterally).   Edema improving   Neurological: He is alert and oriented to person, place, and time. No cranial nerve deficit.   Skin: Skin is warm and dry. Capillary refill takes less than 2 seconds.       Significant Labs:   CBC:   Recent Labs  Lab 08/01/17  0524 08/02/17  0423   WBC 6.55 6.78   HGB 15.1 14.9   HCT 45.3 45.2   * 158     CMP:   Recent Labs  Lab 08/01/17  0524 08/02/17  0423    140   K 3.4* 3.7   CL 99 98   CO2 30* 32*   GLU 93 95   BUN 18 18   CREATININE 1.0 1.1   CALCIUM 8.3* 8.4*   PROT 5.9* 6.2   ALBUMIN 2.9* 3.1*   BILITOT 3.1* 3.1*   ALKPHOS 78 76   AST 39 40   ALT 55* 56*   ANIONGAP 10 10   EGFRNONAA >60.0 >60.0     Lab Results   Component Value Date    INR 1.7 (H) 08/02/2017    INR 2.0 (H) 08/01/2017    INR 1.5 (H) 07/31/2017         Assessment/Plan:      * Acute on chronic respiratory failure with hypoxia and hypercapnia    - multifactorial with pulm HTN, RH failure, COPD, HATTIE, OHS  - Hypoxic at baseline w/ chronic supplemental O2 requirement (4-5 liters) and Trilogy at night, transferred for pulm-HTN eval.   - Continued guaifenesin, DuoNeb q6 and Budesonide q12.  - repeat ECHO showed normal EF and elevated estimated PASP, but was unable to accurately determine PA pressure due to inadequate TR jet.  -  pulmonology and HTS consulted, appreciate recs    - increase  lasix gtt from 10 --> 200 mg/hr (with an extra 80 IV bolus )  - planned for RHC on 8/4          Pulmonary HTN    - as above  - RHC on 8/4          Chronic obstructive pulmonary disease with acute exacerbation    - Spiriva  - breo inhaler  - Duo-nebs prn  - may likely need PFTs to assess for severity of COPD - possibly as outpatient post RHC        Coronary artery disease involving native coronary artery of native heart without angina pectoris    - Troponemia prior to transfer thought to be 2/2 to demand ischemia  - cont isosorbide mononitrate, ranolazine, ASA and atorvastatin.   - pt reports stent placement in 5/2016. Will obtain outside records to determine the need for continuing plavix  - has had chest pain during this admission c/w previous angina but may be 2/2 rib fractures; SL nitroglycerin PRN        Morbid obesity with alveolar hypoventilation    BiPAP QHS.         HATTIE on CPAP    BiPAP QHS.        Paroxysmal atrial fibrillation    - CHADS VASC 3  - Continue home  digoxin   - not currently receiving amiodarone - currently in sinus rhythm, will reassess amiodarone necessity   - holding home warfarin as would like INR < 2 prior to RHC on 8/4  - will consider starting patient on a NOAC/ DOAC instead of warfarin         Type 2 diabetes mellitus with hyperglycemia    - Detemir to 15 units BID w/ SSI - adequate control         Rib fractures    - pt with multiple L rib fractures which can be seen on CTA 7/24; pt reports falling on O2 tank  - percocet 10 and dilaudid 0.5 PRN pain        Peripheral vascular disease    - continuing ASA and atorvastatin         Chronic hepatitis C    Patient seen by hepatology at [facility] and in discussion regarding treatment.  Appears that his lung disease is the ultimately limiting factor for treatment (his concerns regarding current treatments and effects on pulmonary status, as well as high resistance to of hepC strain to  currently available regimens).  Patient scheduled to f/u with Hepatology August 2017 with Dr. Lopez.         Obesity due to excess calories                VTE Risk Mitigation         Ordered     warfarin (COUMADIN) tablet 5 mg  Daily     Route:  Oral        08/02/17 1439        Holding warfarin  Diuresis  RHC on 8/4    Mary Mccallum MD  Department of Hospital Medicine   [facility]

## 2017-08-02 NOTE — PLAN OF CARE
Problem: Patient Care Overview  Goal: Plan of Care Review  Outcome: Ongoing (interventions implemented as appropriate)  Pt is AOx4. AVSS; sats remain > 85% on 5L NC. CPAP worn throughout the night. C/o some pain/discomfort in lower back & left side near fractured ribs. Administered lidocaine patch to L side & PRN dilaudid per pt request. Maintained Lasix gtt @ 10ml/hr throughout shift. Remains free from fall/injury overnight. WCTM.

## 2017-08-03 LAB
ALBUMIN SERPL BCP-MCNC: 3.1 G/DL
ALP SERPL-CCNC: 81 U/L
ALT SERPL W/O P-5'-P-CCNC: 61 U/L
ANION GAP SERPL CALC-SCNC: 12 MMOL/L
AST SERPL-CCNC: 46 U/L
BILIRUB SERPL-MCNC: 3.1 MG/DL
BUN SERPL-MCNC: 15 MG/DL
CALCIUM SERPL-MCNC: 8.6 MG/DL
CHLORIDE SERPL-SCNC: 99 MMOL/L
CO2 SERPL-SCNC: 30 MMOL/L
CREAT SERPL-MCNC: 1.1 MG/DL
EST. GFR  (AFRICAN AMERICAN): >60 ML/MIN/1.73 M^2
EST. GFR  (NON AFRICAN AMERICAN): >60 ML/MIN/1.73 M^2
ESTIMATED AVG GLUCOSE: 206 MG/DL
GLUCOSE SERPL-MCNC: 81 MG/DL
HBA1C MFR BLD HPLC: 8.8 %
INR PPP: 1.8
MAGNESIUM SERPL-MCNC: 1.8 MG/DL
POCT GLUCOSE: 108 MG/DL (ref 70–110)
POCT GLUCOSE: 134 MG/DL (ref 70–110)
POCT GLUCOSE: 85 MG/DL (ref 70–110)
POCT GLUCOSE: 91 MG/DL (ref 70–110)
POTASSIUM SERPL-SCNC: 3.3 MMOL/L
PROT SERPL-MCNC: 6.4 G/DL
PROTHROMBIN TIME: 18.1 SEC
SODIUM SERPL-SCNC: 141 MMOL/L

## 2017-08-03 PROCEDURE — 27000173 HC ACAPELLA DEVICE DH OR DM

## 2017-08-03 PROCEDURE — 99900035 HC TECH TIME PER 15 MIN (STAT)

## 2017-08-03 PROCEDURE — 36415 COLL VENOUS BLD VENIPUNCTURE: CPT

## 2017-08-03 PROCEDURE — 25000003 PHARM REV CODE 250: Performed by: STUDENT IN AN ORGANIZED HEALTH CARE EDUCATION/TRAINING PROGRAM

## 2017-08-03 PROCEDURE — 94664 DEMO&/EVAL PT USE INHALER: CPT

## 2017-08-03 PROCEDURE — 99232 SBSQ HOSP IP/OBS MODERATE 35: CPT | Mod: GC,,, | Performed by: HOSPITALIST

## 2017-08-03 PROCEDURE — 80053 COMPREHEN METABOLIC PANEL: CPT

## 2017-08-03 PROCEDURE — 11000001 HC ACUTE MED/SURG PRIVATE ROOM

## 2017-08-03 PROCEDURE — 25000242 PHARM REV CODE 250 ALT 637 W/ HCPCS: Performed by: HOSPITALIST

## 2017-08-03 PROCEDURE — 25000003 PHARM REV CODE 250: Performed by: HOSPITALIST

## 2017-08-03 PROCEDURE — 85610 PROTHROMBIN TIME: CPT

## 2017-08-03 PROCEDURE — 83036 HEMOGLOBIN GLYCOSYLATED A1C: CPT

## 2017-08-03 PROCEDURE — 63600175 PHARM REV CODE 636 W HCPCS: Performed by: HOSPITALIST

## 2017-08-03 PROCEDURE — 83735 ASSAY OF MAGNESIUM: CPT

## 2017-08-03 PROCEDURE — 25000003 PHARM REV CODE 250: Performed by: INTERNAL MEDICINE

## 2017-08-03 RX ORDER — POTASSIUM CHLORIDE 20 MEQ/1
40 TABLET, EXTENDED RELEASE ORAL ONCE
Status: COMPLETED | OUTPATIENT
Start: 2017-08-03 | End: 2017-08-03

## 2017-08-03 RX ADMIN — GABAPENTIN 600 MG: 300 CAPSULE ORAL at 09:08

## 2017-08-03 RX ADMIN — OXYCODONE HYDROCHLORIDE AND ACETAMINOPHEN 1 TABLET: 10; 325 TABLET ORAL at 05:08

## 2017-08-03 RX ADMIN — ASPIRIN 81 MG: 81 TABLET, COATED ORAL at 08:08

## 2017-08-03 RX ADMIN — TAMSULOSIN HYDROCHLORIDE 0.4 MG: 0.4 CAPSULE ORAL at 08:08

## 2017-08-03 RX ADMIN — MAGNESIUM OXIDE TAB 400 MG (241.3 MG ELEMENTAL MG) 400 MG: 400 (241.3 MG) TAB at 09:08

## 2017-08-03 RX ADMIN — DIGOXIN 0.12 MG: 125 TABLET ORAL at 08:08

## 2017-08-03 RX ADMIN — GABAPENTIN 600 MG: 300 CAPSULE ORAL at 05:08

## 2017-08-03 RX ADMIN — ATORVASTATIN CALCIUM 80 MG: 20 TABLET, FILM COATED ORAL at 08:08

## 2017-08-03 RX ADMIN — ALPRAZOLAM 1 MG: 1 TABLET ORAL at 09:08

## 2017-08-03 RX ADMIN — HYDROMORPHONE HYDROCHLORIDE 0.5 MG: 1 INJECTION, SOLUTION INTRAMUSCULAR; INTRAVENOUS; SUBCUTANEOUS at 04:08

## 2017-08-03 RX ADMIN — HYDROMORPHONE HYDROCHLORIDE 0.5 MG: 1 INJECTION, SOLUTION INTRAMUSCULAR; INTRAVENOUS; SUBCUTANEOUS at 09:08

## 2017-08-03 RX ADMIN — GUAIFENESIN 1200 MG: 600 TABLET, EXTENDED RELEASE ORAL at 09:08

## 2017-08-03 RX ADMIN — FLUTICASONE FUROATE AND VILANTEROL TRIFENATATE 1 PUFF: 100; 25 POWDER RESPIRATORY (INHALATION) at 08:08

## 2017-08-03 RX ADMIN — INSULIN DETEMIR 15 UNITS: 100 INJECTION, SOLUTION SUBCUTANEOUS at 09:08

## 2017-08-03 RX ADMIN — HYDROMORPHONE HYDROCHLORIDE 0.5 MG: 1 INJECTION, SOLUTION INTRAMUSCULAR; INTRAVENOUS; SUBCUTANEOUS at 12:08

## 2017-08-03 RX ADMIN — FUROSEMIDE 20 MG/HR: 10 INJECTION, SOLUTION INTRAMUSCULAR; INTRAVENOUS at 01:08

## 2017-08-03 RX ADMIN — GUAIFENESIN 1200 MG: 600 TABLET, EXTENDED RELEASE ORAL at 08:08

## 2017-08-03 RX ADMIN — TIOTROPIUM BROMIDE 18 MCG: 18 CAPSULE ORAL; RESPIRATORY (INHALATION) at 08:08

## 2017-08-03 RX ADMIN — HYDROMORPHONE HYDROCHLORIDE 0.5 MG: 1 INJECTION, SOLUTION INTRAMUSCULAR; INTRAVENOUS; SUBCUTANEOUS at 08:08

## 2017-08-03 RX ADMIN — PANTOPRAZOLE SODIUM 40 MG: 40 TABLET, DELAYED RELEASE ORAL at 08:08

## 2017-08-03 RX ADMIN — RANOLAZINE 500 MG: 500 TABLET, FILM COATED, EXTENDED RELEASE ORAL at 09:08

## 2017-08-03 RX ADMIN — FLUTICASONE PROPIONATE 2 SPRAY: 50 SPRAY, METERED NASAL at 08:08

## 2017-08-03 RX ADMIN — GABAPENTIN 600 MG: 300 CAPSULE ORAL at 02:08

## 2017-08-03 RX ADMIN — MAGNESIUM OXIDE TAB 400 MG (241.3 MG ELEMENTAL MG) 400 MG: 400 (241.3 MG) TAB at 08:08

## 2017-08-03 RX ADMIN — POTASSIUM CHLORIDE 40 MEQ: 1500 TABLET, EXTENDED RELEASE ORAL at 09:08

## 2017-08-03 RX ADMIN — RANOLAZINE 500 MG: 500 TABLET, FILM COATED, EXTENDED RELEASE ORAL at 08:08

## 2017-08-03 NOTE — SUBJECTIVE & OBJECTIVE
Interval History: NAEON. Reports continued improvement in breathing, edema, and rib pain. Diuresing well, UOP over last 24h 3970cc (-14.2 L since admission).    Review of Systems  Objective:     Vital Signs (Most Recent):  Temp: 97.9 °F (36.6 °C) (08/03/17 0753)  Pulse: 67 (08/03/17 0753)  Resp: 16 (08/03/17 0753)  BP: (!) 106/59 (08/03/17 0753)  SpO2: (!) 90 % (08/03/17 0753) Vital Signs (24h Range):  Temp:  [97.3 °F (36.3 °C)-98.9 °F (37.2 °C)] 97.9 °F (36.6 °C)  Pulse:  [66-78] 67  Resp:  [16-24] 16  SpO2:  [90 %-95 %] 90 %  BP: ()/(50-69) 106/59     Weight: 120.4 kg (265 lb 6.4 oz)  Body mass index is 37.02 kg/m².    Intake/Output Summary (Last 24 hours) at 08/03/17 0803  Last data filed at 08/03/17 0558   Gross per 24 hour   Intake             1080 ml   Output             3970 ml   Net            -2890 ml      Physical Exam   Constitutional: He is oriented to person, place, and time. He appears well-developed and well-nourished. No distress.   Eyes: EOM are normal.   Neck: Neck supple.   Cardiovascular: Normal rate, regular rhythm, normal heart sounds and intact distal pulses.    Pulmonary/Chest: Effort normal. No respiratory distress. He has no wheezes. He exhibits tenderness (L chest wall).   Decreased BS bilateral bases   Abdominal: Soft. Bowel sounds are normal. He exhibits distension. There is no tenderness.   Musculoskeletal: He exhibits edema (2+ LE edema bilaterally).   Edema improving   Neurological: He is alert and oriented to person, place, and time. No cranial nerve deficit.   Skin: Skin is warm and dry. Capillary refill takes less than 2 seconds.       Significant Labs:   A1C:   Recent Labs  Lab 07/14/17  0353 08/03/17  0436   HGBA1C 13.1* 8.8*     CMP:   Recent Labs  Lab 08/02/17  0423 08/03/17  0435    141   K 3.7 3.3*   CL 98 99   CO2 32* 30*   GLU 95 81   BUN 18 15   CREATININE 1.1 1.1   CALCIUM 8.4* 8.6*   PROT 6.2 6.4   ALBUMIN 3.1* 3.1*   BILITOT 3.1* 3.1*   ALKPHOS 76 81   AST  40 46*   ALT 56* 61*   ANIONGAP 10 12   EGFRNONAA >60.0 >60.0     Coagulation:   Recent Labs  Lab 08/03/17  0435   INR 1.8*     POCT Glucose:   Recent Labs  Lab 08/02/17  1638 08/02/17  2148 08/03/17  0800   POCTGLUCOSE 107 120* 85     All pertinent labs within the past 24 hours have been reviewed.    Significant Imaging: I have reviewed all pertinent imaging results/findings within the past 24 hours.

## 2017-08-03 NOTE — PLAN OF CARE
Problem: Patient Care Overview  Goal: Plan of Care Review  Outcome: Ongoing (interventions implemented as appropriate)  POC discussed with the patient and verbalized understanding. Pt was upset earlier about the cancellation of the procedure today and calm down when the doctor explained to him and pt verbalized understanding. IV lasix infusion continues at 20ml/hr with good output. Safety precautions maintained. Wife at bedside.

## 2017-08-03 NOTE — PLAN OF CARE
Problem: Patient Care Overview  Goal: Plan of Care Review  Outcome: Ongoing (interventions implemented as appropriate)  AAOx4. VSS. No  c/o of LOC, n/v, diaphoresis. Continuous Lasix continued, tolerating well. Voiding  Spontaneously(per flowsheet for I/O). NPO at midnight order completed. Questions and concerns addressed. No acute events.     Problem: Diabetes, Type 2 (Adult)  Goal: Signs and Symptoms of Listed Potential Problems Will be Absent, Minimized or Managed (Diabetes, Type 2)  Signs and symptoms of listed potential problems will be absent, minimized or managed by discharge/transition of care (reference Diabetes, Type 2 (Adult) CPG).   Outcome: Ongoing (interventions implemented as appropriate)  NS=[id number], no coverage needed.     Problem: Fall Risk (Adult)  Goal: Identify Related Risk Factors and Signs and Symptoms  Related risk factors and signs and symptoms are identified upon initiation of Human Response Clinical Practice Guideline (CPG)   Outcome: Ongoing (interventions implemented as appropriate)  Safety precautions maintained. Remained free of falls/injury/trauma/ bed in lowest position and wheels locked. Call bell in reach; pt refused non-skid socks.     Problem: Pain, Acute (Adult)  Goal: Identify Related Risk Factors and Signs and Symptoms  Related risk factors and signs and symptoms are identified upon initiation of Human Response Clinical Practice Guideline (CPG)   Outcome: Ongoing (interventions implemented as appropriate)  Pain management controlled with PRN dilaudid Q4, moderated relief obtained.

## 2017-08-03 NOTE — PROGRESS NOTES
[facility] Hospital Medicine  Progress Note    Patient Name: Shaan Brown  MRN: [id number]  Patient Class: IP- Inpatient   Admission Date: 7/27/2017  Length of Stay: 7 days  Attending Physician: Donnie Rueda MD  Primary Care Provider: Mendel Ferguson Iii, MD    [facility] Medicine Team: [facility]OSP MED 1 Riley Castellano MD    Subjective:     Principal Problem:Acute on chronic respiratory failure with hypoxia and hypercapnia    HPI:  Shaan Brown is a 62-year-old male w/ chronic mixed hypercapnia and hypoxemia 2/2 COPD, HFpEF, HATTIE and obesity hypoventilation who's on 4-5 liters of continuous supplemental O2 (SpO2 typically in the low 90s-80s)  w/ Trilogy at night. He presented to [facility] on 7/13 via EMS for worsening hypoxia and altered mental status. Initially admitted to the ICU. Worsening hypoxia was thought to be 2/2 to pulmonary HTN w/ PA pressures of 71. Bubble study was negative for shunt. Pt was also started on therapeutic Lovenox and was being bridged to warfarin for suspected PE, despite negative CTA. Finished abx treatment for RLL PNA. Diuretics were held 2/2 to hypotension. Pt has what looks to be a NSTEMI 2/2 to demand, however patient was not tolerate of beta-blocker therapy 2/2 to hypoxia. No improvement in hypoxia despite steroids. Transferred to [facility] for evaluation by pulmonology for pulmonary HTN.   On arrive, pt was hypoxic on 5 liters of supplemental O2. He was complaining of lower back pain.     Hospital Course:  Transferred from [facility] to [facility]'s IM-1 service for evaluation of worsening pulmonary HTN. He was continued on scheduled duonebs, budesonide full dose lovenox. Diuresis with lasix 40 IV BID was started. Pulmonology was consulted and recommended continued diuresis and aerosol treatments, heart transplant consult for PHtn, and stopping full dose AC. Heart transplant was consulted and recommended repeating the ECHO following  aggressive diuresis. Pt c/o L chest wall pain that he reports began after he fell onto an O2 container. On review of CTA from 7/24, pt has multiple fractured ribs on L side; PRN pain medication ordered. He also reported episodes of chest pain during this admission which were consistent with previous episodes of angina; workup not concerning for infarction. Lasix IVP switched to lasix gtt to improve diuresis. ECHO 7/31 showed normal EF and elevated estimated PASP, but was unable to accurately determine PA pressure due to inadequate TR jet. Continued on lasix gtt; rate increased from 10 cc/hr to 20cc/hr for more adequate diuresis.     Interval History: NAEON. Reports continued improvement in breathing, edema, and rib pain. Diuresing well, UOP over last 24h 3970cc (-14.2 L since admission).    Review of Systems  Objective:     Vital Signs (Most Recent):  Temp: 97.9 °F (36.6 °C) (08/03/17 0753)  Pulse: 67 (08/03/17 0753)  Resp: 16 (08/03/17 0753)  BP: (!) 106/59 (08/03/17 0753)  SpO2: (!) 90 % (08/03/17 0753) Vital Signs (24h Range):  Temp:  [97.3 °F (36.3 °C)-98.9 °F (37.2 °C)] 97.9 °F (36.6 °C)  Pulse:  [66-78] 67  Resp:  [16-24] 16  SpO2:  [90 %-95 %] 90 %  BP: ()/(50-69) 106/59     Weight: 120.4 kg (265 lb 6.4 oz)  Body mass index is 37.02 kg/m².    Intake/Output Summary (Last 24 hours) at 08/03/17 0803  Last data filed at 08/03/17 0558   Gross per 24 hour   Intake             1080 ml   Output             3970 ml   Net            -2890 ml      Physical Exam   Constitutional: He is oriented to person, place, and time. He appears well-developed and well-nourished. No distress.   Eyes: EOM are normal.   Neck: Neck supple.   Cardiovascular: Normal rate, regular rhythm, normal heart sounds and intact distal pulses.    Pulmonary/Chest: Effort normal. No respiratory distress. He has no wheezes. He exhibits tenderness (L chest wall).   Decreased BS bilateral bases   Abdominal: Soft. Bowel sounds are normal. He  exhibits distension. There is no tenderness.   Musculoskeletal: He exhibits edema (2+ LE edema bilaterally).   Edema improving   Neurological: He is alert and oriented to person, place, and time. No cranial nerve deficit.   Skin: Skin is warm and dry. Capillary refill takes less than 2 seconds.       Significant Labs:   A1C:   Recent Labs  Lab 07/14/17  0353 08/03/17  0436   HGBA1C 13.1* 8.8*     CMP:   Recent Labs  Lab 08/02/17  0423 08/03/17  0435    141   K 3.7 3.3*   CL 98 99   CO2 32* 30*   GLU 95 81   BUN 18 15   CREATININE 1.1 1.1   CALCIUM 8.4* 8.6*   PROT 6.2 6.4   ALBUMIN 3.1* 3.1*   BILITOT 3.1* 3.1*   ALKPHOS 76 81   AST 40 46*   ALT 56* 61*   ANIONGAP 10 12   EGFRNONAA >60.0 >60.0     Coagulation:   Recent Labs  Lab 08/03/17  0435   INR 1.8*     POCT Glucose:   Recent Labs  Lab 08/02/17  1638 08/02/17  2148 08/03/17  0800   POCTGLUCOSE 107 120* 85     All pertinent labs within the past 24 hours have been reviewed.    Significant Imaging: I have reviewed all pertinent imaging results/findings within the past 24 hours.    Assessment/Plan:      * Acute on chronic respiratory failure with hypoxia and hypercapnia    - multifactorial with pulm HTN, RH failure, COPD, HATTIE, OHS  - Hypoxic at baseline w/ chronic supplemental O2 requirement (4-5 liters) and Trilogy at night, transferred for pulm-HTN eval.   - Continued guaifenesin, DuoNeb q6 and Budesonide q12.  - repeat ECHO showed normal EF and elevated estimated PASP, but was unable to accurately determine PA pressure due to inadequate TR jet.  - pulmonology and HTS consulted, appreciate recs  - increased lasix gtt from 10--> 20 mg/hr   - plan for RHC on 8/4, holding warfarin        Coronary artery disease involving native coronary artery of native heart without angina pectoris    - Troponemia prior to transfer thought to be 2/2 to demand ischemia  - cont isosorbide mononitrate, ranolazine, ASA and atorvastatin.   - pt reports stent placement in 5/2016.  Will obtain outside records to determine the need for continuing plavix  - has had chest pain during this admission c/w previous angina but may be 2/2 rib fractures; SL nitroglycerin PRN        Chronic obstructive pulmonary disease with acute exacerbation    - Spiriva  - breo inhaler  - Duo-nebs prn  - may likely need PFTs to assess for severity of COPD - possibly as outpatient post RHC        Paroxysmal atrial fibrillation    - CHADS VASC 3  - Continue home  digoxin   - not currently receiving amiodarone - currently in sinus rhythm, will reassess amiodarone necessity   - holding home warfarin as would like INR < 2 prior to RHC on 8/4  - will consider starting patient on a NOAC/ DOAC instead of warfarin         Type 2 diabetes mellitus with hyperglycemia    - Detemir to 15 units BID w/ SSI - adequate control         Peripheral vascular disease    - continuing ASA and atorvastatin         HATTIE on CPAP    BiPAP QHS.        Morbid obesity with alveolar hypoventilation    BiPAP QHS.         Chronic hepatitis C    Patient seen by hepatology at [facility] and in discussion regarding treatment.  Appears that his lung disease is the ultimately limiting factor for treatment (his concerns regarding current treatments and effects on pulmonary status, as well as high resistance to of hepC strain to currently available regimens).  Patient scheduled to f/u with Hepatology August 2017 with Dr. Lopez.         Rib fractures    - pt with multiple L rib fractures which can be seen on CTA 7/24; pt reports falling on O2 tank  - percocet 10 and dilaudid 0.5 PRN pain        Pulmonary HTN    - as above  - RHC on 8/4        Obesity due to excess calories    - 1200 demetria diabetic diet          VTE Risk Mitigation     None              Riley Castellano MD  Department of Hospital Medicine   [facility]

## 2017-08-03 NOTE — ASSESSMENT & PLAN NOTE
Patient seen by hepatology at [facility] and in discussion regarding treatment.  Appears that his lung disease is the ultimately limiting factor for treatment (his concerns regarding current treatments and effects on pulmonary status, as well as high resistance to of hepC strain to currently available regimens).  Patient scheduled to f/u with Hepatology August 2017 with Dr. Lopez.

## 2017-08-03 NOTE — PLAN OF CARE
Plan- Home with home health; Pending PT rec        08/03/17 4551   Right Care Assessment   Can the patient answer the patient profile reliably? Yes, cognitively intact   How often would a person be available to care for the patient? Often   Describe the patient's ability to walk at the present time. No restrictions   How does the patient rate their overall health at the present time? Fair   Number of comorbid conditions (as recorded on the chart) Three

## 2017-08-03 NOTE — ASSESSMENT & PLAN NOTE
- multifactorial with pulm HTN, RH failure, COPD, HATTIE, OHS  - Hypoxic at baseline w/ chronic supplemental O2 requirement (4-5 liters) and Trilogy at night, transferred for pulm-HTN eval.   - Continued guaifenesin, DuoNeb q6 and Budesonide q12.  - repeat ECHO showed normal EF and elevated estimated PASP, but was unable to accurately determine PA pressure due to inadequate TR jet.  - pulmonology and HTS consulted, appreciate recs  - increased lasix gtt from 10--> 20 mg/hr   - plan for RHC on 8/4, holding warfarin

## 2017-08-04 PROBLEM — J96.90 RESPIRATORY FAILURE: Status: ACTIVE | Noted: 2017-08-04

## 2017-08-04 LAB
ALBUMIN SERPL BCP-MCNC: 3 G/DL
ALP SERPL-CCNC: 85 U/L
ALT SERPL W/O P-5'-P-CCNC: 55 U/L
ANION GAP SERPL CALC-SCNC: 12 MMOL/L
AST SERPL-CCNC: 41 U/L
BASOPHILS # BLD AUTO: 0.02 K/UL
BASOPHILS NFR BLD: 0.3 %
BILIRUB SERPL-MCNC: 3.1 MG/DL
BUN SERPL-MCNC: 17 MG/DL
CALCIUM SERPL-MCNC: 8.6 MG/DL
CHLORIDE SERPL-SCNC: 100 MMOL/L
CO2 SERPL-SCNC: 28 MMOL/L
CREAT SERPL-MCNC: 1.1 MG/DL
DIFFERENTIAL METHOD: ABNORMAL
EOSINOPHIL # BLD AUTO: 0.1 K/UL
EOSINOPHIL NFR BLD: 1.6 %
ERYTHROCYTE [DISTWIDTH] IN BLOOD BY AUTOMATED COUNT: 20.1 %
EST. GFR  (AFRICAN AMERICAN): >60 ML/MIN/1.73 M^2
EST. GFR  (NON AFRICAN AMERICAN): >60 ML/MIN/1.73 M^2
GLUCOSE SERPL-MCNC: 97 MG/DL
HCT VFR BLD AUTO: 45 %
HGB BLD-MCNC: 15 G/DL
INR PPP: 1.4
LYMPHOCYTES # BLD AUTO: 2.2 K/UL
LYMPHOCYTES NFR BLD: 31.2 %
MAGNESIUM SERPL-MCNC: 1.8 MG/DL
MCH RBC QN AUTO: 29.5 PG
MCHC RBC AUTO-ENTMCNC: 33.3 G/DL
MCV RBC AUTO: 89 FL
MONOCYTES # BLD AUTO: 0.9 K/UL
MONOCYTES NFR BLD: 12.3 %
NEUTROPHILS # BLD AUTO: 3.8 K/UL
NEUTROPHILS NFR BLD: 53.8 %
PLATELET # BLD AUTO: 178 K/UL
PMV BLD AUTO: 10 FL
POCT GLUCOSE: 119 MG/DL (ref 70–110)
POCT GLUCOSE: 91 MG/DL (ref 70–110)
POCT GLUCOSE: 93 MG/DL (ref 70–110)
POCT GLUCOSE: 98 MG/DL (ref 70–110)
POTASSIUM SERPL-SCNC: 3.3 MMOL/L
PROT SERPL-MCNC: 6.2 G/DL
PROTHROMBIN TIME: 14.2 SEC
RBC # BLD AUTO: 5.08 M/UL
SODIUM SERPL-SCNC: 140 MMOL/L
WBC # BLD AUTO: 7.08 K/UL

## 2017-08-04 PROCEDURE — 4A023N6 MEASUREMENT OF CARDIAC SAMPLING AND PRESSURE, RIGHT HEART, PERCUTANEOUS APPROACH: ICD-10-PCS | Performed by: INTERNAL MEDICINE

## 2017-08-04 PROCEDURE — 85025 COMPLETE CBC W/AUTO DIFF WBC: CPT

## 2017-08-04 PROCEDURE — 63600175 PHARM REV CODE 636 W HCPCS: Performed by: HOSPITALIST

## 2017-08-04 PROCEDURE — 27000221 HC OXYGEN, UP TO 24 HOURS

## 2017-08-04 PROCEDURE — 25000003 PHARM REV CODE 250: Performed by: INTERNAL MEDICINE

## 2017-08-04 PROCEDURE — 25000242 PHARM REV CODE 250 ALT 637 W/ HCPCS: Performed by: HOSPITALIST

## 2017-08-04 PROCEDURE — 25000003 PHARM REV CODE 250

## 2017-08-04 PROCEDURE — C1894 INTRO/SHEATH, NON-LASER: HCPCS

## 2017-08-04 PROCEDURE — 25000003 PHARM REV CODE 250: Performed by: HOSPITALIST

## 2017-08-04 PROCEDURE — 36415 COLL VENOUS BLD VENIPUNCTURE: CPT

## 2017-08-04 PROCEDURE — 25000003 PHARM REV CODE 250: Performed by: STUDENT IN AN ORGANIZED HEALTH CARE EDUCATION/TRAINING PROGRAM

## 2017-08-04 PROCEDURE — 94664 DEMO&/EVAL PT USE INHALER: CPT

## 2017-08-04 PROCEDURE — 27000173 HC ACAPELLA DEVICE DH OR DM

## 2017-08-04 PROCEDURE — 93451 RIGHT HEART CATH: CPT | Mod: 26,,, | Performed by: INTERNAL MEDICINE

## 2017-08-04 PROCEDURE — 80053 COMPREHEN METABOLIC PANEL: CPT

## 2017-08-04 PROCEDURE — 85610 PROTHROMBIN TIME: CPT

## 2017-08-04 PROCEDURE — C1751 CATH, INF, PER/CENT/MIDLINE: HCPCS

## 2017-08-04 PROCEDURE — 11000001 HC ACUTE MED/SURG PRIVATE ROOM

## 2017-08-04 PROCEDURE — 99232 SBSQ HOSP IP/OBS MODERATE 35: CPT | Mod: GC,,, | Performed by: HOSPITALIST

## 2017-08-04 PROCEDURE — 83735 ASSAY OF MAGNESIUM: CPT

## 2017-08-04 RX ORDER — POTASSIUM CHLORIDE 750 MG/1
50 CAPSULE, EXTENDED RELEASE ORAL ONCE
Status: COMPLETED | OUTPATIENT
Start: 2017-08-04 | End: 2017-08-04

## 2017-08-04 RX ORDER — WARFARIN SODIUM 5 MG/1
5 TABLET ORAL DAILY
Status: DISCONTINUED | OUTPATIENT
Start: 2017-08-04 | End: 2017-08-05

## 2017-08-04 RX ORDER — ENOXAPARIN SODIUM 100 MG/ML
40 INJECTION SUBCUTANEOUS EVERY 24 HOURS
Status: DISCONTINUED | OUTPATIENT
Start: 2017-08-04 | End: 2017-08-04

## 2017-08-04 RX ADMIN — DIGOXIN 0.12 MG: 125 TABLET ORAL at 08:08

## 2017-08-04 RX ADMIN — ALPRAZOLAM 1 MG: 1 TABLET ORAL at 10:08

## 2017-08-04 RX ADMIN — HYDROMORPHONE HYDROCHLORIDE 0.5 MG: 1 INJECTION, SOLUTION INTRAMUSCULAR; INTRAVENOUS; SUBCUTANEOUS at 01:08

## 2017-08-04 RX ADMIN — HYDROMORPHONE HYDROCHLORIDE 0.5 MG: 1 INJECTION, SOLUTION INTRAMUSCULAR; INTRAVENOUS; SUBCUTANEOUS at 02:08

## 2017-08-04 RX ADMIN — WARFARIN SODIUM 5 MG: 5 TABLET ORAL at 05:08

## 2017-08-04 RX ADMIN — INSULIN DETEMIR 15 UNITS: 100 INJECTION, SOLUTION SUBCUTANEOUS at 08:08

## 2017-08-04 RX ADMIN — FLUTICASONE PROPIONATE 2 SPRAY: 50 SPRAY, METERED NASAL at 08:08

## 2017-08-04 RX ADMIN — MAGNESIUM OXIDE TAB 400 MG (241.3 MG ELEMENTAL MG) 400 MG: 400 (241.3 MG) TAB at 08:08

## 2017-08-04 RX ADMIN — HYDROMORPHONE HYDROCHLORIDE 0.5 MG: 1 INJECTION, SOLUTION INTRAMUSCULAR; INTRAVENOUS; SUBCUTANEOUS at 10:08

## 2017-08-04 RX ADMIN — FUROSEMIDE 20 MG/HR: 10 INJECTION, SOLUTION INTRAMUSCULAR; INTRAVENOUS at 04:08

## 2017-08-04 RX ADMIN — TIOTROPIUM BROMIDE 18 MCG: 18 CAPSULE ORAL; RESPIRATORY (INHALATION) at 08:08

## 2017-08-04 RX ADMIN — GABAPENTIN 600 MG: 300 CAPSULE ORAL at 08:08

## 2017-08-04 RX ADMIN — HYDROMORPHONE HYDROCHLORIDE 0.5 MG: 1 INJECTION, SOLUTION INTRAMUSCULAR; INTRAVENOUS; SUBCUTANEOUS at 06:08

## 2017-08-04 RX ADMIN — GUAIFENESIN 1200 MG: 600 TABLET, EXTENDED RELEASE ORAL at 08:08

## 2017-08-04 RX ADMIN — FLUTICASONE FUROATE AND VILANTEROL TRIFENATATE 1 PUFF: 100; 25 POWDER RESPIRATORY (INHALATION) at 08:08

## 2017-08-04 RX ADMIN — ALPRAZOLAM 1 MG: 1 TABLET ORAL at 09:08

## 2017-08-04 RX ADMIN — GABAPENTIN 600 MG: 300 CAPSULE ORAL at 06:08

## 2017-08-04 RX ADMIN — GABAPENTIN 600 MG: 300 CAPSULE ORAL at 02:08

## 2017-08-04 RX ADMIN — ISOSORBIDE MONONITRATE 30 MG: 30 TABLET ORAL at 08:08

## 2017-08-04 RX ADMIN — RANOLAZINE 500 MG: 500 TABLET, FILM COATED, EXTENDED RELEASE ORAL at 08:08

## 2017-08-04 RX ADMIN — FUROSEMIDE 20 MG/HR: 10 INJECTION, SOLUTION INTRAMUSCULAR; INTRAVENOUS at 02:08

## 2017-08-04 RX ADMIN — POTASSIUM CHLORIDE 50 MEQ: 750 CAPSULE, EXTENDED RELEASE ORAL at 07:08

## 2017-08-04 RX ADMIN — OXYCODONE HYDROCHLORIDE AND ACETAMINOPHEN 1 TABLET: 10; 325 TABLET ORAL at 04:08

## 2017-08-04 NOTE — PROGRESS NOTES
IM1 notified of pt sats 82-83 % on 5LNC, pt showing no signs of distress, no SOB and states he feels fine.  MD states to inform him if the begin complaining of SOB or showing signs of distress

## 2017-08-04 NOTE — H&P (VIEW-ONLY)
Heart Failure and Transplant Service Consult Note  Attending Physician: Donnie Rueda MD  Reason for Consult: Pulmonary HTN evaluation     HPI:   62 y.o. gentleman with PMH of HFpEF (EF 50%), COPD on home O2 (4-5L NC), morbid obesity, obesity hypoventilation syndrome (OHS), CAD s/p CABG (2000), s/p PCI (05/2016) on ASA/Plavix, PAD s/p PTCA in 2000 on ASA/pletal, paroxysmal AF s/p DCCV (05/2016), hx of cardiac arrest s/p CPR (2016), Hepatitis C (not on treatment due to lung disease limiting his life expectancy more than hep. C) who presented to [facility] as a transfer from [facility] for management of acute on chronic respiratory failure / pulmonary HTN management.     He presented to [facility] on 7/13 via EMS for worsening hypoxia and altered mental status. Initially admitted to the ICU. Worsening hypoxia was thought to be 2/2 to pulmonary HTN. Bubble study was negative for shunt. Pt was also started on therapeutic Lovenox and was being bridged to warfarin for suspected PE, despite negative CTA. Finished abx treatment for RLL PNA. Diuretics were held 2/2 to hypotension. Pt had a bump in troponin which was deemed to be NSTEMI 2/2 to demand ischemia. His coreg dose was decreased to due underlying lung issues/ hypoxia. He was not treated with steroids due to issues with uncontrolled DM/ hyperglycemia. He was diuresed with lasix 40 PO TID. Transferred to [facility] for evaluation of acute on chronic respiratory failure 2/2 pulmonary HTN.     On arrival, pt was hypoxic on 5 liters of supplemental O2. He reports no symptoms at rest but reports NYHA Class III SOB, no orthopnea (uses 2 pillows for comfort), no PND. Reports weight gain and leg swelling. At home he takes lasix 40 mg PO BID. Reports compliance with medications but not a low salt diet. He claims he uses 2LNC at home but gradually increased to 4-5L NC after seeing his pulmonologist, Dr. Booker.     ROS:    Constitution: Negative for  fever, chills, weight loss or gain.   HENT: Negative for sore throat, rhinorrhea, or headache.  Eyes: Negative for blurred or double vision.   Cardiovascular: See above  Pulmonary: Positive for SOB   Gastrointestinal: Negative for abdominal pain, nausea, vomiting, or diarrhea.   : Negative for dysuria.   Neurological: Negative for focal weakness or sensory changes.  PMH:     Past Medical History:   Diagnosis Date    Angina, class III     Atherosclerosis of lower extremity with claudication     Bilateral carotid artery stenosis     CAD (coronary artery disease)     CAD (coronary artery disease) of artery bypass graft     Chronic hepatitis C     COPD (chronic obstructive pulmonary disease)     Hepatitis     HHD (hypertensive heart disease)     Hypercholesteremia     Hypoxia     Neuropathy, lower extremity     Obstructive sleep apnea     PAF (paroxysmal atrial fibrillation)     Presence of stent in artery     Syncope      Past Surgical History:   Procedure Laterality Date    COLONOSCOPY      CORONARY ARTERY BYPASS GRAFT      stent in artery      UPPER GASTROINTESTINAL ENDOSCOPY       Allergies:   Review of patient's allergies indicates:  No Known Allergies  Medications:     Current Facility-Administered Medications on File Prior to Encounter   Medication Dose Route Frequency Provider Last Rate Last Dose    [DISCONTINUED] acetaminophen tablet 650 mg  650 mg Oral Q6H PRN Vj Rodriguez MD   650 mg at 07/26/17 1025    [DISCONTINUED] albuterol-ipratropium 2.5mg-0.5mg/3mL nebulizer solution 3 mL  3 mL Nebulization Q6H Indra Booker DO   3 mL at 07/27/17 1828    [DISCONTINUED] alprazolam tablet 1 mg  1 mg Oral BID PRN Lurdes Bell NP   1 mg at 07/27/17 2022    [DISCONTINUED] aspirin chewable tablet 81 mg  81 mg Oral Daily Kenrick Reddy MD   81 mg at 07/27/17 0943    [DISCONTINUED] atorvastatin tablet 80 mg  80 mg Oral Daily Kenrick Reddy MD   80 mg at 07/27/17 0943     [DISCONTINUED] budesonide nebulizer solution 0.5 mg  0.5 mg Nebulization Q12H Indra Booker DO   0.5 mg at 07/27/17 1830    [DISCONTINUED] dextrose 50% injection 12.5 g  12.5 g Intravenous PRN Indra Booker DO        [DISCONTINUED] dextrose 50% injection 25 g  25 g Intravenous PRN Indra Booker DO        [DISCONTINUED] enoxaparin injection 130 mg  1 mg/kg Subcutaneous Q12H Indra Booker DO   130 mg at 07/27/17 1127    [DISCONTINUED] furosemide injection 40 mg  40 mg Intravenous BID Tan Robb MD   40 mg at 07/27/17 1736    [DISCONTINUED] gabapentin capsule 600 mg  600 mg Oral TID Vj Rodriguez MD   600 mg at 07/27/17 2009    [DISCONTINUED] glucagon (human recombinant) injection 1 mg  1 mg Intramuscular PRN Indra Booker DO        [DISCONTINUED] glucose chewable tablet 16 g  16 g Oral PRN Indra Booker DO        [DISCONTINUED] glucose chewable tablet 24 g  24 g Oral PRN Indra Booker DO        [DISCONTINUED] guaifenesin 12 hr tablet 1,200 mg  1,200 mg Oral BID Indra Booker DO   1,200 mg at 07/27/17 2009    [DISCONTINUED] hydrocodone-acetaminophen 10-325mg per tablet 1 tablet  1 tablet Oral Q6H PRN Kenrick Reddy MD   1 tablet at 07/27/17 2147    [DISCONTINUED] insulin aspart injection 1-10 Units  1-10 Units Subcutaneous QID (AC + HS) PRN Indra Booker DO   2 Units at 07/27/17 2057    [DISCONTINUED] insulin detemir injection 17 Units  17 Units Subcutaneous BID Tan Robb MD   17 Units at 07/27/17 2022    [DISCONTINUED] isosorbide mononitrate 24 hr tablet 30 mg  30 mg Oral Daily Vj Rodriguez MD   30 mg at 07/27/17 0944    [DISCONTINUED] L.acidophil,parac-S.therm-Bif. (Risaquad) Cap 1 capsule  1 capsule Oral Daily Indra Booker DO   1 capsule at 07/27/17 0944    [DISCONTINUED] lidocaine 5 % patch 1 patch  1 patch Transdermal Q24H Indra Booker DO   1 patch at 07/27/17 1412    [DISCONTINUED] magnesium oxide tablet 400 mg  400 mg Oral BID Archana Worley MD    400 mg at 07/27/17 2100    [DISCONTINUED] methylPREDNISolone sodium succinate injection 40 mg  40 mg Intravenous Q6H Indra Booker, DO   40 mg at 07/27/17 1736    [DISCONTINUED] morphine injection 8 mg  8 mg Intravenous Q6H PRN Vj Rodriguez MD   8 mg at 07/25/17 1727    [DISCONTINUED] pantoprazole EC tablet 40 mg  40 mg Oral Daily Kenrick Reddy MD   40 mg at 07/27/17 0944    [DISCONTINUED] polyethylene glycol packet 17 g  17 g Oral Daily Emma Greco NP   17 g at 07/26/17 0912    [DISCONTINUED] ranolazine 12 hr tablet 500 mg  500 mg Oral BID Kenrick Reddy MD   500 mg at 07/27/17 2009    [DISCONTINUED] spironolactone tablet 25 mg  25 mg Oral Daily Kenrick Reddy MD   25 mg at 07/25/17 0824    [DISCONTINUED] tamsulosin 24 hr capsule 0.4 mg  0.4 mg Oral Daily Kenrick Reddy MD   0.4 mg at 07/27/17 0945    [DISCONTINUED] tizanidine tablet 4 mg  4 mg Oral Nightly PRN Indra Booker DO   4 mg at 07/26/17 2039    [DISCONTINUED] warfarin (COUMADIN) tablet 5 mg  5 mg Oral Daily Indra Booker DO   5 mg at 07/27/17 1657     Current Outpatient Prescriptions on File Prior to Encounter   Medication Sig Dispense Refill    ADVAIR DISKUS 250-50 mcg/dose diskus inhaler Inhale 1 puff into the lungs 2 (two) times daily.      albuterol (PROAIR HFA) 90 mcg/actuation inhaler Inhale 1 puff into the lungs 2 (two) times daily. Rescue      albuterol (PROVENTIL) 5 mg/mL nebulizer solution Take 2.5 mg by nebulization 3 (three) times daily. Rescue      alprazolam (XANAX) 1 MG tablet Take 1 mg by mouth 2 (two) times daily as needed for Anxiety.       amiodarone (PACERONE) 100 MG Tab Take 1 tablet (100 mg total) by mouth once daily.      aspirin 81 MG Chew Take 81 mg by mouth once daily.      atorvastatin (LIPITOR) 40 MG tablet Take 2 tablets (80 mg total) by mouth once daily.      carvedilol (COREG) 3.125 MG tablet Take 3.125 mg by mouth 2 (two) times daily.      cholecalciferol, vitamin  D3, (VITAMIN D3) 400 unit Chew Take 400 Units by mouth once daily.      cilostazol (PLETAL) 100 MG Tab Take 100 mg by mouth 2 (two) times daily.       clopidogrel (PLAVIX) 75 mg tablet Take 75 mg by mouth once daily.       fish oil-omega-3 fatty acids 300-1,000 mg capsule Take 1 capsule by mouth 2 (two) times daily.       furosemide (LASIX) 40 MG tablet Take 1 tablet by mouth 2 (two) times daily.      gabapentin (NEURONTIN) 300 MG capsule Take 3 capsules (900 mg total) by mouth every evening.      hydrocodone-acetaminophen 10-325mg (NORCO)  mg Tab Take 1 tablet by mouth 2 (two) times daily as needed.       insulin detemir (LEVEMIR) 100 unit/mL injection Inject 15 Units into the skin 2 (two) times daily. 9 mL 11    isosorbide mononitrate (IMDUR) 30 MG 24 hr tablet Take 1 tablet (30 mg total) by mouth once daily. 30 tablet 11    nitroGLYCERIN (NITROSTAT) 0.4 MG SL tablet Place 0.4 mg under the tongue every 5 (five) minutes as needed for Chest pain.      pantoprazole (PROTONIX) 40 MG tablet 40 mg 2 (two) times daily.       ranolazine (RANEXA) 500 MG Tb12 Take 1 tablet (500 mg total) by mouth 2 (two) times daily.      SPIRIVA RESPIMAT 2.5 mcg/actuation Mist Inhale 2 puffs into the lungs once daily at 6am.      spironolactone (ALDACTONE) 25 MG tablet Take 1 tablet (25 mg total) by mouth once daily.      sucralfate (CARAFATE) 1 gram tablet Take 1 tablet by mouth 4 (four) times daily before meals and nightly.      tamsulosin (FLOMAX) 0.4 mg Cp24 Take 1 capsule (0.4 mg total) by mouth once daily.      tizanidine (ZANAFLEX) 4 MG tablet Take 6 mg by mouth every 8 (eight) hours. Take one and one half tablet      warfarin (COUMADIN) 5 MG tablet Take 1 tablet (5 mg total) by mouth Daily. 30 tablet 11       Inpatient Medications   Continuous Infusions:   Scheduled Meds:   albuterol-ipratropium 2.5mg-0.5mg/3mL  3 mL Nebulization Q6H WAKE    aspirin  81 mg Oral Daily    atorvastatin  80 mg Oral Daily     "budesonide  0.5 mg Nebulization Q12H    digoxin  0.125 mg Oral Daily    enoxaparin  40 mg Subcutaneous Q12H    furosemide  40 mg Intravenous BID    gabapentin  600 mg Oral TID    guaifenesin  1,200 mg Oral BID    [START ON 7/29/2017] insulin detemir  17 Units Subcutaneous BID    isosorbide mononitrate  30 mg Oral Daily    lidocaine  1 patch Transdermal Q24H    magnesium oxide  400 mg Oral BID    pantoprazole  40 mg Oral Daily    polyethylene glycol  17 g Oral Daily    ranolazine  500 mg Oral BID    tamsulosin  0.4 mg Oral Daily     PRN Meds:acetaminophen, alprazolam, dextrose 50%, dextrose 50%, glucagon (human recombinant), glucose, glucose, hydrocodone-acetaminophen 10-325mg, insulin aspart, nitroGLYCERIN, tizanidine     Social History:     Social History   Substance Use Topics    Smoking status: Former Smoker     Packs/day: 4.00     Years: 40.00     Types: Cigarettes     Start date: 1968     Quit date: 6/11/2009    Smokeless tobacco: Never Used    Alcohol use No     Family History:     Family History   Problem Relation Age of Onset    Liver disease Father     Liver disease Sister      Physical Exam:     Vitals:  Temp:  [97.5 °F (36.4 °C)-98.6 °F (37 °C)]   Pulse:  [62-99]   Resp:  [18-23]   BP: (105-135)/(60-75)   SpO2:  [86 %-93 %]     /73 (BP Location: Left arm, Patient Position: Lying, BP Method: Automatic)   Pulse 62   Temp 97.7 °F (36.5 °C) (Oral)   Resp 20   Ht 5' 11" (1.803 m)   Wt 132.4 kg (291 lb 14.2 oz)   SpO2 (!) 88%   BMI 40.71 kg/m²   I/O's:    Intake/Output Summary (Last 24 hours) at 07/28/17 1821  Last data filed at 07/28/17 1400   Gross per 24 hour   Intake               50 ml   Output             1190 ml   Net            -1140 ml       Wt Readings from Last 10 Encounters:   07/28/17 132.4 kg (291 lb 14.2 oz)   07/27/17 129.9 kg (286 lb 4.8 oz)   06/13/17 129.3 kg (285 lb)   05/10/17 134.2 kg (295 lb 13.7 oz)   04/13/17 135.2 kg (298 lb)   05/13/15 133.4 kg (294 lb) "   05/13/15 133.5 kg (294 lb 4.8 oz)   02/11/15 134.7 kg (297 lb)   02/11/15 134.7 kg (297 lb)        Constitutional: NAD, conversant  HEENT: Sclera anicteric, PERRLA, EOMI  Neck: Unable to visualize JVD, no carotid bruits  CV: RRR, no murmur, normal S1/S2, +S3, +S4, No Pericardial rub  Pulm: coarse breath sounds, decreased air entry  GI: Abdomen soft, NTND, +BS  Extremities: 2+ LE edema, warm and well perfused, No cyanosis, No clubbing  Skin: No ecchymosis, erythema, or ulcers  Psych: AOx3, appropriate affect  Neuro: CNII-XII intact, no focal deficits      Labs:       Recent Labs  Lab 07/27/17  0504 07/28/17 0106 07/28/17 0527     --  141 142   K 4.9  --  4.1 5.1   CL 95  --  101 101   CO2 32*  --  29 33*   BUN 25*  --  26* 27*   CREATININE 1.10  --  1.2 1.1   ANIONGAP  --   < > 11 8   < > = values in this interval not displayed.    Recent Labs  Lab 07/27/17  0504 07/28/17 0106 07/28/17 0527   AST 27 16 18   ALT 40 26 26   ALKPHOS 77 86 75   BILITOT 1.6* 1.6* 1.4*   ALBUMIN 3.5 3.0* 2.9*     No results for input(s): TROPONINI, BNP in the last 168 hours.  No results for input(s): PH, PCO2, PO2, HCO3, POCSATURATED in the last 168 hours.   Recent Labs  Lab 07/27/17  0504 07/28/17 0106 07/28/17 0527   WBC 7.00 9.72 10.08   HGB 13.9* 14.9 14.5   HCT 41.7 45.8 45.0    218 200   GRAN 84.8*  6.0 81.6*  7.9* 77.8*  7.8*       Recent Labs  Lab 07/28/17 0527   INR 1.3*     No results found for: CHOL, HDL, LDLCALC, TRIG  Lab Results   Component Value Date    HGBA1C 13.1 (H) 07/14/2017    TSH 0.814 02/11/2015        Micro:  Blood Cultures  No results found for: LABBLOO  Urine Cultures  Lab Results   Component Value Date    LABURIN No significant growth 07/13/2017       Imaging:   CXR (7/23/17): Prior cardiac surgery and stable mild cardiomegaly.  Atherosclerotic changes of the aorta.      Chronic lung changes and mild vascular congestion.  No focal infiltrate or effusion.      Degenerative changes of the  spine.    CTA Chest (7/24/17):  1.  No CT evidence of central pulmonary embolus.  Evaluation is technically limited due to patient motion artifact.  2.  Cardiomegaly and vascular congestion.  Minimal basilar compressive atelectasis and trace effusions.  Stable subcentimeter pulmonary nodules.  3.  Stable moderately prominent mediastinal lymph nodes similar to the prior study of 05/08/2017.    TTE (7/25/17):  1. The study quality is below average.   2. Global left ventricular systolic function is borderline. The left   ventricular ejection fraction is 50%.    3. Left ventricular diastolic function is indeterminate.   4. Right ventricular systolic function is mildly decreased. Right   ventricular diastolic dimension is 4.6 cms.    5. Mild calcification of the aortic valve is noted with adequate   cuspal excursion.   6. The left atrium is moderately enlarged. Left atrial diameter is 4.7   cms. The right atrium is mildly enlarged. Right atrial diameter is 4.8   cms.    7. Trace pulmonic regurgitation. Mild (1+) mitral regurgitation.   Moderate (2+) tricuspid regurgitation.   8. The pulmonary artery systolic pressure is 60 mmHg. Evidence of   pulmonary hypertension is noted, but below average study.  Septum in   some views appears to be paradoxical that would support pulmonary   hypertension     Intra-modality comparison (Echocardiogram)  This echocardiogram when compared with the latest echocardiogram-TTE   ([facility] [place]) dated 8/9/2016 shows:    1. Ejection fraction essentially remained unchanged (45% previous   study, 50% current study).    2. Left atrium size changed from severely increased to moderately   increased and right atrium size changed from normal to mildly   increased is noted in the current study.    EF   Date Value Ref Range Status   03/13/2015 65 55 - 65        EKG: Sinus hector, rate 59 BPM, RVH, poor R wave progression suggesting underlying lung pathology      Assessment:   62 y.o. gentleman with PMH of  HFpEF (EF 50%), COPD on home O2 (4-5L NC), morbid obesity, obesity hypoventilation syndrome (OHS), CAD s/p CABG (2000), s/p PCI (05/2016) on ASA/Plavix, PAD s/p PTCA in 2000 on ASA/pletal, paroxysmal AF s/p DCCV (05/2016), hx of cardiac arrest s/p CPR (2016), Hepatitis C (not on treatment due to lung disease limiting his life expectancy more than hep. C) who presented to [facility] [place] as a transfer from [facility] for management of acute on chronic respiratory failure / pulmonary HTN management.     Plan:   Pulmonary HTN - multifactorial - WHO II/III (ADHF/COPD)  - recommend IV diureses with lasix 80 mg IV BID  - management of COPD/HATTIE/OHS as per pulm recs  - daily STANDING weights, strict I/Os, 2 gm Na diet, 1500 mL fluid restriction  - TTE w/ CFD on Monday after being aggressively diuresed  - monitor renal function daily, keep K>4.0, Mg>2.0  - CTA chest negative for PE  - known hx of COPD/HATTIE based on prior spirometry/PSG  - check BNP, CXR  - weight 285 (6/2017) - now 291 lbs    CAD s/p CABG (2000), s/p PCI (05/2016)  - c/w ASA and plavix    PAD   - c/w ASA, pletal    Paroxysmal AF  - [id number] - 4  - c/w amiodarone  - INR subtherapeutic - will need therapeutic AC - given use of ASA, Plavix, pletal, and Warfarin - would defer holding the plavix to the patient's primary cardiologist given >1 year since PCI    Continue to diurese over the weekend. We will follow up on Monday after TTE performed. Discussed with Dr. Bhatt.    Signed:  Stephon Lopez MD  Cardiology Fellow  Pager: [phone]  7/28/2017 6:21 PM

## 2017-08-04 NOTE — PLAN OF CARE
Problem: Patient Care Overview  Goal: Plan of Care Review  Outcome: Ongoing (interventions implemented as appropriate)  POC reviewed/explained with pt and spouse. Pt verbalizes understanding of care. VSS. AAOx4. Pain management controlled with PRN meds. Continuous Lasix @  20ml/hr maintained throughout. Excellent Output (per flowsheet for accurate I/Os).  No acute events. Will continue to monitor and reassess.     Problem: Fall Risk (Adult)  Goal: Identify Related Risk Factors and Signs and Symptoms  Related risk factors and signs and symptoms are identified upon initiation of Human Response Clinical Practice Guideline (CPG)   Outcome: Ongoing (interventions implemented as appropriate)  Safety precautions maintained. Remained free of falls/injury/trauma. Fall risk reviewed, pt verbalizes understanding.

## 2017-08-04 NOTE — SUBJECTIVE & OBJECTIVE
Interval History: NAEON. Reports stable breathing, edema continues to improve. Scheduled for RHC today. Maintaining good UOP, 3270cc in last 24 hrs (-16.5L since admission).    Review of Systems  Objective:     Vital Signs (Most Recent):  Temp: 98.3 °F (36.8 °C) (08/04/17 1247)  Pulse: 81 (08/04/17 1247)  Resp: 20 (08/04/17 1247)  BP: 114/63 (08/04/17 1247)  SpO2: (!) 83 % (08/04/17 1300) Vital Signs (24h Range):  Temp:  [97.2 °F (36.2 °C)-98.4 °F (36.9 °C)] 98.3 °F (36.8 °C)  Pulse:  [69-82] 81  Resp:  [16-20] 20  SpO2:  [82 %-93 %] 83 %  BP: (110-122)/(56-67) 114/63     Weight: 114.4 kg (252 lb 3.2 oz)  Body mass index is 35.17 kg/m².    Intake/Output Summary (Last 24 hours) at 08/04/17 1341  Last data filed at 08/04/17 0410   Gross per 24 hour   Intake              480 ml   Output             2370 ml   Net            -1890 ml      Physical Exam   Constitutional: He is oriented to person, place, and time. He appears well-developed and well-nourished. No distress.   Eyes: EOM are normal.   Neck: Neck supple.   Cardiovascular: Normal rate, regular rhythm, normal heart sounds and intact distal pulses.    Pulmonary/Chest: Effort normal. No respiratory distress. He has no wheezes.   Decreased BS bilateral bases   Abdominal: Soft. Bowel sounds are normal. He exhibits distension. There is no tenderness.   Musculoskeletal: He exhibits edema (2+ LE edema bilaterally).   Edema improving   Neurological: He is alert and oriented to person, place, and time. No cranial nerve deficit.   Skin: Skin is warm and dry. Capillary refill takes less than 2 seconds.       Significant Labs:   CBC:   Recent Labs  Lab 08/04/17  0426   WBC 7.08   HGB 15.0   HCT 45.0        CMP:   Recent Labs  Lab 08/03/17  0435 08/04/17  0426    140   K 3.3* 3.3*   CL 99 100   CO2 30* 28   GLU 81 97   BUN 15 17   CREATININE 1.1 1.1   CALCIUM 8.6* 8.6*   PROT 6.4 6.2   ALBUMIN 3.1* 3.0*   BILITOT 3.1* 3.1*   ALKPHOS 81 85   AST 46* 41*   ALT 61*  55*   ANIONGAP 12 12   EGFRNONAA >60.0 >60.0     Coagulation:   Recent Labs  Lab 08/04/17  0426   INR 1.4*     POCT Glucose:   Recent Labs  Lab 08/03/17  2103 08/04/17  0753 08/04/17  1042   POCTGLUCOSE 134* 91 98     All pertinent labs within the past 24 hours have been reviewed.    Significant Imaging: I have reviewed all pertinent imaging results/findings within the past 24 hours.

## 2017-08-04 NOTE — PROGRESS NOTES
Patient tolerated the procedure well. Please see complete RHC procedure note for full details and results. Stable to return from cath lab to their hospital room.  Procedure: Right heart catheterization   Procedure date: 08/04/17    Discharge date: 08/04/17  Estimated blood loss: less than 10 cc  Complications: none  Condition at discharge: stable  Discharge instructions: no heavy lifting greater than 5 lbs and no bearing down/coughing without holding pressure over incision site.  Activity: as tolerated after first 24 hours of following discharge instructions.   Diet: As tolerated and as ordered previously.

## 2017-08-04 NOTE — PLAN OF CARE
Problem: Patient Care Overview  Goal: Plan of Care Review  Outcome: Ongoing (interventions implemented as appropriate)  Pt is AAOx4 and able to make needs known. Respirations regular and unlabored on O2 at 5L via nasal canula, no SOB reported. Pt had the procedure today, R IJ site  dressing is clean dry and intact. Safety precautions maintained,wife at bedside. Will continue to monitor.

## 2017-08-04 NOTE — ASSESSMENT & PLAN NOTE
Patient seen by hepatology at [facility] and in discussion regarding treatment.  Appears that his lung disease is the ultimately limiting factor for treatment (his concerns regarding current treatments and effects on pulmonary status, as well as high resistance to of hepC strain to currently available regimens).  Patient scheduled to f/u with Hepatology August 2017 with Dr. Lopez.

## 2017-08-04 NOTE — ASSESSMENT & PLAN NOTE
- multifactorial with pulm HTN, RH failure, COPD, HATTIE, OHS  - Hypoxic at baseline w/ chronic supplemental O2 requirement (4-5 liters) and Trilogy at night, transferred for pulm-HTN eval.   - Continued guaifenesin, DuoNeb q6 and Budesonide q12.  - repeat ECHO showed normal EF and elevated estimated PASP, but was unable to accurately determine PA pressure due to inadequate TR jet.  - pulmonology and HTS consulted, appreciate recs  - increased lasix gtt from 10--> 20 mg/hr   - plan for RHC today, will follow up cardiology recs following procedure

## 2017-08-04 NOTE — PROGRESS NOTES
[facility] Medicine  Progress Note    Patient Name: Shaan Brown  MRN: [id number]  Patient Class: IP- Inpatient   Admission Date: 7/27/2017  Length of Stay: 8 days  Attending Physician: Donnie Rueda MD  Primary Care Provider: Mendel Ferguson Iii, MD    [facility] Medicine Team: [facility] MED 1 Riley Castellano MD    Subjective:     Principal Problem:Acute on chronic respiratory failure with hypoxia and hypercapnia    HPI:  Shaan Brown is a 62-year-old male w/ chronic mixed hypercapnia and hypoxemia 2/2 COPD, HFpEF, HATTIE and obesity hypoventilation who's on 4-5 liters of continuous supplemental O2 (SpO2 typically in the low 90s-80s)  w/ Trilogy at night. He presented to [facility] on 7/13 via EMS for worsening hypoxia and altered mental status. Initially admitted to the ICU. Worsening hypoxia was thought to be 2/2 to pulmonary HTN w/ PA pressures of 71. Bubble study was negative for shunt. Pt was also started on therapeutic Lovenox and was being bridged to warfarin for suspected PE, despite negative CTA. Finished abx treatment for RLL PNA. Diuretics were held 2/2 to hypotension. Pt has what looks to be a NSTEMI 2/2 to demand, however patient was not tolerate of beta-blocker therapy 2/2 to hypoxia. No improvement in hypoxia despite steroids. Transferred to [facility] for evaluation by pulmonology for pulmonary HTN.   On arrive, pt was hypoxic on 5 liters of supplemental O2. He was complaining of lower back pain.     Hospital Course:  Transferred from [facility] to [facility] IM-1 service for evaluation of worsening pulmonary HTN. He was continued on scheduled duonebs, budesonide full dose lovenox. Diuresis with lasix 40 IV BID was started. Pulmonology was consulted and recommended continued diuresis and aerosol treatments, heart transplant consult for PHtn, and stopping full dose AC. Heart transplant was consulted and recommended repeating the ECHO following  aggressive diuresis. Pt c/o L chest wall pain that he reports began after he fell onto an O2 container. On review of CTA from 7/24, pt has multiple fractured ribs on L side; PRN pain medication ordered. He also reported episodes of chest pain during this admission which were consistent with previous episodes of angina; workup not concerning for infarction. Lasix IVP switched to lasix gtt to improve diuresis. ECHO 7/31 showed normal EF and elevated estimated PASP, but was unable to accurately determine PA pressure due to inadequate TR jet. Continued on lasix gtt; rate increased from 10 cc/hr to 20cc/hr for more adequate diuresis.     Interval History: NAEON. Reports stable breathing, edema continues to improve. Scheduled for RHC today. Maintaining good UOP, 3270cc in last 24 hrs (-16.5L since admission).    Review of Systems  Objective:     Vital Signs (Most Recent):  Temp: 98.3 °F (36.8 °C) (08/04/17 1247)  Pulse: 81 (08/04/17 1247)  Resp: 20 (08/04/17 1247)  BP: 114/63 (08/04/17 1247)  SpO2: (!) 83 % (08/04/17 1300) Vital Signs (24h Range):  Temp:  [97.2 °F (36.2 °C)-98.4 °F (36.9 °C)] 98.3 °F (36.8 °C)  Pulse:  [69-82] 81  Resp:  [16-20] 20  SpO2:  [82 %-93 %] 83 %  BP: (110-122)/(56-67) 114/63     Weight: 114.4 kg (252 lb 3.2 oz)  Body mass index is 35.17 kg/m².    Intake/Output Summary (Last 24 hours) at 08/04/17 1341  Last data filed at 08/04/17 0410   Gross per 24 hour   Intake              480 ml   Output             2370 ml   Net            -1890 ml      Physical Exam   Constitutional: He is oriented to person, place, and time. He appears well-developed and well-nourished. No distress.   Eyes: EOM are normal.   Neck: Neck supple.   Cardiovascular: Normal rate, regular rhythm, normal heart sounds and intact distal pulses.    Pulmonary/Chest: Effort normal. No respiratory distress. He has no wheezes.   Decreased BS bilateral bases   Abdominal: Soft. Bowel sounds are normal. He exhibits distension. There is no  tenderness.   Musculoskeletal: He exhibits edema (2+ LE edema bilaterally).   Edema improving   Neurological: He is alert and oriented to person, place, and time. No cranial nerve deficit.   Skin: Skin is warm and dry. Capillary refill takes less than 2 seconds.       Significant Labs:   CBC:   Recent Labs  Lab 08/04/17  0426   WBC 7.08   HGB 15.0   HCT 45.0        CMP:   Recent Labs  Lab 08/03/17  0435 08/04/17  0426    140   K 3.3* 3.3*   CL 99 100   CO2 30* 28   GLU 81 97   BUN 15 17   CREATININE 1.1 1.1   CALCIUM 8.6* 8.6*   PROT 6.4 6.2   ALBUMIN 3.1* 3.0*   BILITOT 3.1* 3.1*   ALKPHOS 81 85   AST 46* 41*   ALT 61* 55*   ANIONGAP 12 12   EGFRNONAA >60.0 >60.0     Coagulation:   Recent Labs  Lab 08/04/17  0426   INR 1.4*     POCT Glucose:   Recent Labs  Lab 08/03/17  2103 08/04/17  0753 08/04/17  1042   POCTGLUCOSE 134* 91 98     All pertinent labs within the past 24 hours have been reviewed.    Significant Imaging: I have reviewed all pertinent imaging results/findings within the past 24 hours.    Assessment/Plan:      * Acute on chronic respiratory failure with hypoxia and hypercapnia    - multifactorial with pulm HTN, RH failure, COPD, HATTIE, OHS  - Hypoxic at baseline w/ chronic supplemental O2 requirement (4-5 liters) and Trilogy at night, transferred for pulm-HTN eval.   - Continued guaifenesin, DuoNeb q6 and Budesonide q12.  - repeat ECHO showed normal EF and elevated estimated PASP, but was unable to accurately determine PA pressure due to inadequate TR jet.  - pulmonology and HTS consulted, appreciate recs  - increased lasix gtt from 10--> 20 mg/hr   - plan for RHC today, will follow up cardiology recs following procedure        Coronary artery disease involving native coronary artery of native heart without angina pectoris    - Troponemia prior to transfer thought to be 2/2 to demand ischemia  - cont isosorbide mononitrate, ranolazine, ASA and atorvastatin.   - pt reports stent placement in  5/2016. Will obtain outside records to determine the need for continuing plavix  - has had chest pain during this admission c/w previous angina but may be 2/2 rib fractures; SL nitroglycerin PRN        Chronic obstructive pulmonary disease with acute exacerbation    - Spiriva  - breo inhaler  - Duo-nebs prn  - may likely need PFTs to assess for severity of COPD - possibly as outpatient post RHC        Paroxysmal atrial fibrillation    - CHADS VASC 3  - Continue home  digoxin   - not currently receiving amiodarone - currently in sinus rhythm, will reassess amiodarone necessity   - holding home warfarin as would like INR < 2 prior to RHC on 8/4  - will consider starting patient on a NOAC/ DOAC instead of warfarin         Type 2 diabetes mellitus with hyperglycemia    - Detemir to 15 units BID w/ SSI - adequate control         Peripheral vascular disease    - continuing ASA and atorvastatin         HATTIE on CPAP    BiPAP QHS.        Morbid obesity with alveolar hypoventilation    BiPAP QHS.         Chronic hepatitis C    Patient seen by hepatology at [facility] and in discussion regarding treatment.  Appears that his lung disease is the ultimately limiting factor for treatment (his concerns regarding current treatments and effects on pulmonary status, as well as high resistance to of hepC strain to currently available regimens).  Patient scheduled to f/u with Hepatology August 2017 with Dr. Lopez.         Rib fractures    - pt with multiple L rib fractures which can be seen on CTA 7/24; pt reports falling on O2 tank  - percocet 10 and dilaudid 0.5 PRN pain        Pulmonary HTN    - as above  - RHC on 8/4        Obesity due to excess calories    - 1200 demetria diabetic diet          VTE Risk Mitigation         Ordered     enoxaparin injection 40 mg  Daily     Route:  Subcutaneous        08/04/17 0949              Riley Castellano MD  Department of Hospital Medicine   [facility]

## 2017-08-04 NOTE — INTERVAL H&P NOTE
Patient presents for RHC to evaluate filling pressures and cardiac flows. No significant interval change in H&P from 07/28/17. Patient has been diuresing aggressively since consult placed. I have explained the risks, benefits, and alternatives of the procedure in detail.  The patient voices understanding and all questions have been answered.  The patient agrees to proceed as planned.

## 2017-08-05 LAB
ALBUMIN SERPL BCP-MCNC: 3 G/DL
ALP SERPL-CCNC: 87 U/L
ALT SERPL W/O P-5'-P-CCNC: 51 U/L
ANION GAP SERPL CALC-SCNC: 10 MMOL/L
AST SERPL-CCNC: 34 U/L
BILIRUB SERPL-MCNC: 3.1 MG/DL
BUN SERPL-MCNC: 18 MG/DL
CALCIUM SERPL-MCNC: 8.5 MG/DL
CHLORIDE SERPL-SCNC: 101 MMOL/L
CO2 SERPL-SCNC: 29 MMOL/L
CREAT SERPL-MCNC: 0.9 MG/DL
EST. GFR  (AFRICAN AMERICAN): >60 ML/MIN/1.73 M^2
EST. GFR  (NON AFRICAN AMERICAN): >60 ML/MIN/1.73 M^2
GLUCOSE SERPL-MCNC: 106 MG/DL
INR PPP: 1.1
MAGNESIUM SERPL-MCNC: 1.7 MG/DL
POCT GLUCOSE: 104 MG/DL (ref 70–110)
POCT GLUCOSE: 108 MG/DL (ref 70–110)
POCT GLUCOSE: 128 MG/DL (ref 70–110)
POCT GLUCOSE: 158 MG/DL (ref 70–110)
POTASSIUM SERPL-SCNC: 3.2 MMOL/L
PROT SERPL-MCNC: 6.2 G/DL
PROTHROMBIN TIME: 11.6 SEC
SODIUM SERPL-SCNC: 140 MMOL/L

## 2017-08-05 PROCEDURE — 83735 ASSAY OF MAGNESIUM: CPT

## 2017-08-05 PROCEDURE — 99232 SBSQ HOSP IP/OBS MODERATE 35: CPT | Mod: GC,,, | Performed by: HOSPITALIST

## 2017-08-05 PROCEDURE — 63600175 PHARM REV CODE 636 W HCPCS: Performed by: STUDENT IN AN ORGANIZED HEALTH CARE EDUCATION/TRAINING PROGRAM

## 2017-08-05 PROCEDURE — 25000003 PHARM REV CODE 250: Performed by: INTERNAL MEDICINE

## 2017-08-05 PROCEDURE — 25000003 PHARM REV CODE 250: Performed by: HOSPITALIST

## 2017-08-05 PROCEDURE — 11000001 HC ACUTE MED/SURG PRIVATE ROOM

## 2017-08-05 PROCEDURE — 27000221 HC OXYGEN, UP TO 24 HOURS

## 2017-08-05 PROCEDURE — 25000003 PHARM REV CODE 250: Performed by: STUDENT IN AN ORGANIZED HEALTH CARE EDUCATION/TRAINING PROGRAM

## 2017-08-05 PROCEDURE — 25000242 PHARM REV CODE 250 ALT 637 W/ HCPCS: Performed by: HOSPITALIST

## 2017-08-05 PROCEDURE — 27000173 HC ACAPELLA DEVICE DH OR DM

## 2017-08-05 PROCEDURE — 94664 DEMO&/EVAL PT USE INHALER: CPT

## 2017-08-05 PROCEDURE — 80053 COMPREHEN METABOLIC PANEL: CPT

## 2017-08-05 PROCEDURE — 85610 PROTHROMBIN TIME: CPT

## 2017-08-05 PROCEDURE — 36415 COLL VENOUS BLD VENIPUNCTURE: CPT

## 2017-08-05 PROCEDURE — 63600175 PHARM REV CODE 636 W HCPCS: Performed by: HOSPITALIST

## 2017-08-05 RX ORDER — HYDROMORPHONE HYDROCHLORIDE 1 MG/ML
0.5 INJECTION, SOLUTION INTRAMUSCULAR; INTRAVENOUS; SUBCUTANEOUS ONCE
Status: COMPLETED | OUTPATIENT
Start: 2017-08-05 | End: 2017-08-05

## 2017-08-05 RX ORDER — HYDROXYZINE HYDROCHLORIDE 25 MG/1
25 TABLET, FILM COATED ORAL 2 TIMES DAILY PRN
Status: DISCONTINUED | OUTPATIENT
Start: 2017-08-05 | End: 2017-08-09 | Stop reason: HOSPADM

## 2017-08-05 RX ORDER — OXYCODONE HYDROCHLORIDE 5 MG/1
5 TABLET ORAL EVERY 4 HOURS PRN
Status: DISCONTINUED | OUTPATIENT
Start: 2017-08-05 | End: 2017-08-09 | Stop reason: HOSPADM

## 2017-08-05 RX ORDER — OXYCODONE HYDROCHLORIDE 5 MG/1
10 TABLET ORAL EVERY 4 HOURS PRN
Status: DISCONTINUED | OUTPATIENT
Start: 2017-08-05 | End: 2017-08-09 | Stop reason: HOSPADM

## 2017-08-05 RX ORDER — GUAIFENESIN 600 MG/1
1200 TABLET, EXTENDED RELEASE ORAL 2 TIMES DAILY PRN
Status: DISCONTINUED | OUTPATIENT
Start: 2017-08-05 | End: 2017-08-09 | Stop reason: HOSPADM

## 2017-08-05 RX ORDER — POTASSIUM CHLORIDE 750 MG/1
30 CAPSULE, EXTENDED RELEASE ORAL
Status: COMPLETED | OUTPATIENT
Start: 2017-08-05 | End: 2017-08-05

## 2017-08-05 RX ADMIN — TIOTROPIUM BROMIDE 18 MCG: 18 CAPSULE ORAL; RESPIRATORY (INHALATION) at 09:08

## 2017-08-05 RX ADMIN — RANOLAZINE 500 MG: 500 TABLET, FILM COATED, EXTENDED RELEASE ORAL at 09:08

## 2017-08-05 RX ADMIN — FUROSEMIDE 20 MG/HR: 10 INJECTION, SOLUTION INTRAMUSCULAR; INTRAVENOUS at 04:08

## 2017-08-05 RX ADMIN — MAGNESIUM OXIDE TAB 400 MG (241.3 MG ELEMENTAL MG) 400 MG: 400 (241.3 MG) TAB at 09:08

## 2017-08-05 RX ADMIN — POTASSIUM CHLORIDE 30 MEQ: 750 CAPSULE, EXTENDED RELEASE ORAL at 07:08

## 2017-08-05 RX ADMIN — HYDROMORPHONE HYDROCHLORIDE 0.5 MG: 1 INJECTION, SOLUTION INTRAMUSCULAR; INTRAVENOUS; SUBCUTANEOUS at 09:08

## 2017-08-05 RX ADMIN — OXYCODONE HYDROCHLORIDE 10 MG: 5 TABLET ORAL at 12:08

## 2017-08-05 RX ADMIN — HYDROMORPHONE HYDROCHLORIDE 0.5 MG: 1 INJECTION, SOLUTION INTRAMUSCULAR; INTRAVENOUS; SUBCUTANEOUS at 04:08

## 2017-08-05 RX ADMIN — GABAPENTIN 600 MG: 300 CAPSULE ORAL at 02:08

## 2017-08-05 RX ADMIN — GUAIFENESIN 1200 MG: 600 TABLET, EXTENDED RELEASE ORAL at 09:08

## 2017-08-05 RX ADMIN — HYDROMORPHONE HYDROCHLORIDE 0.5 MG: 1 INJECTION, SOLUTION INTRAMUSCULAR; INTRAVENOUS; SUBCUTANEOUS at 02:08

## 2017-08-05 RX ADMIN — INSULIN DETEMIR 15 UNITS: 100 INJECTION, SOLUTION SUBCUTANEOUS at 09:08

## 2017-08-05 RX ADMIN — DIGOXIN 0.12 MG: 125 TABLET ORAL at 09:08

## 2017-08-05 RX ADMIN — WARFARIN SODIUM 7.5 MG: 2.5 TABLET ORAL at 04:08

## 2017-08-05 RX ADMIN — FLUTICASONE PROPIONATE 2 SPRAY: 50 SPRAY, METERED NASAL at 09:08

## 2017-08-05 RX ADMIN — FUROSEMIDE 20 MG/HR: 10 INJECTION, SOLUTION INTRAMUSCULAR; INTRAVENOUS at 03:08

## 2017-08-05 RX ADMIN — PANTOPRAZOLE SODIUM 40 MG: 40 TABLET, DELAYED RELEASE ORAL at 09:08

## 2017-08-05 RX ADMIN — ATORVASTATIN CALCIUM 80 MG: 20 TABLET, FILM COATED ORAL at 09:08

## 2017-08-05 RX ADMIN — GABAPENTIN 600 MG: 300 CAPSULE ORAL at 06:08

## 2017-08-05 RX ADMIN — GABAPENTIN 600 MG: 300 CAPSULE ORAL at 09:08

## 2017-08-05 RX ADMIN — OXYCODONE HYDROCHLORIDE 10 MG: 5 TABLET ORAL at 08:08

## 2017-08-05 RX ADMIN — HYDROMORPHONE HYDROCHLORIDE 0.5 MG: 1 INJECTION, SOLUTION INTRAMUSCULAR; INTRAVENOUS; SUBCUTANEOUS at 06:08

## 2017-08-05 RX ADMIN — ALPRAZOLAM 1 MG: 1 TABLET ORAL at 09:08

## 2017-08-05 RX ADMIN — POTASSIUM CHLORIDE 30 MEQ: 750 CAPSULE, EXTENDED RELEASE ORAL at 09:08

## 2017-08-05 RX ADMIN — ASPIRIN 81 MG: 81 TABLET, COATED ORAL at 09:08

## 2017-08-05 RX ADMIN — FLUTICASONE FUROATE AND VILANTEROL TRIFENATATE 1 PUFF: 100; 25 POWDER RESPIRATORY (INHALATION) at 09:08

## 2017-08-05 RX ADMIN — TAMSULOSIN HYDROCHLORIDE 0.4 MG: 0.4 CAPSULE ORAL at 09:08

## 2017-08-05 NOTE — ASSESSMENT & PLAN NOTE
- CHADS VASC 3  - Continue home  digoxin   - not currently receiving amiodarone - currently in sinus rhythm, will reassess amiodarone necessity   - restarted warfarin following RHC on 8/4  - will consider starting patient on a NOAC/ DOAC instead of warfarin

## 2017-08-05 NOTE — PLAN OF CARE
Problem: Patient Care Overview  Goal: Plan of Care Review  POC reviewed w pt,ongoing,requiring IV pain med q4hrs,wears own bipap at hs,Lasix gtt infusing,on strict I/o,no falls.

## 2017-08-05 NOTE — ASSESSMENT & PLAN NOTE
Patient seen by hepatology at [facility] and in discussion regarding treatment.  Appears that his lung disease is the ultimately limiting factor for treatment (his concerns regarding current treatments and effects on pulmonary status, as well as high resistance to of hepC strain to currently available regimens).  Patient scheduled to f/u with Hepatology August 2017 with Dr. Lopez.

## 2017-08-05 NOTE — PLAN OF CARE
Problem: Patient Care Overview  Goal: Plan of Care Review  Outcome: Ongoing (interventions implemented as appropriate)  POC reviewed with the patient and voiced understanding. No acute distress noted, no SOB reported. Informed of changes to his prn pain meds and demonstrates understanding. Bed in low position, wheels locked and call light within reached.

## 2017-08-05 NOTE — ASSESSMENT & PLAN NOTE
- Spiriva  - breo inhaler  - Duo-nebs prn  - may likely need PFTs to assess for severity of COPD - possibly as outpatient

## 2017-08-05 NOTE — SUBJECTIVE & OBJECTIVE
Interval History: NAEON. RHC yesterday showed severe PH. Pt tolerated procedure well. No complaints this AM. UOP over last 24 hrs 1950cc (-17.6L since admission).    Review of Systems  Objective:     Vital Signs (Most Recent):  Temp: 98.6 °F (37 °C) (08/05/17 1200)  Pulse: 88 (08/05/17 1200)  Resp: 19 (08/05/17 1200)  BP: (!) 99/56 (08/05/17 1200)  SpO2: (!) 88 % (08/05/17 1200) Vital Signs (24h Range):  Temp:  [96.6 °F (35.9 °C)-98.6 °F (37 °C)] 98.6 °F (37 °C)  Pulse:  [72-88] 88  Resp:  [16-21] 19  SpO2:  [77 %-90 %] 88 %  BP: ()/(50-74) 99/56     Weight: 120 kg (264 lb 9.6 oz)  Body mass index is 36.9 kg/m².    Intake/Output Summary (Last 24 hours) at 08/05/17 1455  Last data filed at 08/05/17 1319   Gross per 24 hour   Intake             1307 ml   Output             3010 ml   Net            -1703 ml      Physical Exam   Constitutional: He is oriented to person, place, and time. He appears well-developed and well-nourished. No distress.   Eyes: EOM are normal.   Neck: Neck supple.   Cardiovascular: Normal rate, regular rhythm, normal heart sounds and intact distal pulses.    Pulmonary/Chest: Effort normal. No respiratory distress. He has no wheezes.   Decreased BS bilateral bases   Abdominal: Soft. Bowel sounds are normal. He exhibits distension. There is no tenderness.   Musculoskeletal: He exhibits edema (2+ LE edema bilaterally).   Edema improving   Neurological: He is alert and oriented to person, place, and time. No cranial nerve deficit.   Skin: Skin is warm and dry. Capillary refill takes less than 2 seconds.       Significant Labs:   CMP:   Recent Labs  Lab 08/04/17  0426 08/05/17  0418    140   K 3.3* 3.2*    101   CO2 28 29   GLU 97 106   BUN 17 18   CREATININE 1.1 0.9   CALCIUM 8.6* 8.5*   PROT 6.2 6.2   ALBUMIN 3.0* 3.0*   BILITOT 3.1* 3.1*   ALKPHOS 85 87   AST 41* 34   ALT 55* 51*   ANIONGAP 12 10   EGFRNONAA >60.0 >60.0     Coagulation:   Recent Labs  Lab 08/05/17  0418   INR 1.1      POCT Glucose:   Recent Labs  Lab 08/04/17 2053 08/05/17  0733 08/05/17  1202   POCTGLUCOSE 119* 104 108     All pertinent labs within the past 24 hours have been reviewed.    Significant Imaging: I have reviewed all pertinent imaging results/findings within the past 24 hours.

## 2017-08-05 NOTE — ASSESSMENT & PLAN NOTE
- pt with multiple L rib fractures which can be seen on CTA 7/24; pt reports falling on O2 tank  - stopped dilaudid, now has oxycodone PRN

## 2017-08-05 NOTE — ASSESSMENT & PLAN NOTE
- multifactorial with pulm HTN, RH failure, COPD, HATTIE, OHS  - Hypoxic at baseline w/ chronic supplemental O2 requirement (4-5 liters) and Trilogy at night, transferred for pulm-HTN eval.   - Continued guaifenesin, DuoNeb q6 and Budesonide q12.  - repeat ECHO showed normal EF and elevated estimated PASP, but was unable to accurately determine PA pressure due to inadequate TR jet.  - pulmonology and HTS consulted, appreciate recs  - increased lasix gtt from 10--> 20 mg/hr, continuing to diurese   - RHC 8/4 showed moderately reduced CO/CI on 5L NC O2 with no inotrope, mild to moderate elevated in right and mild elevation in left sided filling pressure, and severe pulmonary hypertension

## 2017-08-05 NOTE — PROGRESS NOTES
[facility] Hospital Medicine  Progress Note    Patient Name: Shaan Brown  MRN: [id number]  Patient Class: IP- Inpatient   Admission Date: 7/27/2017  Length of Stay: 9 days  Attending Physician: Donnie Rueda MD  Primary Care Provider: Mendel Ferguson Iii, MD    [facility] Medicine Team: [facility] MED 1 Riley Castellano MD    Subjective:     Principal Problem:Acute on chronic respiratory failure with hypoxia and hypercapnia    HPI:  Shaan Brown is a 62-year-old male w/ chronic mixed hypercapnia and hypoxemia 2/2 COPD, HFpEF, HATTIE and obesity hypoventilation who's on 4-5 liters of continuous supplemental O2 (SpO2 typically in the low 90s-80s)  w/ Trilogy at night. He presented to [facility] on 7/13 via EMS for worsening hypoxia and altered mental status. Initially admitted to the ICU. Worsening hypoxia was thought to be 2/2 to pulmonary HTN w/ PA pressures of 71. Bubble study was negative for shunt. Pt was also started on therapeutic Lovenox and was being bridged to warfarin for suspected PE, despite negative CTA. Finished abx treatment for RLL PNA. Diuretics were held 2/2 to hypotension. Pt has what looks to be a NSTEMI 2/2 to demand, however patient was not tolerate of beta-blocker therapy 2/2 to hypoxia. No improvement in hypoxia despite steroids. Transferred to [facility] for evaluation by pulmonology for pulmonary HTN.   On arrive, pt was hypoxic on 5 liters of supplemental O2. He was complaining of lower back pain.     Hospital Course:  Transferred from [facility] to [facility]'s IM-1 service for evaluation of worsening pulmonary HTN. He was continued on scheduled duonebs, budesonide full dose lovenox. Diuresis with lasix 40 IV BID was started. Pulmonology was consulted and recommended continued diuresis and aerosol treatments, heart transplant consult for PHtn, and stopping full dose AC. Heart transplant was consulted and recommended repeating the ECHO following  aggressive diuresis. Pt c/o L chest wall pain that he reports began after he fell onto an O2 container. On review of CTA from 7/24, pt has multiple fractured ribs on L side; PRN pain medication ordered. He also reported episodes of chest pain during this admission which were consistent with previous episodes of angina; workup not concerning for infarction. Lasix IVP switched to lasix gtt to improve diuresis. ECHO 7/31 showed normal EF and elevated estimated PASP, but was unable to accurately determine PA pressure due to inadequate TR jet. Continued on lasix gtt; rate increased from 10 cc/hr to 20cc/hr for more adequate diuresis. RHC done on 8/4 showed moderately reduced CO/CI, mild to moderate elevated in right and mild elevation in left sided filling pressure and severe pulmonary hypertension.    Interval History: NAEON. RHC yesterday showed severe PH. Pt tolerated procedure well. No complaints this AM. UOP over last 24 hrs 1950cc (-17.6L since admission).    Review of Systems  Objective:     Vital Signs (Most Recent):  Temp: 98.6 °F (37 °C) (08/05/17 1200)  Pulse: 88 (08/05/17 1200)  Resp: 19 (08/05/17 1200)  BP: (!) 99/56 (08/05/17 1200)  SpO2: (!) 88 % (08/05/17 1200) Vital Signs (24h Range):  Temp:  [96.6 °F (35.9 °C)-98.6 °F (37 °C)] 98.6 °F (37 °C)  Pulse:  [72-88] 88  Resp:  [16-21] 19  SpO2:  [77 %-90 %] 88 %  BP: ()/(50-74) 99/56     Weight: 120 kg (264 lb 9.6 oz)  Body mass index is 36.9 kg/m².    Intake/Output Summary (Last 24 hours) at 08/05/17 1455  Last data filed at 08/05/17 1319   Gross per 24 hour   Intake             1307 ml   Output             3010 ml   Net            -1703 ml      Physical Exam   Constitutional: He is oriented to person, place, and time. He appears well-developed and well-nourished. No distress.   Eyes: EOM are normal.   Neck: Neck supple.   Cardiovascular: Normal rate, regular rhythm, normal heart sounds and intact distal pulses.    Pulmonary/Chest: Effort normal. No  respiratory distress. He has no wheezes.   Decreased BS bilateral bases   Abdominal: Soft. Bowel sounds are normal. He exhibits distension. There is no tenderness.   Musculoskeletal: He exhibits edema (2+ LE edema bilaterally).   Edema improving   Neurological: He is alert and oriented to person, place, and time. No cranial nerve deficit.   Skin: Skin is warm and dry. Capillary refill takes less than 2 seconds.       Significant Labs:   CMP:   Recent Labs  Lab 08/04/17  0426 08/05/17  0418    140   K 3.3* 3.2*    101   CO2 28 29   GLU 97 106   BUN 17 18   CREATININE 1.1 0.9   CALCIUM 8.6* 8.5*   PROT 6.2 6.2   ALBUMIN 3.0* 3.0*   BILITOT 3.1* 3.1*   ALKPHOS 85 87   AST 41* 34   ALT 55* 51*   ANIONGAP 12 10   EGFRNONAA >60.0 >60.0     Coagulation:   Recent Labs  Lab 08/05/17  0418   INR 1.1     POCT Glucose:   Recent Labs  Lab 08/04/17  2053 08/05/17  0733 08/05/17  1202   POCTGLUCOSE 119* 104 108     All pertinent labs within the past 24 hours have been reviewed.    Significant Imaging: I have reviewed all pertinent imaging results/findings within the past 24 hours.    Assessment/Plan:      * Acute on chronic respiratory failure with hypoxia and hypercapnia    - multifactorial with pulm HTN, RH failure, COPD, HATTIE, OHS  - Hypoxic at baseline w/ chronic supplemental O2 requirement (4-5 liters) and Trilogy at night, transferred for pulm-HTN eval.   - Continued guaifenesin, DuoNeb q6 and Budesonide q12.  - repeat ECHO showed normal EF and elevated estimated PASP, but was unable to accurately determine PA pressure due to inadequate TR jet.  - pulmonology and HTS consulted, appreciate recs  - increased lasix gtt from 10--> 20 mg/hr, continuing to diurese   - RHC 8/4 showed moderately reduced CO/CI on 5L NC O2 with no inotrope, mild to moderate elevated in right and mild elevation in left sided filling pressure, and severe pulmonary hypertension        Coronary artery disease involving native coronary artery of  native heart without angina pectoris    - Troponemia prior to transfer thought to be 2/2 to demand ischemia  - cont isosorbide mononitrate, ranolazine, ASA and atorvastatin.   - pt reports stent placement in 5/2016. Will obtain outside records to determine the need for continuing plavix  - has had chest pain during this admission c/w previous angina but may be 2/2 rib fractures; SL nitroglycerin PRN        Chronic obstructive pulmonary disease with acute exacerbation    - Spiriva  - breo inhaler  - Duo-nebs prn  - may likely need PFTs to assess for severity of COPD - possibly as outpatient        Paroxysmal atrial fibrillation    - CHADS VASC 3  - Continue home  digoxin   - not currently receiving amiodarone - currently in sinus rhythm, will reassess amiodarone necessity   - restarted warfarin following RHC on 8/4  - will consider starting patient on a NOAC/ DOAC instead of warfarin         Type 2 diabetes mellitus with hyperglycemia    - Detemir to 15 units BID w/ SSI - adequate control         Peripheral vascular disease    - continuing ASA and atorvastatin         HATTIE on CPAP    BiPAP QHS.        Morbid obesity with alveolar hypoventilation    BiPAP QHS.         Chronic hepatitis C    Patient seen by hepatology at [facility] and in discussion regarding treatment.  Appears that his lung disease is the ultimately limiting factor for treatment (his concerns regarding current treatments and effects on pulmonary status, as well as high resistance to of hepC strain to currently available regimens).  Patient scheduled to f/u with Hepatology August 2017 with Dr. Lopez.         Rib fractures    - pt with multiple L rib fractures which can be seen on CTA 7/24; pt reports falling on O2 tank  - stopped dilaudid, now has oxycodone PRN        Pulmonary HTN    - as above  - RHC on 8/4 showed severe PH        Obesity due to excess calories    - 1200 demetria diabetic diet          VTE Risk Mitigation         Ordered     warfarin tablet 7.5  mg  Daily     Route:  Oral        08/05/17 1442              Riley Castellano MD  Department of [facility] Medicine   [facility]

## 2017-08-06 LAB
ALBUMIN SERPL BCP-MCNC: 3 G/DL
ALP SERPL-CCNC: 84 U/L
ALT SERPL W/O P-5'-P-CCNC: 44 U/L
ANION GAP SERPL CALC-SCNC: 9 MMOL/L
AST SERPL-CCNC: 32 U/L
BILIRUB SERPL-MCNC: 2.6 MG/DL
BUN SERPL-MCNC: 19 MG/DL
CALCIUM SERPL-MCNC: 8.4 MG/DL
CHLORIDE SERPL-SCNC: 101 MMOL/L
CO2 SERPL-SCNC: 30 MMOL/L
CREAT SERPL-MCNC: 1 MG/DL
EST. GFR  (AFRICAN AMERICAN): >60 ML/MIN/1.73 M^2
EST. GFR  (NON AFRICAN AMERICAN): >60 ML/MIN/1.73 M^2
GLUCOSE SERPL-MCNC: 93 MG/DL
INR PPP: 1.2
MAGNESIUM SERPL-MCNC: 1.8 MG/DL
POCT GLUCOSE: 102 MG/DL (ref 70–110)
POCT GLUCOSE: 109 MG/DL (ref 70–110)
POCT GLUCOSE: 111 MG/DL (ref 70–110)
POCT GLUCOSE: 119 MG/DL (ref 70–110)
POTASSIUM SERPL-SCNC: 3.1 MMOL/L
PROT SERPL-MCNC: 6.2 G/DL
PROTHROMBIN TIME: 12.8 SEC
SODIUM SERPL-SCNC: 140 MMOL/L

## 2017-08-06 PROCEDURE — 83735 ASSAY OF MAGNESIUM: CPT

## 2017-08-06 PROCEDURE — 63600175 PHARM REV CODE 636 W HCPCS: Performed by: HOSPITALIST

## 2017-08-06 PROCEDURE — 85610 PROTHROMBIN TIME: CPT

## 2017-08-06 PROCEDURE — 11000001 HC ACUTE MED/SURG PRIVATE ROOM

## 2017-08-06 PROCEDURE — 36415 COLL VENOUS BLD VENIPUNCTURE: CPT

## 2017-08-06 PROCEDURE — 99232 SBSQ HOSP IP/OBS MODERATE 35: CPT | Mod: GC,,, | Performed by: HOSPITALIST

## 2017-08-06 PROCEDURE — 27000173 HC ACAPELLA DEVICE DH OR DM

## 2017-08-06 PROCEDURE — 25000242 PHARM REV CODE 250 ALT 637 W/ HCPCS: Performed by: HOSPITALIST

## 2017-08-06 PROCEDURE — 25000003 PHARM REV CODE 250: Performed by: INTERNAL MEDICINE

## 2017-08-06 PROCEDURE — 25000003 PHARM REV CODE 250: Performed by: HOSPITALIST

## 2017-08-06 PROCEDURE — 94664 DEMO&/EVAL PT USE INHALER: CPT

## 2017-08-06 PROCEDURE — 80053 COMPREHEN METABOLIC PANEL: CPT

## 2017-08-06 PROCEDURE — 27000221 HC OXYGEN, UP TO 24 HOURS

## 2017-08-06 PROCEDURE — 25000003 PHARM REV CODE 250: Performed by: STUDENT IN AN ORGANIZED HEALTH CARE EDUCATION/TRAINING PROGRAM

## 2017-08-06 RX ORDER — MAGNESIUM SULFATE HEPTAHYDRATE 40 MG/ML
2 INJECTION, SOLUTION INTRAVENOUS ONCE
Status: COMPLETED | OUTPATIENT
Start: 2017-08-06 | End: 2017-08-06

## 2017-08-06 RX ORDER — POTASSIUM CHLORIDE 20 MEQ/1
40 TABLET, EXTENDED RELEASE ORAL DAILY
Status: DISCONTINUED | OUTPATIENT
Start: 2017-08-06 | End: 2017-08-07

## 2017-08-06 RX ORDER — POTASSIUM CHLORIDE 20 MEQ/1
40 TABLET, EXTENDED RELEASE ORAL ONCE
Status: COMPLETED | OUTPATIENT
Start: 2017-08-06 | End: 2017-08-06

## 2017-08-06 RX ORDER — WARFARIN SODIUM 5 MG/1
10 TABLET ORAL DAILY
Status: DISCONTINUED | OUTPATIENT
Start: 2017-08-06 | End: 2017-08-08

## 2017-08-06 RX ORDER — IBUPROFEN 400 MG/1
400 TABLET ORAL 3 TIMES DAILY
Status: DISCONTINUED | OUTPATIENT
Start: 2017-08-06 | End: 2017-08-07

## 2017-08-06 RX ADMIN — POTASSIUM CHLORIDE 40 MEQ: 1500 TABLET, EXTENDED RELEASE ORAL at 02:08

## 2017-08-06 RX ADMIN — FUROSEMIDE 20 MG/HR: 10 INJECTION, SOLUTION INTRAMUSCULAR; INTRAVENOUS at 01:08

## 2017-08-06 RX ADMIN — POTASSIUM CHLORIDE 40 MEQ: 1500 TABLET, EXTENDED RELEASE ORAL at 08:08

## 2017-08-06 RX ADMIN — ALPRAZOLAM 1 MG: 1 TABLET ORAL at 09:08

## 2017-08-06 RX ADMIN — GABAPENTIN 600 MG: 300 CAPSULE ORAL at 06:08

## 2017-08-06 RX ADMIN — FLUTICASONE PROPIONATE 2 SPRAY: 50 SPRAY, METERED NASAL at 08:08

## 2017-08-06 RX ADMIN — MAGNESIUM OXIDE TAB 400 MG (241.3 MG ELEMENTAL MG) 400 MG: 400 (241.3 MG) TAB at 08:08

## 2017-08-06 RX ADMIN — TAMSULOSIN HYDROCHLORIDE 0.4 MG: 0.4 CAPSULE ORAL at 08:08

## 2017-08-06 RX ADMIN — WARFARIN SODIUM 10 MG: 5 TABLET ORAL at 05:08

## 2017-08-06 RX ADMIN — MAGNESIUM OXIDE TAB 400 MG (241.3 MG ELEMENTAL MG) 400 MG: 400 (241.3 MG) TAB at 09:08

## 2017-08-06 RX ADMIN — MAGNESIUM SULFATE IN WATER 2 G: 40 INJECTION, SOLUTION INTRAVENOUS at 09:08

## 2017-08-06 RX ADMIN — PANTOPRAZOLE SODIUM 40 MG: 40 TABLET, DELAYED RELEASE ORAL at 08:08

## 2017-08-06 RX ADMIN — ASPIRIN 81 MG: 81 TABLET, COATED ORAL at 08:08

## 2017-08-06 RX ADMIN — TIOTROPIUM BROMIDE 18 MCG: 18 CAPSULE ORAL; RESPIRATORY (INHALATION) at 08:08

## 2017-08-06 RX ADMIN — GUAIFENESIN 1200 MG: 600 TABLET, EXTENDED RELEASE ORAL at 08:08

## 2017-08-06 RX ADMIN — ISOSORBIDE MONONITRATE 30 MG: 30 TABLET ORAL at 08:08

## 2017-08-06 RX ADMIN — FLUTICASONE FUROATE AND VILANTEROL TRIFENATATE 1 PUFF: 100; 25 POWDER RESPIRATORY (INHALATION) at 08:08

## 2017-08-06 RX ADMIN — POLYETHYLENE GLYCOL 3350 17 G: 17 POWDER, FOR SOLUTION ORAL at 08:08

## 2017-08-06 RX ADMIN — GABAPENTIN 600 MG: 300 CAPSULE ORAL at 09:08

## 2017-08-06 RX ADMIN — RANOLAZINE 500 MG: 500 TABLET, FILM COATED, EXTENDED RELEASE ORAL at 09:08

## 2017-08-06 RX ADMIN — FUROSEMIDE 20 MG/HR: 10 INJECTION, SOLUTION INTRAMUSCULAR; INTRAVENOUS at 02:08

## 2017-08-06 RX ADMIN — OXYCODONE HYDROCHLORIDE 10 MG: 5 TABLET ORAL at 06:08

## 2017-08-06 RX ADMIN — IBUPROFEN 400 MG: 400 TABLET, FILM COATED ORAL at 09:08

## 2017-08-06 RX ADMIN — GABAPENTIN 600 MG: 300 CAPSULE ORAL at 02:08

## 2017-08-06 RX ADMIN — RANOLAZINE 500 MG: 500 TABLET, FILM COATED, EXTENDED RELEASE ORAL at 08:08

## 2017-08-06 RX ADMIN — ATORVASTATIN CALCIUM 80 MG: 20 TABLET, FILM COATED ORAL at 08:08

## 2017-08-06 RX ADMIN — IBUPROFEN 400 MG: 400 TABLET, FILM COATED ORAL at 02:08

## 2017-08-06 RX ADMIN — INSULIN DETEMIR 15 UNITS: 100 INJECTION, SOLUTION SUBCUTANEOUS at 08:08

## 2017-08-06 RX ADMIN — GUAIFENESIN 1200 MG: 600 TABLET, EXTENDED RELEASE ORAL at 09:08

## 2017-08-06 RX ADMIN — DIGOXIN 0.12 MG: 125 TABLET ORAL at 08:08

## 2017-08-06 RX ADMIN — INSULIN DETEMIR 15 UNITS: 100 INJECTION, SOLUTION SUBCUTANEOUS at 09:08

## 2017-08-06 NOTE — ASSESSMENT & PLAN NOTE
- replace with PO K+ supplements given lasix gtt diuresis, goal K+ of 4  - placed on daily K+ of 40 mEq, please stop when off the lasix gtt

## 2017-08-06 NOTE — ASSESSMENT & PLAN NOTE
- Troponemia prior to transfer thought to be 2/2 to demand ischemia  - cont isosorbide mononitrate, ranolazine, ASA and atorvastatin.   - pt reports stent placement in 5/2016. Will obtain outside records to determine the need for continuing plavix  - has had chest pain during this admission c/w previous angina but may be 2/2 rib fractures; SL nitroglycerin PRN    - need to further investigate necessity for isosorbide and ranolazine - asked patient for wife to bring med bottles from home

## 2017-08-06 NOTE — SUBJECTIVE & OBJECTIVE
Interval History:   No acute events over night. Diuresing well. 24 hr I/O of -1L and -18 L net since admit. On lasix gtt at 20/hr.  SBPs are in 100-110, but overall tolerated well. Continues to note lateral chest wall pain from fractured rib(s), requesting IV dilaudid.     Attempted to inquired regarding patient's home meds, however, patient deferred to his wife who manages his meds. Recommended that pt's wife brings pill bottles. Patient is still unsure of his warfarin vs DOAC and isosorbide.      aspirin  81 mg Oral Daily    atorvastatin  80 mg Oral Daily    digoxin  0.125 mg Oral Daily    fluticasone  2 spray Each Nare Daily    fluticasone-vilanterol  1 puff Inhalation Daily    gabapentin  600 mg Oral TID    ibuprofen  400 mg Oral TID    insulin detemir  15 Units Subcutaneous BID    isosorbide mononitrate  30 mg Oral Daily    lidocaine  1 patch Transdermal Q24H    magnesium oxide  400 mg Oral BID    pantoprazole  40 mg Oral Daily    polyethylene glycol  17 g Oral Daily    potassium chloride SA  40 mEq Oral Daily    potassium chloride SA  40 mEq Oral Once    ranolazine  500 mg Oral BID    tamsulosin  0.4 mg Oral Daily    tiotropium  1 capsule Inhalation Daily    warfarin  10 mg Oral Daily      furosemide (LASIX) 1 mg/mL infusion (non-titrating) 20 mg/hr (08/06/17 0156)     acetaminophen, albuterol-ipratropium 2.5mg-0.5mg/3mL, alprazolam, dextrose 50%, dextrose 50%, glucagon (human recombinant), glucose, glucose, guaifenesin, hydrOXYzine HCl, insulin aspart, nitroGLYCERIN, oxycodone, oxycodone, ramelteon, sodium chloride    Review of Systems   Constitutional: Negative for activity change, appetite change, chills, fatigue and fever.   Respiratory: Negative for cough, shortness of breath and wheezing.    Cardiovascular: Negative for chest pain, palpitations and leg swelling.   Gastrointestinal: Negative for abdominal distention and abdominal pain.   Genitourinary: Negative for decreased urine  volume and hematuria.   Musculoskeletal: Positive for arthralgias.        Lateral chest call pain from fractured ribs   Neurological: Negative for dizziness, weakness and light-headedness.   Psychiatric/Behavioral: Negative for confusion.     Objective:     Vital Signs (Most Recent):  Temp: 97.5 °F (36.4 °C) (08/06/17 1156)  Pulse: 86 (08/06/17 1156)  Resp: 20 (08/06/17 1156)  BP: 104/62 (08/06/17 1156)  SpO2: (!) 83 % (08/06/17 1156) Vital Signs (24h Range):  Temp:  [97.4 °F (36.3 °C)-99 °F (37.2 °C)] 97.5 °F (36.4 °C)  Pulse:  [67-86] 86  Resp:  [18-20] 20  SpO2:  [82 %-98 %] 83 %  BP: ()/(52-66) 104/62     Weight: 119.7 kg (264 lb)  Body mass index is 36.82 kg/m².    Intake/Output Summary (Last 24 hours) at 08/06/17 1420  Last data filed at 08/06/17 1200   Gross per 24 hour   Intake          1547.33 ml   Output             2140 ml   Net          -592.67 ml      Physical Exam   Constitutional: He is oriented to person, place, and time. He appears well-developed and well-nourished. No distress.   Eyes: EOM are normal.   Neck: Neck supple.   Cardiovascular: Normal rate, regular rhythm, normal heart sounds and intact distal pulses.    Pulmonary/Chest: Effort normal. No respiratory distress. He has no wheezes.   Decreased BS bilateral bases; no significant pain to palpation of fractured ribs from when patient fell   Abdominal: Soft. Bowel sounds are normal. He exhibits distension. There is no tenderness.   Musculoskeletal: He exhibits edema (2+ LE edema bilaterally).   Edema improving from admit   Neurological: He is alert and oriented to person, place, and time. No cranial nerve deficit.   Skin: Skin is warm and dry. Capillary refill takes less than 2 seconds.       Significant Labs:   A1C:   Recent Labs  Lab 07/14/17  0353 08/03/17  0436   HGBA1C 13.1* 8.8*     CBC: No results for input(s): WBC, HGB, HCT, PLT in the last 48 hours.  CMP:   Recent Labs  Lab 08/05/17  0418 08/06/17  0341    140   K 3.2*  3.1*    101   CO2 29 30*    93   BUN 18 19   CREATININE 0.9 1.0   CALCIUM 8.5* 8.4*   PROT 6.2 6.2   ALBUMIN 3.0* 3.0*   BILITOT 3.1* 2.6*   ALKPHOS 87 84   AST 34 32   ALT 51* 44   ANIONGAP 10 9   EGFRNONAA >60.0 >60.0     POCT Glucose:   Recent Labs  Lab 08/05/17 2001 08/06/17  0732 08/06/17  1159   POCTGLUCOSE 158* 102 119*

## 2017-08-06 NOTE — ASSESSMENT & PLAN NOTE
- CHADS VASC 3  - Continue home  digoxin   - not currently receiving amiodarone - currently in sinus rhythm, will reassess amiodarone necessity   - restarted warfarin following RHC on 8/4  - increase warfarin to 10 mg today given subtherapeutic inr  - will consider starting patient on a NOAC/ DOAC instead of warfarin

## 2017-08-06 NOTE — PLAN OF CARE
POC reviewed w pt,uses own bipap at hs,Lasix drip maintained,requested RN  call MD for x1 dose IV pain med at HS.

## 2017-08-06 NOTE — PLAN OF CARE
Problem: Patient Care Overview  Goal: Plan of Care Review  Outcome: Ongoing (interventions implemented as appropriate)  Pt was resting most of the day, POC discussed and voiced understanding. Informed of pain med regimen. Acucheck results within normal range, no acute distress noted. Safety precautions maintained. Wife at bedside.

## 2017-08-06 NOTE — ASSESSMENT & PLAN NOTE
- Detemir to 15 units BID w/ SSI - adequate control  - A1C has overall decreased from 13 -> to 8 over 1 mo time span

## 2017-08-06 NOTE — ASSESSMENT & PLAN NOTE
- multifactorial with pulm HTN, RH failure, COPD, HATTIE, OHS  - Hypoxic at baseline w/ chronic supplemental O2 requirement (4-5 liters) and Trilogy at night, transferred for pulm-HTN eval.   - Continued guaifenesin, DuoNeb q6 and Budesonide q12.  - repeat ECHO showed normal EF and elevated estimated PASP, but was unable to accurately determine PA pressure due to inadequate TR jet.  - pulmonology and HTS consulted, appreciate recs  - increased lasix gtt from 10--> 20 mg/hr, continuing to diurese   - RHC 8/4 showed moderately reduced CO/CI on 5L NC O2 with no inotrope, mild to moderate elevated in right and mild elevation in left sided filling pressure, and severe pulmonary hypertension  - cont lasix gtt and wean O2 as tolerated

## 2017-08-06 NOTE — ASSESSMENT & PLAN NOTE
- pt with multiple L rib fractures which can be seen on CTA 7/24; pt reports falling on O2 tank  - stopped IV dilaudid (please avoid)  - scheduled iboprofen  - oxycodone PRN

## 2017-08-06 NOTE — PROGRESS NOTES
[facility] Hospital Medicine  Progress Note    Patient Name: Shaan Brown  MRN: [id number]  Patient Class: IP- Inpatient   Admission Date: 7/27/2017  Length of Stay: 10 days  Attending Physician: Donnie Rueda MD  Primary Care Provider: Mendel Ferguson Iii, MD    Hospital Medicine Team: [facility] 1 Mary Mccallum MD    Subjective:     Principal Problem:Acute on chronic respiratory failure with hypoxia and hypercapnia    HPI:  Shaan Brown is a 62-year-old male w/ chronic mixed hypercapnia and hypoxemia 2/2 COPD, HFpEF, HATTIE and obesity hypoventilation who's on 4-5 liters of continuous supplemental O2 (SpO2 typically in the low 90s-80s)  w/ Trilogy at night. He presented to [facility] on 7/13 via EMS for worsening hypoxia and altered mental status. Initially admitted to the ICU. Worsening hypoxia was thought to be 2/2 to pulmonary HTN w/ PA pressures of 71. Bubble study was negative for shunt. Pt was also started on therapeutic Lovenox and was being bridged to warfarin for suspected PE, despite negative CTA. Finished abx treatment for RLL PNA. Diuretics were held 2/2 to hypotension. Pt has what looks to be a NSTEMI 2/2 to demand, however patient was not tolerate of beta-blocker therapy 2/2 to hypoxia. No improvement in hypoxia despite steroids. Transferred to [facility] for evaluation by pulmonology for pulmonary HTN.   On arrive, pt was hypoxic on 5 liters of supplemental O2. He was complaining of lower back pain.     Hospital Course:  Transferred from [facility] to [facility] service for evaluation of worsening pulmonary HTN. He was continued on scheduled duonebs, budesonide full dose lovenox. Diuresis with lasix 40 IV BID was started. Pulmonology was consulted and recommended continued diuresis and aerosol treatments, heart transplant consult for PHtn, and stopping full dose AC. Heart transplant was consulted and recommended repeating the ECHO  following aggressive diuresis. Pt c/o L chest wall pain that he reports began after he fell onto an O2 container. On review of CTA from 7/24, pt has multiple fractured ribs on L side; PRN pain medication ordered. He also reported episodes of chest pain during this admission which were consistent with previous episodes of angina; workup not concerning for infarction. Lasix IVP switched to lasix gtt to improve diuresis. ECHO 7/31 showed normal EF and elevated estimated PASP, but was unable to accurately determine PA pressure due to inadequate TR jet. Continued on lasix gtt; rate increased from 10 cc/hr to 20cc/hr for more adequate diuresis. RHC done on 8/4 showed moderately reduced CO/CI, mild to moderate elevated in right and mild elevation in left sided filling pressure and severe pulmonary hypertension.    Interval History:   No acute events over night. Diuresing well. 24 hr I/O of -1L and -18 L net since admit. On lasix gtt at 20/hr.  SBPs are in 100-110, but overall tolerated well. Continues to note lateral chest wall pain from fractured rib(s), requesting IV dilaudid.     Attempted to inquired regarding patient's home meds, however, patient deferred to his wife who manages his meds. Recommended that pt's wife brings pill bottles. Patient is still unsure of his warfarin vs DOAC and isosorbide.      aspirin  81 mg Oral Daily    atorvastatin  80 mg Oral Daily    digoxin  0.125 mg Oral Daily    fluticasone  2 spray Each Nare Daily    fluticasone-vilanterol  1 puff Inhalation Daily    gabapentin  600 mg Oral TID    ibuprofen  400 mg Oral TID    insulin detemir  15 Units Subcutaneous BID    isosorbide mononitrate  30 mg Oral Daily    lidocaine  1 patch Transdermal Q24H    magnesium oxide  400 mg Oral BID    pantoprazole  40 mg Oral Daily    polyethylene glycol  17 g Oral Daily    potassium chloride SA  40 mEq Oral Daily    potassium chloride SA  40 mEq Oral Once    ranolazine  500 mg Oral BID     tamsulosin  0.4 mg Oral Daily    tiotropium  1 capsule Inhalation Daily    warfarin  10 mg Oral Daily      furosemide (LASIX) 1 mg/mL infusion (non-titrating) 20 mg/hr (08/06/17 0156)     acetaminophen, albuterol-ipratropium 2.5mg-0.5mg/3mL, alprazolam, dextrose 50%, dextrose 50%, glucagon (human recombinant), glucose, glucose, guaifenesin, hydrOXYzine HCl, insulin aspart, nitroGLYCERIN, oxycodone, oxycodone, ramelteon, sodium chloride    Review of Systems   Constitutional: Negative for activity change, appetite change, chills, fatigue and fever.   Respiratory: Negative for cough, shortness of breath and wheezing.    Cardiovascular: Negative for chest pain, palpitations and leg swelling.   Gastrointestinal: Negative for abdominal distention and abdominal pain.   Genitourinary: Negative for decreased urine volume and hematuria.   Musculoskeletal: Positive for arthralgias.        Lateral chest call pain from fractured ribs   Neurological: Negative for dizziness, weakness and light-headedness.   Psychiatric/Behavioral: Negative for confusion.     Objective:     Vital Signs (Most Recent):  Temp: 97.5 °F (36.4 °C) (08/06/17 1156)  Pulse: 86 (08/06/17 1156)  Resp: 20 (08/06/17 1156)  BP: 104/62 (08/06/17 1156)  SpO2: (!) 83 % (08/06/17 1156) Vital Signs (24h Range):  Temp:  [97.4 °F (36.3 °C)-99 °F (37.2 °C)] 97.5 °F (36.4 °C)  Pulse:  [67-86] 86  Resp:  [18-20] 20  SpO2:  [82 %-98 %] 83 %  BP: ()/(52-66) 104/62     Weight: 119.7 kg (264 lb)  Body mass index is 36.82 kg/m².    Intake/Output Summary (Last 24 hours) at 08/06/17 1420  Last data filed at 08/06/17 1200   Gross per 24 hour   Intake          1547.33 ml   Output             2140 ml   Net          -592.67 ml      Physical Exam   Constitutional: He is oriented to person, place, and time. He appears well-developed and well-nourished. No distress.   Eyes: EOM are normal.   Neck: Neck supple.   Cardiovascular: Normal rate, regular rhythm, normal heart sounds  and intact distal pulses.    Pulmonary/Chest: Effort normal. No respiratory distress. He has no wheezes.   Decreased BS bilateral bases; no significant pain to palpation of fractured ribs from when patient fell   Abdominal: Soft. Bowel sounds are normal. He exhibits distension. There is no tenderness.   Musculoskeletal: He exhibits edema (2+ LE edema bilaterally).   Edema improving from admit   Neurological: He is alert and oriented to person, place, and time. No cranial nerve deficit.   Skin: Skin is warm and dry. Capillary refill takes less than 2 seconds.       Significant Labs:   A1C:   Recent Labs  Lab 07/14/17  0353 08/03/17  0436   HGBA1C 13.1* 8.8*     CBC: No results for input(s): WBC, HGB, HCT, PLT in the last 48 hours.  CMP:   Recent Labs  Lab 08/05/17  0418 08/06/17  0341    140   K 3.2* 3.1*    101   CO2 29 30*    93   BUN 18 19   CREATININE 0.9 1.0   CALCIUM 8.5* 8.4*   PROT 6.2 6.2   ALBUMIN 3.0* 3.0*   BILITOT 3.1* 2.6*   ALKPHOS 87 84   AST 34 32   ALT 51* 44   ANIONGAP 10 9   EGFRNONAA >60.0 >60.0     POCT Glucose:   Recent Labs  Lab 08/05/17 2001 08/06/17  0732 08/06/17  1159   POCTGLUCOSE 158* 102 119*         Assessment/Plan:      * Acute on chronic respiratory failure with hypoxia and hypercapnia    - multifactorial with pulm HTN, RH failure, COPD, HATTIE, OHS  - Hypoxic at baseline w/ chronic supplemental O2 requirement (4-5 liters) and Trilogy at night, transferred for pulm-HTN eval.   - Continued guaifenesin, DuoNeb q6 and Budesonide q12.  - repeat ECHO showed normal EF and elevated estimated PASP, but was unable to accurately determine PA pressure due to inadequate TR jet.  - pulmonology and HTS consulted, appreciate recs  - increased lasix gtt from 10--> 20 mg/hr, continuing to diurese   - RHC 8/4 showed moderately reduced CO/CI on 5L NC O2 with no inotrope, mild to moderate elevated in right and mild elevation in left sided filling pressure, and severe pulmonary  hypertension  - cont lasix gtt and wean O2 as tolerated        Pulmonary HTN    - as above  - RHC on 8/4 showed severe PH        Chronic obstructive pulmonary disease with acute exacerbation    - Spiriva  - breo inhaler  - Duo-nebs prn  - may likely need PFTs to assess for severity of COPD - possibly as outpatient        Coronary artery disease involving native coronary artery of native heart without angina pectoris    - Troponemia prior to transfer thought to be 2/2 to demand ischemia  - cont isosorbide mononitrate, ranolazine, ASA and atorvastatin.   - pt reports stent placement in 5/2016. Will obtain outside records to determine the need for continuing plavix  - has had chest pain during this admission c/w previous angina but may be 2/2 rib fractures; SL nitroglycerin PRN    - need to further investigate necessity for isosorbide and ranolazine - asked patient for wife to bring med bottles from home        Morbid obesity with alveolar hypoventilation    BiPAP QHS.         HATTIE on CPAP    BiPAP QHS.        Paroxysmal atrial fibrillation    - CHADS VASC 3  - Continue home  digoxin   - not currently receiving amiodarone - currently in sinus rhythm, will reassess amiodarone necessity   - restarted warfarin following RHC on 8/4  - increase warfarin to 10 mg today given subtherapeutic inr  - will consider starting patient on a NOAC/ DOAC instead of warfarin         Type 2 diabetes mellitus with hyperglycemia    - Detemir to 15 units BID w/ SSI - adequate control  - A1C has overall decreased from 13 -> to 8 over 1 mo time span         Rib fractures    - pt with multiple L rib fractures which can be seen on CTA 7/24; pt reports falling on O2 tank  - stopped IV dilaudid (please avoid)  - scheduled iboprofen  - oxycodone PRN        Peripheral vascular disease    - continuing ASA and atorvastatin         Chronic hepatitis C    [place] [facility] [place] in discussion regarding treatment.  Appears that his lung  disease is the ultimately limiting factor for treatment (his concerns regarding current treatments and effects on pulmonary status, as well as high resistance to of hepC strain to currently available regimens).  Patient scheduled to f/u with Hepatology August 2017 with Dr. Lopez.         Obesity due to excess calories    - 1200 demetria diabetic diet        Hypokalemia    - replace with PO K+ supplements given lasix gtt diuresis, goal K+ of 4  - placed on daily K+ of 40 mEq, please stop when off the lasix gtt            VTE Risk Mitigation         Ordered     warfarin (COUMADIN) tablet 10 mg  Daily     Route:  Oral        08/06/17 1004              Mary Mccallum MD  Department of Hospital Medicine   [facility]

## 2017-08-07 LAB
ALBUMIN SERPL BCP-MCNC: 3 G/DL
ALP SERPL-CCNC: 88 U/L
ALT SERPL W/O P-5'-P-CCNC: 38 U/L
ANION GAP SERPL CALC-SCNC: 11 MMOL/L
AST SERPL-CCNC: 28 U/L
BASOPHILS # BLD AUTO: 0.04 K/UL
BASOPHILS NFR BLD: 0.5 %
BILIRUB SERPL-MCNC: 2.4 MG/DL
BUN SERPL-MCNC: 18 MG/DL
CALCIUM SERPL-MCNC: 8.6 MG/DL
CHLORIDE SERPL-SCNC: 102 MMOL/L
CO2 SERPL-SCNC: 29 MMOL/L
CREAT SERPL-MCNC: 1.1 MG/DL
DIFFERENTIAL METHOD: ABNORMAL
EOSINOPHIL # BLD AUTO: 0.1 K/UL
EOSINOPHIL NFR BLD: 1.4 %
ERYTHROCYTE [DISTWIDTH] IN BLOOD BY AUTOMATED COUNT: 20.2 %
EST. GFR  (AFRICAN AMERICAN): >60 ML/MIN/1.73 M^2
EST. GFR  (NON AFRICAN AMERICAN): >60 ML/MIN/1.73 M^2
GLUCOSE SERPL-MCNC: 94 MG/DL
HCT VFR BLD AUTO: 43.7 %
HGB BLD-MCNC: 14.4 G/DL
INR PPP: 1.8
LYMPHOCYTES # BLD AUTO: 2 K/UL
LYMPHOCYTES NFR BLD: 25.8 %
MAGNESIUM SERPL-MCNC: 1.8 MG/DL
MCH RBC QN AUTO: 29.6 PG
MCHC RBC AUTO-ENTMCNC: 33 G/DL
MCV RBC AUTO: 90 FL
MONOCYTES # BLD AUTO: 0.8 K/UL
MONOCYTES NFR BLD: 10.5 %
NEUTROPHILS # BLD AUTO: 4.7 K/UL
NEUTROPHILS NFR BLD: 61.3 %
PLATELET # BLD AUTO: 187 K/UL
PMV BLD AUTO: 9.6 FL
POCT GLUCOSE: 107 MG/DL (ref 70–110)
POCT GLUCOSE: 97 MG/DL (ref 70–110)
POCT GLUCOSE: 98 MG/DL (ref 70–110)
POTASSIUM SERPL-SCNC: 3.5 MMOL/L
PROT SERPL-MCNC: 6.1 G/DL
PROTHROMBIN TIME: 18.4 SEC
RBC # BLD AUTO: 4.86 M/UL
SODIUM SERPL-SCNC: 142 MMOL/L
WBC # BLD AUTO: 7.64 K/UL

## 2017-08-07 PROCEDURE — 11000001 HC ACUTE MED/SURG PRIVATE ROOM

## 2017-08-07 PROCEDURE — 99900035 HC TECH TIME PER 15 MIN (STAT)

## 2017-08-07 PROCEDURE — 25000242 PHARM REV CODE 250 ALT 637 W/ HCPCS: Performed by: HOSPITALIST

## 2017-08-07 PROCEDURE — 97803 MED NUTRITION INDIV SUBSEQ: CPT | Performed by: NUTRITIONIST

## 2017-08-07 PROCEDURE — 97165 OT EVAL LOW COMPLEX 30 MIN: CPT

## 2017-08-07 PROCEDURE — 97161 PT EVAL LOW COMPLEX 20 MIN: CPT

## 2017-08-07 PROCEDURE — 25000003 PHARM REV CODE 250: Performed by: INTERNAL MEDICINE

## 2017-08-07 PROCEDURE — 85025 COMPLETE CBC W/AUTO DIFF WBC: CPT

## 2017-08-07 PROCEDURE — 63600175 PHARM REV CODE 636 W HCPCS: Performed by: HOSPITALIST

## 2017-08-07 PROCEDURE — 83735 ASSAY OF MAGNESIUM: CPT

## 2017-08-07 PROCEDURE — 25000003 PHARM REV CODE 250: Performed by: HOSPITALIST

## 2017-08-07 PROCEDURE — 94664 DEMO&/EVAL PT USE INHALER: CPT

## 2017-08-07 PROCEDURE — 63600175 PHARM REV CODE 636 W HCPCS: Performed by: STUDENT IN AN ORGANIZED HEALTH CARE EDUCATION/TRAINING PROGRAM

## 2017-08-07 PROCEDURE — 99232 SBSQ HOSP IP/OBS MODERATE 35: CPT | Mod: ,,, | Performed by: INTERNAL MEDICINE

## 2017-08-07 PROCEDURE — 85610 PROTHROMBIN TIME: CPT

## 2017-08-07 PROCEDURE — 99232 SBSQ HOSP IP/OBS MODERATE 35: CPT | Mod: GC,,, | Performed by: HOSPITALIST

## 2017-08-07 PROCEDURE — 80053 COMPREHEN METABOLIC PANEL: CPT

## 2017-08-07 PROCEDURE — 27000173 HC ACAPELLA DEVICE DH OR DM

## 2017-08-07 PROCEDURE — 36415 COLL VENOUS BLD VENIPUNCTURE: CPT

## 2017-08-07 RX ORDER — FUROSEMIDE 40 MG/1
80 TABLET ORAL EVERY 8 HOURS
Status: DISCONTINUED | OUTPATIENT
Start: 2017-08-07 | End: 2017-08-07

## 2017-08-07 RX ORDER — FUROSEMIDE 10 MG/ML
80 INJECTION INTRAMUSCULAR; INTRAVENOUS 3 TIMES DAILY
Status: DISCONTINUED | OUTPATIENT
Start: 2017-08-07 | End: 2017-08-08

## 2017-08-07 RX ADMIN — INSULIN DETEMIR 15 UNITS: 100 INJECTION, SOLUTION SUBCUTANEOUS at 09:08

## 2017-08-07 RX ADMIN — POTASSIUM CHLORIDE 40 MEQ: 1500 TABLET, EXTENDED RELEASE ORAL at 08:08

## 2017-08-07 RX ADMIN — TIOTROPIUM BROMIDE 18 MCG: 18 CAPSULE ORAL; RESPIRATORY (INHALATION) at 11:08

## 2017-08-07 RX ADMIN — RANOLAZINE 500 MG: 500 TABLET, FILM COATED, EXTENDED RELEASE ORAL at 08:08

## 2017-08-07 RX ADMIN — FLUTICASONE FUROATE AND VILANTEROL TRIFENATATE 1 PUFF: 100; 25 POWDER RESPIRATORY (INHALATION) at 08:08

## 2017-08-07 RX ADMIN — FLUTICASONE PROPIONATE 2 SPRAY: 50 SPRAY, METERED NASAL at 08:08

## 2017-08-07 RX ADMIN — ISOSORBIDE MONONITRATE 30 MG: 30 TABLET ORAL at 08:08

## 2017-08-07 RX ADMIN — FUROSEMIDE 80 MG: 10 INJECTION, SOLUTION INTRAVENOUS at 09:08

## 2017-08-07 RX ADMIN — PANTOPRAZOLE SODIUM 40 MG: 40 TABLET, DELAYED RELEASE ORAL at 08:08

## 2017-08-07 RX ADMIN — IBUPROFEN 400 MG: 400 TABLET, FILM COATED ORAL at 05:08

## 2017-08-07 RX ADMIN — INSULIN DETEMIR 15 UNITS: 100 INJECTION, SOLUTION SUBCUTANEOUS at 08:08

## 2017-08-07 RX ADMIN — FUROSEMIDE 80 MG: 10 INJECTION, SOLUTION INTRAVENOUS at 01:08

## 2017-08-07 RX ADMIN — ALPRAZOLAM 1 MG: 1 TABLET ORAL at 09:08

## 2017-08-07 RX ADMIN — DIGOXIN 0.12 MG: 125 TABLET ORAL at 08:08

## 2017-08-07 RX ADMIN — GABAPENTIN 600 MG: 300 CAPSULE ORAL at 05:08

## 2017-08-07 RX ADMIN — GABAPENTIN 600 MG: 300 CAPSULE ORAL at 01:08

## 2017-08-07 RX ADMIN — FUROSEMIDE 20 MG/HR: 10 INJECTION, SOLUTION INTRAMUSCULAR; INTRAVENOUS at 01:08

## 2017-08-07 RX ADMIN — GABAPENTIN 600 MG: 300 CAPSULE ORAL at 09:08

## 2017-08-07 RX ADMIN — MAGNESIUM OXIDE TAB 400 MG (241.3 MG ELEMENTAL MG) 400 MG: 400 (241.3 MG) TAB at 08:08

## 2017-08-07 RX ADMIN — MAGNESIUM OXIDE TAB 400 MG (241.3 MG ELEMENTAL MG) 400 MG: 400 (241.3 MG) TAB at 09:08

## 2017-08-07 RX ADMIN — ATORVASTATIN CALCIUM 80 MG: 20 TABLET, FILM COATED ORAL at 08:08

## 2017-08-07 RX ADMIN — WARFARIN SODIUM 10 MG: 5 TABLET ORAL at 05:08

## 2017-08-07 RX ADMIN — TAMSULOSIN HYDROCHLORIDE 0.4 MG: 0.4 CAPSULE ORAL at 08:08

## 2017-08-07 RX ADMIN — ASPIRIN 81 MG: 81 TABLET, COATED ORAL at 08:08

## 2017-08-07 NOTE — SUBJECTIVE & OBJECTIVE
Interval History: NAEON. Pt resting comfortably this morning. UOP 2090cc over last 24 hrs. (- 18.7L since admission). Switched lasix from drip to IV. PFTs ordered.     Review of Systems  Objective:     Vital Signs (Most Recent):  Temp: 97.2 °F (36.2 °C) (08/07/17 0748)  Pulse: 62 (08/07/17 0855)  Resp: 18 (08/07/17 0855)  BP: 125/65 (08/07/17 0748)  SpO2: (!) 90 % (08/07/17 0748) Vital Signs (24h Range):  Temp:  [97.2 °F (36.2 °C)-97.7 °F (36.5 °C)] 97.2 °F (36.2 °C)  Pulse:  [59-89] 62  Resp:  [15-20] 18  SpO2:  [83 %-91 %] 90 %  BP: ()/(57-67) 125/65     Weight: 120.2 kg (265 lb)  Body mass index is 36.96 kg/m².    Intake/Output Summary (Last 24 hours) at 08/07/17 0858  Last data filed at 08/07/17 0526   Gross per 24 hour   Intake          1703.33 ml   Output             2090 ml   Net          -386.67 ml      Physical Exam   Constitutional: He is oriented to person, place, and time. He appears well-developed and well-nourished. No distress.   Eyes: EOM are normal.   Neck: Neck supple.   Cardiovascular: Normal rate, regular rhythm, normal heart sounds and intact distal pulses.    Pulmonary/Chest: Effort normal. No respiratory distress. He has no wheezes.   Decreased BS bilateral bases; no significant pain to palpation of fractured ribs from when patient fell   Abdominal: Soft. Bowel sounds are normal. He exhibits distension. There is no tenderness.   Musculoskeletal: He exhibits edema (2+ LE edema bilaterally).   Edema improving from admit   Neurological: He is alert and oriented to person, place, and time. No cranial nerve deficit.   Skin: Skin is warm and dry. Capillary refill takes less than 2 seconds.       Significant Labs:   CBC:   Recent Labs  Lab 08/07/17  0407   WBC 7.64   HGB 14.4   HCT 43.7        CMP:   Recent Labs  Lab 08/06/17  0341 08/07/17  0407    142   K 3.1* 3.5    102   CO2 30* 29   GLU 93 94   BUN 19 18   CREATININE 1.0 1.1   CALCIUM 8.4* 8.6*   PROT 6.2 6.1   ALBUMIN  3.0* 3.0*   BILITOT 2.6* 2.4*   ALKPHOS 84 88   AST 32 28   ALT 44 38   ANIONGAP 9 11   EGFRNONAA >60.0 >60.0     Coagulation:   Recent Labs  Lab 08/07/17  0407   INR 1.8*     POCT Glucose:   Recent Labs  Lab 08/06/17  1730 08/06/17 2003 08/07/17  0740   POCTGLUCOSE 109 111* 98     All pertinent labs within the past 24 hours have been reviewed.    Significant Imaging: I have reviewed all pertinent imaging results/findings within the past 24 hours.

## 2017-08-07 NOTE — PLAN OF CARE
Problem: Patient Care Overview  Goal: Plan of Care Review  Outcome: Ongoing (interventions implemented as appropriate)  POC review with pt and wife,no fall,didnt request pain med so far,infrequent cough,wears own bipap at HS,blood sugar WNL at hs levemir given. Reinforcement given on fluid restriction to pt and wife, pt non-compliance noted.

## 2017-08-07 NOTE — PROGRESS NOTES
"[facility]  Adult Nutrition  Progress Note     SUMMARY      Recommendations     Recommendation/Intervention:  1. Change diet to 2000 calorie Diabetic   2. Honor food requests/preferences  3.  RD to follow   Goals: Pt to meet >85% of EEN/EPN  Nutrition Goal Status:Ongoing        Reason for Assessment     Reason for Assessment: RD follow-up  Diagnosis: other (see comments) (Acute on Chronic Respiratory Failure with Hypoxia)  Relevent Medical History: CAD, Angina, A-fib., Hypoxia, Hypercholesterolemia, Hypertensive Heart Disease, Atherosclerosis, COPD, Neuropathy, Hepatitis C.        Nutrition Discharge Planning: Home on PO diet with adequate intake  Nutrition Prescription Ordered     Current Diet Order: Diabetic 1200 kcals; 1500ml FR  Nutrition Order Comments:  Spoke with patient and spouse at bedside.  Appetite good consuming % of meals.  Pt educated on diabetic diet from previous RD interactions.         Evaluation of Received Nutrients/Fluid Intake      Energy Calories Required: meeting needs      Protein Required: meeting needs      Fluid Required: meeting needs     Tolerance: tolerating  % Intake of Estimated Energy Needs: 75 - 100 %  % Meal Intake: 100%     Nutrition Risk Screen     Nutrition Risk Screen: no indicators present     Labs/Tests/Procedures/Meds        Pertinent Labs Reviewed: reviewed     Pertinent Medications Reviewed: reviewed        Physical Findings     Overall Physical Appearance: obese       Anthropometrics     Temp: 97.5 °F (36.4 °C)     Height: 5' 11" (180.3 cm)  Weight Method: Bed Scale  Weight: 132.5 kg (292 lb)  Ideal Body Weight (IBW), Male: 172 lb     % Ideal Body Weight, Male (lb): 169.77 lb     BMI (Calculated): 40.8  BMI Grade: greater than 40 - morbid obesity      Estimated/Assessed Needs     Weight Used For Calorie Calculations: 132.5 kg (292 lb)      Energy Calorie Requirements (kcal): 2144 kcal.  Energy Need Method: [place]-St Jeor (= 2144 kcal.)        RMR " ([place]-St. Jeor Equation): 2141.63           Protein Requirements: 106g.  (0.8g. pro./kg. ABW)        Fluid Need Method: RDA Method           RDA Method (mL): 2144                Assessment and Plan         Obese     Related to (etiology):   Excess energy intake, physical inactivity, & decreased energy intake     Signs and Symptoms (as evidenced by):   BMI of 40.8 and increased body adiposity.      Interventions/Recommendations (treatment strategy):  Will give weight loss education at f/u visit.      Nutrition Diagnosis Status:   Continues                Monitor and Evaluation     Food and Nutrient Intake: food and beverage intake  Food and Nutrient Adminstration: diet order  Knowledge/Beliefs/Attitudes: food and nutrition knowledge/skill     Anthropometric Measurements: weight, weight change        Nutrition Follow-Up     RD Follow-up?: Yes  Follow up x1/week

## 2017-08-07 NOTE — PT/OT/SLP EVAL
Occupational Therapy  Evaluation    Shaan Brown   MRN: [id number]   Admitting Diagnosis: Acute on chronic respiratory failure with hypoxia and hypercapnia    OT Date of Treatment: 08/07/17   OT Start Time: 1440  OT Stop Time: 1449  OT Total Time (min): 9 min    Billable Minutes:  Evaluation 9 minutes    Diagnosis: Acute on chronic respiratory failure with hypoxia and hypercapnia       Past Medical History:   Diagnosis Date    Angina, class III     Atherosclerosis of lower extremity with claudication     Bilateral carotid artery stenosis     CAD (coronary artery disease)     CAD (coronary artery disease) of artery bypass graft     Chronic hepatitis C     COPD (chronic obstructive pulmonary disease)     Hepatitis     HHD (hypertensive heart disease)     Hypercholesteremia     Hypoxia     Neuropathy, lower extremity     Obstructive sleep apnea     PAF (paroxysmal atrial fibrillation)     Presence of stent in artery     Syncope       Past Surgical History:   Procedure Laterality Date    COLONOSCOPY      CORONARY ARTERY BYPASS GRAFT      stent in artery      UPPER GASTROINTESTINAL ENDOSCOPY       General Precautions: Standard, fall  Orthopedic Precautions: N/A  Braces: N/A     Patient History:  Living Environment  Lives With: spouse  Living Arrangements: mobile home  Home Accessibility: stairs to enter home  Number of Stairs to Enter Home: 4  Stair Railings at Home: outside, present at both sides  Transportation Available: car, family or friend will provide  Living Environment Comment: Pt lives in a mobile home w/ spouse and 4 steps to enter home and B/L HR. PLOF indep and on 2L O2 w/ PRN increase   Equipment Currently Used at Home: raised toilet, oxygen, cane, straight, walker, standard    Prior level of function:   Bed Mobility/Transfers: independent  Grooming: independent  Bathing: independent  Upper Body Dressing: independent  Lower Body Dressing: independent  Toileting: independent  Home Management  Skills: independent        Dominant hand: right    Subjective:  Communicated with nurse prior to session.  Pt agreeable to skilled OT services.  Chief Complaint: sob  Patient/Family stated goals: return home    Pain/Comfort  Pain Rating 1: 0/10  Pain Rating Post-Intervention 1: 0/10    Objective:  Patient found with: oxygen, pulse ox (continuous), telemetry    Cognitive Exam:  Oriented to: Person, Place, Time and Situation  Follows Commands/attention: Follows multistep  commands  Communication: clear/fluent  Memory:  No Deficits noted  Safety awareness/insight to disability: intact  Coping skills/emotional control: Appropriate to situation    Visual/perceptual:  Intact    Physical Exam:  Postural examination/scapula alignment: Rounded shoulder  Skin integrity: Visible skin intact  Edema: None noted     Sensation:   Intact    Upper Extremity Range of Motion:  Right Upper Extremity: WFL  Left Upper Extremity: WFL    Upper Extremity Strength:  Right Upper Extremity: WFL  Left Upper Extremity: WFL   Strength: WFL    Fine motor coordination:   Intact    Gross motor coordination: WFL    Functional Mobility:  Bed Mobility:       Transfers:  Sit <> Stand Assistance: Stand By Assistance  Sit <> Stand Assistive Device: No Assistive Device  Bed <> Chair Technique: Stand Pivot  Bed <> Chair Transfer Assistance: Stand By Assistance  Bed <> Chair Assistive Device: No Assistive Device    Functional Ambulation: Pt ambulated 80 ft at sba on 6 L of O2. Pt 1 mod LOB requiring steadying assist.     Activities of Daily Living:  UE Dressing Level of Assistance: Set-up Assistance (donned gown as robe)  LE Dressing Level of Assistance: Maximum assistance (doneed/doffed socks)    Balance:   Static Sit: GOOD-: Takes MODERATE challenges from all directions but inconsistently  Dynamic Sit: GOOD-: Maintains balance through MODERATE excursions of active trunk movement,     Static Stand: GOOD-: Takes MODERATE challenges from all directions  "inconsistently  Dynamic stand: FAIR+: Needs CLOSE SUPERVISION during gait and is able to right self with minor LOB    Therapeutic Activities and Exercises:  Pt educated on POC  Pt educated on energy conservation technique of pursed lipped breathing.    AM-PAC 6 CLICK ADL  How much help from another person does this patient currently need?  1 = Unable, Total/Dependent Assistance  2 = A lot, Maximum/Moderate Assistance  3 = A little, Minimum/Contact Guard/Supervision  4 = None, Modified [place]/Independent    Putting on and taking off regular lower body clothing? : 2  Bathing (including washing, rinsing, drying)?: 3  Toileting, which includes using toilet, bedpan, or urinal? : 3  Putting on and taking off regular upper body clothing?: 3  Taking care of personal grooming such as brushing teeth?: 4  Eating meals?: 4  Total Score: 19    AM-PAC Raw Score CMS "G-Code Modifier Level of Impairment Assistance   6 % Total / Unable   7 - 9 CM 80 - 100% Maximal Assist   10-14 CL 60 - 80% Moderate Assist   15 - 19 CK 40 - 60% Moderate Assist   20 - 22 CJ 20 - 40% Minimal Assist   23 CI 1-20% SBA / CGA   24 CH 0% Independent/ Mod I       Patient left up in chair with call button in reach and family member present    Assessment:  Shaan Brown is a 62 y.o. male with a medical diagnosis of Acute on chronic respiratory failure with hypoxia and hypercapnia and presents with impairments listed below. Pt displayed decreased endurance w/ ambulation. Pt would benefit from skilled OT services to improve independence and overall occupational functioning.    Rehab identified problem list/impairments: Rehab identified problem list/impairments: weakness, impaired endurance, impaired self care skills, impaired functional mobilty, gait instability, impaired balance, decreased lower extremity function, impaired cardiopulmonary response to activity    Rehab potential is good.    Activity tolerance: Good    Discharge recommendations: " Discharge Facility/Level Of Care Needs: home with home health     Barriers to discharge: Barriers to Discharge: Inaccessible home environment    Equipment recommendations: walker, rolling     GOALS:    Occupational Therapy Goals        Problem: Occupational Therapy Goal    Goal Priority Disciplines Outcome Interventions   Occupational Therapy Goal     OT, PT/OT     Description:  Goals to be met by: 8/15/2017     Patient will increase functional independence with ADLs by performing:    UE Dressing with [place].  LE Dressing with Moderate Assistance.  Grooming while standing with Stand-by Assistance.  Toileting from toilet with Stand-by Assistance for hygiene and clothing management.   Toilet transfer to toilet with Stand-by Assistance.                      PLAN:  Patient to be seen 4 x/week to address the above listed problems via self-care/home management, therapeutic activities, therapeutic exercises  Plan of Care expires: 09/07/17  Plan of Care reviewed with: patient, spouse    Sebastian Chan OT  08/07/2017

## 2017-08-07 NOTE — PLAN OF CARE
Problem: Patient Care Overview  Goal: Plan of Care Review  Outcome: Ongoing (interventions implemented as appropriate)  POC reviewed with pt and family.  Pt verbalized understanding.  Questions and concerns addressed.  Pt's lasix drip discontinued and placed on 80mg IV BID, remains on 5L NC with dyspnea on exertion, and did participate in ambulating with physical therapy today.   Pt progressing toward goals, will continue to monitor.  See flowsheets for full assessment and VS info.

## 2017-08-07 NOTE — PROGRESS NOTES
[facility] Hospital Medicine  Progress Note    Patient Name: Shaan Brown  MRN: [id number]  Patient Class: IP- Inpatient   Admission Date: 7/27/2017  Length of Stay: 11 days  Attending Physician: Donnie Rueda MD  Primary Care Provider: Mendel Ferguson Iii, MD    [facility] Medicine Team: [facility] MED 1 Riley Castellano MD    Subjective:     Principal Problem:Acute on chronic respiratory failure with hypoxia and hypercapnia    HPI:  Shaan Brown is a 62-year-old male w/ chronic mixed hypercapnia and hypoxemia 2/2 COPD, HFpEF, HATTIE and obesity hypoventilation who's on 4-5 liters of continuous supplemental O2 (SpO2 typically in the low 90s-80s)  w/ Trilogy at night. He presented to [facility] on 7/13 via EMS for worsening hypoxia and altered mental status. Initially admitted to the ICU. Worsening hypoxia was thought to be 2/2 to pulmonary HTN w/ PA pressures of 71. Bubble study was negative for shunt. Pt was also started on therapeutic Lovenox and was being bridged to warfarin for suspected PE, despite negative CTA. Finished abx treatment for RLL PNA. Diuretics were held 2/2 to hypotension. Pt has what looks to be a NSTEMI 2/2 to demand, however patient was not tolerate of beta-blocker therapy 2/2 to hypoxia. No improvement in hypoxia despite steroids. Transferred to [facility] for evaluation by pulmonology for pulmonary HTN.   On arrive, pt was hypoxic on 5 liters of supplemental O2. He was complaining of lower back pain.     Hospital Course:  Transferred from [facility] to [facility] IM-1 service for evaluation of worsening pulmonary HTN. He was continued on scheduled duonebs, budesonide full dose lovenox. Diuresis with lasix 40 IV BID was started. Pulmonology was consulted and recommended continued diuresis and aerosol treatments, heart transplant consult for PHtn, and stopping full dose AC. Heart transplant was consulted and recommended repeating the ECHO following  aggressive diuresis. Pt c/o L chest wall pain that he reports began after he fell onto an O2 container. On review of CTA from 7/24, pt has multiple fractured ribs on L side; PRN pain medication ordered. He also reported episodes of chest pain during this admission which were consistent with previous episodes of angina; workup not concerning for infarction. Lasix IVP switched to lasix gtt to improve diuresis. ECHO 7/31 showed normal EF and elevated estimated PASP, but was unable to accurately determine PA pressure due to inadequate TR jet. Continued on lasix gtt; rate increased from 10 cc/hr to 20cc/hr for more adequate diuresis. RHC done on 8/4 showed moderately reduced CO/CI, mild to moderate elevated in right and mild elevation in left sided filling pressure and severe pulmonary hypertension. Deescalated lasix from drip to IV after adequate diuresis.     Interval History: NAEON. Pt resting comfortably this morning. UOP 2090cc over last 24 hrs. (- 18.7L since admission). Switched lasix from drip to IV. PFTs ordered.     Review of Systems  Objective:     Vital Signs (Most Recent):  Temp: 97.2 °F (36.2 °C) (08/07/17 0748)  Pulse: 62 (08/07/17 0855)  Resp: 18 (08/07/17 0855)  BP: 125/65 (08/07/17 0748)  SpO2: (!) 90 % (08/07/17 0748) Vital Signs (24h Range):  Temp:  [97.2 °F (36.2 °C)-97.7 °F (36.5 °C)] 97.2 °F (36.2 °C)  Pulse:  [59-89] 62  Resp:  [15-20] 18  SpO2:  [83 %-91 %] 90 %  BP: ()/(57-67) 125/65     Weight: 120.2 kg (265 lb)  Body mass index is 36.96 kg/m².    Intake/Output Summary (Last 24 hours) at 08/07/17 0858  Last data filed at 08/07/17 0526   Gross per 24 hour   Intake          1703.33 ml   Output             2090 ml   Net          -386.67 ml      Physical Exam   Constitutional: He is oriented to person, place, and time. He appears well-developed and well-nourished. No distress.   Eyes: EOM are normal.   Neck: Neck supple.   Cardiovascular: Normal rate, regular rhythm, normal heart sounds and  intact distal pulses.    Pulmonary/Chest: Effort normal. No respiratory distress. He has no wheezes.   Decreased BS bilateral bases; no significant pain to palpation of fractured ribs from when patient fell   Abdominal: Soft. Bowel sounds are normal. He exhibits distension. There is no tenderness.   Musculoskeletal: He exhibits edema (2+ LE edema bilaterally).   Edema improving from admit   Neurological: He is alert and oriented to person, place, and time. No cranial nerve deficit.   Skin: Skin is warm and dry. Capillary refill takes less than 2 seconds.       Significant Labs:   CBC:   Recent Labs  Lab 08/07/17  0407   WBC 7.64   HGB 14.4   HCT 43.7        CMP:   Recent Labs  Lab 08/06/17  0341 08/07/17  0407    142   K 3.1* 3.5    102   CO2 30* 29   GLU 93 94   BUN 19 18   CREATININE 1.0 1.1   CALCIUM 8.4* 8.6*   PROT 6.2 6.1   ALBUMIN 3.0* 3.0*   BILITOT 2.6* 2.4*   ALKPHOS 84 88   AST 32 28   ALT 44 38   ANIONGAP 9 11   EGFRNONAA >60.0 >60.0     Coagulation:   Recent Labs  Lab 08/07/17 0407   INR 1.8*     POCT Glucose:   Recent Labs  Lab 08/06/17  1730 08/06/17 2003 08/07/17  0740   POCTGLUCOSE 109 111* 98     All pertinent labs within the past 24 hours have been reviewed.    Significant Imaging: I have reviewed all pertinent imaging results/findings within the past 24 hours.    Assessment/Plan:      * Acute on chronic respiratory failure with hypoxia and hypercapnia    - multifactorial with pulm HTN, RH failure, COPD, HATTIE, OHS  - Hypoxic at baseline w/ chronic supplemental O2 requirement (4-5 liters) and Trilogy at night, transferred for pulm-HTN eval.   - Continued guaifenesin, DuoNeb q6 and Budesonide q12.  - repeat ECHO showed normal EF and elevated estimated PASP, but was unable to accurately determine PA pressure due to inadequate TR jet.  - pulmonology and HTS consulted, appreciate recs  - RHC 8/4 showed moderately reduced CO/CI on 5L NC O2 with no inotrope, mild to moderate elevated  in right and mild elevation in left sided filling pressure, and severe pulmonary hypertension  - lasix gtt switched to IV, will likely transition to PO tomorrow  - PFTs ordered        Coronary artery disease involving native coronary artery of native heart without angina pectoris    - Troponemia prior to transfer thought to be 2/2 to demand ischemia  - cont isosorbide mononitrate, ranolazine, ASA and atorvastatin.   - pt reports stent placement in 5/2016. Will obtain outside records to determine the need for continuing plavix  - has had chest pain during this admission c/w previous angina but may be 2/2 rib fractures; SL nitroglycerin PRN  - stopped ranolazine given dizziness with standing; will contact pt's cardiologist (Dr. Pathak) and attempt to simplify medication regimen        Chronic obstructive pulmonary disease with acute exacerbation    - Spiriva  - breo inhaler  - Duo-nebs prn  - PFTs ordered to assess for severity of COPD        Paroxysmal atrial fibrillation    - CHADS VASC 3  - Continue digoxin   - not currently receiving amiodarone - currently in sinus rhythm, will reassess amiodarone necessity   - restarted warfarin following RHC on 8/4; increased dose to 10 mg given subtherapeutic INR  - will consider starting patient on a NOAC/ DOAC instead of warfarin         Type 2 diabetes mellitus with hyperglycemia    - Detemir to 15 units BID w/ SSI - adequate control  - A1C has overall decreased from 13 -> to 8 over 1 mo time span         Peripheral vascular disease    - continuing ASA and atorvastatin         HATTIE on CPAP    BiPAP QHS.        Morbid obesity with alveolar hypoventilation    BiPAP QHS.         Chronic hepatitis C    Patient seen by hepatology at [facility] and in discussion regarding treatment.  Appears that his lung disease is the ultimately limiting factor for treatment (his concerns regarding current treatments and effects on pulmonary status, as well as high resistance to of hepC strain to  currently available regimens).  Patient scheduled to f/u with Hepatology August 2017 with Dr. Lopez.         Rib fractures    - pt with multiple L rib fractures which can be seen on CTA 7/24; pt reports falling on O2 tank  - stopped IV dilaudid (please avoid)  - oxycodone PRN        Pulmonary HTN    - as above  - RHC on 8/4 showed severe PH        Hypokalemia    - replace with PO K+ supplements given lasix diuresis, goal K+ of 4        Obesity due to excess calories    - 1200 demetria diabetic diet          VTE Risk Mitigation         Ordered     warfarin (COUMADIN) tablet 10 mg  Daily     Route:  Oral        08/06/17 1004              Riley Castellano MD  Department of Hospital Medicine   [facility]

## 2017-08-07 NOTE — PLAN OF CARE
Problem: Patient Care Overview  Goal: Plan of Care Review  Outcome: Ongoing (interventions implemented as appropriate)  Recommendations     Recommendation/Intervention:  1. Change diet to 2000 calorie Diabetic   2. Honor food requests/preferences  3.  RD to follow   Goals: Pt to meet >85% of EEN/EPN  Nutrition Goal Status:Ongoing    Assessment and Plan           Obese     Related to (etiology):   Excess energy intake, physical inactivity, & decreased energy intake     Signs and Symptoms (as evidenced by):   BMI of 40.8 and increased body adiposity.      Interventions/Recommendations (treatment strategy):  Will give weight loss education at f/u visit.      Nutrition Diagnosis Status:   Continues

## 2017-08-07 NOTE — ASSESSMENT & PLAN NOTE
- Troponemia prior to transfer thought to be 2/2 to demand ischemia  - cont isosorbide mononitrate, ranolazine, ASA and atorvastatin.   - pt reports stent placement in 5/2016. Will obtain outside records to determine the need for continuing plavix  - has had chest pain during this admission c/w previous angina but may be 2/2 rib fractures; SL nitroglycerin PRN  - stopped ranolazine given dizziness with standing; will contact pt's cardiologist (Dr. Pathak) and attempt to simplify medication regimen

## 2017-08-07 NOTE — PLAN OF CARE
Problem: Physical Therapy Goal  Goal: Physical Therapy Goal  Goals to be met by: 8/15/17     Patient will increase functional independence with mobility by performin. Supine to sit with Modified [place]  2. Sit to supine with Modified [place]  3. Sit to stand transfer with Modified [place]  4. Gait  x 200 feet with Supervision using LRAD  5. Ascend/descend 4 stair with bilateral Handrails Contact Guard Assistance   6. Lower extremity exercise program x 30 reps per handout, with independence    Outcome: Ongoing (interventions implemented as appropriate)  PT eval complete and POC initiated.

## 2017-08-07 NOTE — ASSESSMENT & PLAN NOTE
- CHADS VASC 3  - Continue digoxin   - not currently receiving amiodarone - currently in sinus rhythm, will reassess amiodarone necessity   - restarted warfarin following RHC on 8/4; increased dose to 10 mg given subtherapeutic INR  - will consider starting patient on a NOAC/ DOAC instead of warfarin

## 2017-08-07 NOTE — ASSESSMENT & PLAN NOTE
[place] [facility] and in discussion regarding treatment.  Appears that his lung disease is the ultimately limiting factor for treatment (his concerns regarding current treatments and effects on pulmonary status, as well as high resistance to of hepC strain to currently available regimens).  Patient scheduled to f/u with Hepatology August 2017 with Dr. Lopez.

## 2017-08-07 NOTE — ASSESSMENT & PLAN NOTE
- pt with multiple L rib fractures which can be seen on CTA 7/24; pt reports falling on O2 tank  - stopped IV dilaudid (please avoid)  - oxycodone PRN

## 2017-08-07 NOTE — PT/OT/SLP EVAL
Physical Therapy   Evaluation    Shaan Brown   MRN: [id number]     PT Received On: 08/07/17  PT Start Time: 1440  PT Stop Time: 1450  PT Total Time (min): 10 min    Billable Minutes:  Evaluation 10    Diagnosis: Acute on chronic respiratory failure with hypoxia and hypercapnia    Past Medical History:   Diagnosis Date    Angina, class III     Atherosclerosis of lower extremity with claudication     Bilateral carotid artery stenosis     CAD (coronary artery disease)     CAD (coronary artery disease) of artery bypass graft     Chronic hepatitis C     COPD (chronic obstructive pulmonary disease)     Hepatitis     HHD (hypertensive heart disease)     Hypercholesteremia     Hypoxia     Neuropathy, lower extremity     Obstructive sleep apnea     PAF (paroxysmal atrial fibrillation)     Presence of stent in artery     Syncope      Past Surgical History:   Procedure Laterality Date    COLONOSCOPY      CORONARY ARTERY BYPASS GRAFT      stent in artery      UPPER GASTROINTESTINAL ENDOSCOPY         Referring physician: Mohit   Date referred to PT: 8/7/2017     General Precautions: Standard, fall  Orthopedic Precautions : N/A  Braces: N/A    Do you have any cultural, spiritual, Oriental orthodox conflicts, given your current situation?: none    Patient History:  Lives With: spouse  Living Arrangements: mobile home  Home Accessibility: stairs to enter home  Number of Stairs to Enter Home: 4  Stair Railings at Home: outside, present at both sides  Transportation Available: family or friend will provide  Living Environment Comment: Pt lives in a mobile home with his wife.  Pt has 4 SUZANNA with B railing.  Pt reports being (I) with ADLs and ambulation PTA and wears 2+L of O2 at home  Equipment Currently Used at Home: raised toilet, cane, straight, walker, standard, oxygen    Previous Level of Function:  Ambulation Skills: independent  Transfer Skills: independent  ADL Skills: independent  Work/Leisure Activity:  "independent    Subjective:  Communicated with RN prior to session.    "I was mowing my grass with oxygen"    Chief Complaint: none stated  Patient goals: to return home safely    Pain Rating 1: 0/10   Pain Rating Post-Intervention 1: 0/10    Objective:  Patient found supine in bed     Patient found with: oxygen, pulse ox (continuous), telemetry    Cognitive Exam:  Oriented to: Person, Place, Time and Situation  Follows Commands/attention: Follows multistep  commands  Communication: clear/fluent  Safety awareness/insight to disability: intact    Physical Exam:  Postural examination/scapula alignment: Rounded shoulder    Skin integrity: Visible skin intact  Edema: None noted     Sensation:   Intact     Lower Extremity Range of Motion:  Right Lower Extremity: WFL  Left Lower Extremity: WFL    Lower Extremity Strength:  Right Lower Extremity: WFL  Left Lower Extremity: WFL    Functional Mobility:    Bed Mobility:    Scooting/Bridging: Stand by Assistance  Supine to Sit: Stand by Assistance    Transfers:    Sit <> Stand Assistance: Stand By Assistance  Sit <> Stand Assistive Device: No Assistive Device    Ambulation:    Gait Distance: ~90 feet with slight instability and reported dizziness  Assistance 1: Contact Guard Assistance  Gait Assistive Device: No device  Gait Pattern: reciprocal  Gait Deviation(s): decreased cecile, decreased step length, decreased stride length    Stairs:  Not assessed 2/2 reported dizziness     Balance:   Static Sit: NORMAL: No deviations seen in posture held statically  Dynamic Sit:  GOOD+: Maintains balance through MAXIMAL excursions of active trunk motion  Static Stand: GOOD: Takes MODERATE challenges from all directions  Dynamic stand: FAIR+: Needs CLOSE SUPERVISION during gait and is able to right self with minor LOB    Therapeutic Activities and Exercises:  PT evaluation performed.  White board updated.  Pt educated on role of PT, safety with functional mobility.     Pt safe to transfer " with RN    Patient left up in chair with all lines intact, call button in reach and RN\ notified.    AM-PAC 6 CLICK MOBILITY  1 = Unable, Total/Dependent Assistance  2 = A lot, Maximum/Moderate Assistance  3 = A little, Minimum/Contact Guard/Supervision  4 = None, Modified [place]/Independent    Turning over in bed (including adjusting bedclothes, sheets and blankets)?: 4  Sitting down on and standing up from a chair with arms (e.g., wheelchair, bedside commode, etc.): 3  Moving from lying on back to sitting on the side of the bed?: 3  Moving to and from a bed to a chair (including a wheelchair)?: 3  Need to walk in hospital room?: 3  Climbing 3-5 steps with a railing?: 3  Total Score: 19    AM-PAC Raw Score   CMS G-Code Modifier   Level of Impairment   Assistance     6   CN   100%         Total / Unable   7 - 9   CM   80 - 100%   Maximal Assist     10 - 14   CL   60 - 80%   Moderate Assist     15 - 19   CK   40 - 60%   Moderate Assist     20 - 22   CJ   20 - 40%   Minimal Assist     23   CI   1-20%         SBA / CGA     24 CH   0%   Independent/Modified Independent       Assessment:  Shaan Brown is a 62 y.o. male with a medical diagnosis of Acute on chronic respiratory failure with hypoxia and hypercapnia. He presents with deficits listed below.  Pt tolerated evaluation well and is largely limited by decreased cardiopulmonary response to activity.  Pt is a good candidate for skilled PT services to address the below deficits and to increase functional independence.      Rehab identified problem list/impairments: weakness, impaired endurance, impaired functional mobilty, gait instability, impaired balance, decreased lower extremity function, impaired cardiopulmonary response to activity    Rehab potential is good.    Activity tolerance: Good    Discharge Facility/Level Of Care Needs: home with home health    Barriers to Discharge: Inaccessible home environment    Equipment Needed After Discharge: walker,  rolling    GOALS:    Physical Therapy Goals        Problem: Physical Therapy Goal    Goal Priority Disciplines Outcome Goal Variances Interventions   Physical Therapy Goal     PT/OT, PT Ongoing (interventions implemented as appropriate)     Description:  Goals to be met by: 8/15/17     Patient will increase functional independence with mobility by performin. Supine to sit with Modified [place]  2. Sit to supine with Modified [place]  3. Sit to stand transfer with Modified [place]  4. Gait  x 200 feet with Supervision using LRAD  5. Ascend/descend 4 stair with bilateral Handrails Contact Guard Assistance   6. Lower extremity exercise program x 30 reps per handout, with independence                      PLAN:    Patient to be seen 4 x/week to address the above listed problems via gait training, therapeutic activities, therapeutic exercises  Plan of Care Expires: 17  Plan of Care reviewed with: patient, spouse    Huey Lewis, PT, DPT  2017   [phone]

## 2017-08-07 NOTE — PLAN OF CARE
Problem: Occupational Therapy Goal  Goal: Occupational Therapy Goal  Goals to be met by: 8/15/2017     Patient will increase functional independence with ADLs by performing:    UE Dressing with [place].  LE Dressing with Moderate Assistance.  Grooming while standing with Stand-by Assistance.  Toileting from toilet with Stand-by Assistance for hygiene and clothing management.   Toilet transfer to toilet with Stand-by Assistance.    Initiate OT POC    Comments: Sebastian Chan OTR/L  8/7/2017

## 2017-08-07 NOTE — PROGRESS NOTES
LOS: 11 days     24h events and S:  Shaan Brown is a 62 y.o. male with improved LE swelling and abdominal distention.     O:  Vitals:  Temp: 98.3 °F (36.8 °C) (08/07/17 1600)  Pulse: 80 (08/07/17 1600)  Resp: 18 (08/07/17 1600)  BP: 93/64 (08/07/17 1600)  SpO2: (!) 85 % (08/07/17 1700)    Ins/Outs:    Intake/Output Summary (Last 24 hours) at 08/07/17 1732  Last data filed at 08/07/17 1346   Gross per 24 hour   Intake          1463.33 ml   Output             2200 ml   Net          -736.67 ml       Physical Exam:  General: alert and oriented, conversant  Heart: RRR, no r/g appreciated  Lungs: CTAB  Abdomen: soft, NT, ND, +BS  Vascular: 2+ radial pulse, 2+ edema    Medications:   aspirin  81 mg Oral Daily    atorvastatin  80 mg Oral Daily    digoxin  0.125 mg Oral Daily    fluticasone  2 spray Each Nare Daily    fluticasone-vilanterol  1 puff Inhalation Daily    furosemide  80 mg Intravenous TID    gabapentin  600 mg Oral TID    insulin detemir  15 Units Subcutaneous BID    isosorbide mononitrate  30 mg Oral Daily    lidocaine  1 patch Transdermal Q24H    magnesium oxide  400 mg Oral BID    pantoprazole  40 mg Oral Daily    polyethylene glycol  17 g Oral Daily    tamsulosin  0.4 mg Oral Daily    tiotropium  1 capsule Inhalation Daily    warfarin  10 mg Oral Daily        acetaminophen, albuterol-ipratropium 2.5mg-0.5mg/3mL, alprazolam, dextrose 50%, dextrose 50%, glucagon (human recombinant), glucose, glucose, guaifenesin, hydrOXYzine HCl, insulin aspart, nitroGLYCERIN, oxycodone, oxycodone, ramelteon, sodium chloride    Review of patient's allergies indicates:  No Known Allergies    Labs:  CBC with Diff:     Recent Labs  Lab 08/02/17  0423 08/04/17  0426 08/07/17  0407   WBC 6.78 7.08 7.64   HGB 14.9 15.0 14.4   HCT 45.2 45.0 43.7    178 187   LYMPH 28.9  2.0 31.2  2.2 25.8  2.0   MONO 12.2  0.8 12.3  0.9 10.5  0.8   EOSINOPHIL 2.1 1.6 1.4       COAG:    Recent Labs  Lab 08/05/17  041  08/06/17  0341 08/07/17  0407   INR 1.1 1.2 1.8*       CMP:   Recent Labs  Lab 08/05/17  0418 08/06/17  0341 08/07/17  0407    93 94   CALCIUM 8.5* 8.4* 8.6*   ALBUMIN 3.0* 3.0* 3.0*   PROT 6.2 6.2 6.1    140 142   K 3.2* 3.1* 3.5   CO2 29 30* 29    101 102   BUN 18 19 18   CREATININE 0.9 1.0 1.1   ALKPHOS 87 84 88   ALT 51* 44 38   AST 34 32 28   BILITOT 3.1* 2.6* 2.4*   MG 1.7 1.8 1.8     Estimated Creatinine Clearance: 90.7 mL/min (based on Cr of 1.1).    .No results for input(s): CPK, TROPONINI, MB, BNP in the last 168 hours.      Diagnostic Results:  Ejection Fractions   EF   Date Value Ref Range Status   07/31/2017 55 55 - 65    03/13/2015 65 55 - 65         Infectious disease labs:  Microbiology Results (last 7 days)     ** No results found for the last 168 hours. **        RHC 8/4  RA: 11/14 (10)  RV: 68/1  RVEDP: 7     PW: 16/18 (15)  PA: 81/30 (50)    CONDITION 1:  FICKCI: 1.7600  FICKCO: 4.1100  PULARTSAT: 54.0000  SYSARTSAT: 91.0000    Assessment and Plan:  Shaan Brown is a 62 y.o. gentleman with PMH of HFpEF (EF 55-60%, normal RVSF, PASP >36 mmHg), COPD on home O2 (4-5L NC), morbid obesity, obesity hypoventilation syndrome (OHS), CAD s/p CABG (2000), s/p PCI (05/2016) on ASA/Plavix, PAD s/p PTCA in 2000 on ASA/pletal, paroxysmal AF s/p DCCV (05/2016), hx of cardiac arrest s/p CPR (2016), hepatitis C (not on treatment due to lung disease limiting his life expectancy more than hep. C who presented to [facility] [place] as a transfer from [facility] for management of acute on chronic respiratory failure and PHT management.      PHT  - likely Group III; recommend PFTs  - ensure that HATTIE is controlled with recent sleep study  - would obtain a sleep study if not recent  - recommend decreasing IV Lasix to 80 mg TID   - recommend PT/OT  - recommend stopping NSAIDs  - will continue to follow     MADYSON Robb

## 2017-08-07 NOTE — ASSESSMENT & PLAN NOTE
- multifactorial with pulm HTN, RH failure, COPD, HATTIE, OHS  - Hypoxic at baseline w/ chronic supplemental O2 requirement (4-5 liters) and Trilogy at night, transferred for pulm-HTN eval.   - Continued guaifenesin, DuoNeb q6 and Budesonide q12.  - repeat ECHO showed normal EF and elevated estimated PASP, but was unable to accurately determine PA pressure due to inadequate TR jet.  - pulmonology and HTS consulted, appreciate recs  - RHC 8/4 showed moderately reduced CO/CI on 5L NC O2 with no inotrope, mild to moderate elevated in right and mild elevation in left sided filling pressure, and severe pulmonary hypertension  - lasix gtt switched to IV, will likely transition to PO tomorrow  - PFTs ordered

## 2017-08-08 LAB
ALBUMIN SERPL BCP-MCNC: 3.1 G/DL
ALP SERPL-CCNC: 90 U/L
ALT SERPL W/O P-5'-P-CCNC: 34 U/L
ANION GAP SERPL CALC-SCNC: 11 MMOL/L
AST SERPL-CCNC: 27 U/L
BILIRUB SERPL-MCNC: 1.9 MG/DL
BUN SERPL-MCNC: 18 MG/DL
CALCIUM SERPL-MCNC: 9 MG/DL
CHLORIDE SERPL-SCNC: 100 MMOL/L
CO2 SERPL-SCNC: 31 MMOL/L
CREAT SERPL-MCNC: 1.1 MG/DL
EST. GFR  (AFRICAN AMERICAN): >60 ML/MIN/1.73 M^2
EST. GFR  (NON AFRICAN AMERICAN): >60 ML/MIN/1.73 M^2
GLUCOSE SERPL-MCNC: 86 MG/DL
INR PPP: 2.7
MAGNESIUM SERPL-MCNC: 1.7 MG/DL
POCT GLUCOSE: 104 MG/DL (ref 70–110)
POCT GLUCOSE: 106 MG/DL (ref 70–110)
POCT GLUCOSE: 115 MG/DL (ref 70–110)
POCT GLUCOSE: 120 MG/DL (ref 70–110)
POCT GLUCOSE: 90 MG/DL (ref 70–110)
POTASSIUM SERPL-SCNC: 3.2 MMOL/L
PROT SERPL-MCNC: 6.3 G/DL
PROTHROMBIN TIME: 27.1 SEC
SODIUM SERPL-SCNC: 142 MMOL/L

## 2017-08-08 PROCEDURE — 99900035 HC TECH TIME PER 15 MIN (STAT)

## 2017-08-08 PROCEDURE — 85610 PROTHROMBIN TIME: CPT

## 2017-08-08 PROCEDURE — 25000003 PHARM REV CODE 250: Performed by: INTERNAL MEDICINE

## 2017-08-08 PROCEDURE — 27000173 HC ACAPELLA DEVICE DH OR DM

## 2017-08-08 PROCEDURE — 99232 SBSQ HOSP IP/OBS MODERATE 35: CPT | Mod: GC,,, | Performed by: HOSPITALIST

## 2017-08-08 PROCEDURE — 25000003 PHARM REV CODE 250: Performed by: HOSPITALIST

## 2017-08-08 PROCEDURE — 27000221 HC OXYGEN, UP TO 24 HOURS

## 2017-08-08 PROCEDURE — 63600175 PHARM REV CODE 636 W HCPCS: Performed by: STUDENT IN AN ORGANIZED HEALTH CARE EDUCATION/TRAINING PROGRAM

## 2017-08-08 PROCEDURE — 94664 DEMO&/EVAL PT USE INHALER: CPT

## 2017-08-08 PROCEDURE — 25000003 PHARM REV CODE 250: Performed by: STUDENT IN AN ORGANIZED HEALTH CARE EDUCATION/TRAINING PROGRAM

## 2017-08-08 PROCEDURE — 11000001 HC ACUTE MED/SURG PRIVATE ROOM

## 2017-08-08 PROCEDURE — 83735 ASSAY OF MAGNESIUM: CPT

## 2017-08-08 PROCEDURE — 36415 COLL VENOUS BLD VENIPUNCTURE: CPT

## 2017-08-08 PROCEDURE — 25000242 PHARM REV CODE 250 ALT 637 W/ HCPCS: Performed by: HOSPITALIST

## 2017-08-08 PROCEDURE — 99232 SBSQ HOSP IP/OBS MODERATE 35: CPT | Mod: ,,, | Performed by: INTERNAL MEDICINE

## 2017-08-08 PROCEDURE — 80053 COMPREHEN METABOLIC PANEL: CPT

## 2017-08-08 RX ORDER — FUROSEMIDE 40 MG/1
80 TABLET ORAL 2 TIMES DAILY
Status: DISCONTINUED | OUTPATIENT
Start: 2017-08-08 | End: 2017-08-09 | Stop reason: HOSPADM

## 2017-08-08 RX ORDER — POTASSIUM CHLORIDE 750 MG/1
50 CAPSULE, EXTENDED RELEASE ORAL ONCE
Status: COMPLETED | OUTPATIENT
Start: 2017-08-08 | End: 2017-08-08

## 2017-08-08 RX ADMIN — ISOSORBIDE MONONITRATE 30 MG: 30 TABLET ORAL at 08:08

## 2017-08-08 RX ADMIN — FUROSEMIDE 80 MG: 40 TABLET ORAL at 05:08

## 2017-08-08 RX ADMIN — PANTOPRAZOLE SODIUM 40 MG: 40 TABLET, DELAYED RELEASE ORAL at 08:08

## 2017-08-08 RX ADMIN — POTASSIUM CHLORIDE 50 MEQ: 750 CAPSULE, EXTENDED RELEASE ORAL at 08:08

## 2017-08-08 RX ADMIN — GABAPENTIN 600 MG: 300 CAPSULE ORAL at 05:08

## 2017-08-08 RX ADMIN — INSULIN DETEMIR 15 UNITS: 100 INJECTION, SOLUTION SUBCUTANEOUS at 09:08

## 2017-08-08 RX ADMIN — FLUTICASONE PROPIONATE 2 SPRAY: 50 SPRAY, METERED NASAL at 08:08

## 2017-08-08 RX ADMIN — MAGNESIUM OXIDE TAB 400 MG (241.3 MG ELEMENTAL MG) 400 MG: 400 (241.3 MG) TAB at 08:08

## 2017-08-08 RX ADMIN — DIGOXIN 0.12 MG: 125 TABLET ORAL at 08:08

## 2017-08-08 RX ADMIN — GABAPENTIN 600 MG: 300 CAPSULE ORAL at 03:08

## 2017-08-08 RX ADMIN — TAMSULOSIN HYDROCHLORIDE 0.4 MG: 0.4 CAPSULE ORAL at 08:08

## 2017-08-08 RX ADMIN — FUROSEMIDE 80 MG: 10 INJECTION, SOLUTION INTRAVENOUS at 05:08

## 2017-08-08 RX ADMIN — OXYCODONE HYDROCHLORIDE 10 MG: 5 TABLET ORAL at 09:08

## 2017-08-08 RX ADMIN — ALPRAZOLAM 1 MG: 1 TABLET ORAL at 09:08

## 2017-08-08 RX ADMIN — OXYCODONE HYDROCHLORIDE 10 MG: 5 TABLET ORAL at 06:08

## 2017-08-08 RX ADMIN — FLUTICASONE FUROATE AND VILANTEROL TRIFENATATE 1 PUFF: 100; 25 POWDER RESPIRATORY (INHALATION) at 08:08

## 2017-08-08 RX ADMIN — MAGNESIUM OXIDE TAB 400 MG (241.3 MG ELEMENTAL MG) 400 MG: 400 (241.3 MG) TAB at 09:08

## 2017-08-08 RX ADMIN — ATORVASTATIN CALCIUM 80 MG: 20 TABLET, FILM COATED ORAL at 08:08

## 2017-08-08 RX ADMIN — ASPIRIN 81 MG: 81 TABLET, COATED ORAL at 08:08

## 2017-08-08 RX ADMIN — POLYETHYLENE GLYCOL 3350 17 G: 17 POWDER, FOR SOLUTION ORAL at 08:08

## 2017-08-08 RX ADMIN — GABAPENTIN 600 MG: 300 CAPSULE ORAL at 09:08

## 2017-08-08 RX ADMIN — TIOTROPIUM BROMIDE 18 MCG: 18 CAPSULE ORAL; RESPIRATORY (INHALATION) at 08:08

## 2017-08-08 NOTE — ASSESSMENT & PLAN NOTE
- multifactorial with pulm HTN, RH failure, COPD, HATTIE, OHS  - Hypoxic at baseline w/ chronic supplemental O2 requirement (4-5 liters) and Trilogy at night, transferred for pulm-HTN eval.   - Continued guaifenesin, DuoNeb q6 and Budesonide q12.  - repeat ECHO showed normal EF and elevated estimated PASP, but was unable to accurately determine PA pressure due to inadequate TR jet.  - pulmonology and HTS consulted, appreciate recs  - RHC 8/4 showed moderately reduced CO/CI on 5L NC O2 with no inotrope, mild to moderate elevated in right and mild elevation in left sided filling pressure, and severe pulmonary hypertension  - lasix transitioned to PO  - PFTs ordered

## 2017-08-08 NOTE — PLAN OF CARE
08/08/17 1626   Discharge Reassessment   Assessment Type Discharge Planning Reassessment   Can the patient answer the patient profile reliably? Yes, cognitively intact   How does the patient rate their overall health at the present time? Fair   Describe the patient's ability to walk at the present time. No restrictions   How often would a person be available to care for the patient? Often   Number of comorbid conditions (as recorded on the chart) Three   During the past month, has the patient often been bothered by feeling down, depressed or hopeless? No   During the past month, has the patient often been bothered by little interest or pleasure in doing things? No   Discharge plan remains the same: Yes   Discharge Plan A Home;Home Health   Discharge Plan B Home   Change in patient condition or support system No

## 2017-08-08 NOTE — NURSING
MD on call for IM1 notified PFT with bronchodilator test ca  nnot be done until patient is weaned off of O2 to or on 2/liters N/C.states Rowan in pulmonary lab.

## 2017-08-08 NOTE — PROGRESS NOTES
LOS: 12 days     24h events and S:  Shaan Brown is a 62 y.o. male with no SOB overnight.    O:  Vitals:  Temp: 98 °F (36.7 °C) (08/08/17 1509)  Pulse: 84 (08/08/17 1509)  Resp: 20 (08/08/17 1509)  BP: 111/67 (08/08/17 1509)  SpO2: (!) 86 % (08/08/17 1509)    Ins/Outs:    Intake/Output Summary (Last 24 hours) at 08/08/17 1709  Last data filed at 08/08/17 1500   Gross per 24 hour   Intake              200 ml   Output              650 ml   Net             -450 ml       Physical Exam:  General: alert and oriented, conversant  Heart: RRR, no r/g appreciated  Lungs: CTAB  Abdomen: soft, NT, ND, +BS  Vascular: 2+ radial pulse, 2+ edema (left), no edema right    Medications:   aspirin  81 mg Oral Daily    atorvastatin  80 mg Oral Daily    digoxin  0.125 mg Oral Daily    fluticasone  2 spray Each Nare Daily    fluticasone-vilanterol  1 puff Inhalation Daily    furosemide  80 mg Oral BID    gabapentin  600 mg Oral TID    insulin detemir  15 Units Subcutaneous BID    isosorbide mononitrate  30 mg Oral Daily    lidocaine  1 patch Transdermal Q24H    magnesium oxide  400 mg Oral BID    pantoprazole  40 mg Oral Daily    polyethylene glycol  17 g Oral Daily    tamsulosin  0.4 mg Oral Daily    tiotropium  1 capsule Inhalation Daily        acetaminophen, albuterol-ipratropium 2.5mg-0.5mg/3mL, alprazolam, dextrose 50%, dextrose 50%, glucagon (human recombinant), glucose, glucose, guaifenesin, hydrOXYzine HCl, insulin aspart, nitroGLYCERIN, oxycodone, oxycodone, ramelteon, sodium chloride    Review of patient's allergies indicates:  No Known Allergies    Labs:  CBC with Diff:     Recent Labs  Lab 08/02/17  0423 08/04/17  0426 08/07/17  0407   WBC 6.78 7.08 7.64   HGB 14.9 15.0 14.4   HCT 45.2 45.0 43.7    178 187   LYMPH 28.9  2.0 31.2  2.2 25.8  2.0   MONO 12.2  0.8 12.3  0.9 10.5  0.8   EOSINOPHIL 2.1 1.6 1.4       COAG:    Recent Labs  Lab 08/06/17  0341 08/07/17  0407 08/08/17  0418   INR 1.2 1.8*  2.7*       CMP:   Recent Labs  Lab 08/06/17  0341 08/07/17  0407 08/08/17  0418   GLU 93 94 86   CALCIUM 8.4* 8.6* 9.0   ALBUMIN 3.0* 3.0* 3.1*   PROT 6.2 6.1 6.3    142 142   K 3.1* 3.5 3.2*   CO2 30* 29 31*    102 100   BUN 19 18 18   CREATININE 1.0 1.1 1.1   ALKPHOS 84 88 90   ALT 44 38 34   AST 32 28 27   BILITOT 2.6* 2.4* 1.9*   MG 1.8 1.8 1.7     Estimated Creatinine Clearance: 90.6 mL/min (based on Cr of 1.1).    .No results for input(s): CPK, TROPONINI, MB, BNP in the last 168 hours.      Diagnostic Results:  Ejection Fractions   EF   Date Value Ref Range Status   07/31/2017 55 55 - 65    03/13/2015 65 55 - 65         Infectious disease labs:  Microbiology Results (last 7 days)     ** No results found for the last 168 hours. **          Assessment and Plan:  Shaan Brown is a 62 y.o. gentleman with PMH of HFpEF (EF 55-60%, normal RVSF, PASP >36 mmHg), COPD on home O2 (4-5L NC), morbid obesity, obesity hypoventilation syndrome (OHS), CAD s/p CABG (2000), s/p PCI (05/2016) on ASA/Plavix, PAD s/p PTCA in 2000 on ASA/pletal, paroxysmal AF s/p DCCV (05/2016), hx of cardiac arrest s/p CPR (2016), hepatitis C (not on treatment due to lung disease limiting his life expectancy more than hep. C who presented to [facility] as a transfer from [facility] for management of acute on chronic respiratory failure and PHT management.      PHT  - likely Group III; recommend PFTs  - ensure that HATTIE is controlled with recent sleep study  - would obtain a sleep study if not recent  - recommend transitioning to PO Lasix 80 mg BID  - recommend PT/OT  - recommend avoiding NSAIDs  - will sign off     Prasad Wood MD

## 2017-08-08 NOTE — ASSESSMENT & PLAN NOTE
- CHADS VASC 3  - Continue digoxin   - not currently receiving amiodarone - currently in sinus rhythm, will reassess amiodarone necessity   - restarted warfarin following RHC on 8/4; holding today  - pt cannot afford a NOAC/ DOAC at this time as he is in the insurance donut hole. Plan to continue warfarin until coverage resumes, then have pt contact PCP about starting NOAC

## 2017-08-08 NOTE — PROGRESS NOTES
[facility] Hospital Medicine  Progress Note    Patient Name: Shaan Brown  MRN: [id number]  Patient Class: IP- Inpatient   Admission Date: 7/27/2017  Length of Stay: 12 days  Attending Physician: Clarence Euceda MD  Primary Care Provider: Mendel Ferguson Iii, MD    [facility] Medicine Team: [facility] HOSP MED 1 Riley Castellano MD    Subjective:     Principal Problem:Acute on chronic respiratory failure with hypoxia and hypercapnia    HPI:  Shaan Brown is a 62-year-old male w/ chronic mixed hypercapnia and hypoxemia 2/2 COPD, HFpEF, HATTIE and obesity hypoventilation who's on 4-5 liters of continuous supplemental O2 (SpO2 typically in the low 90s-80s)  w/ Trilogy at night. He presented to [facility] on 7/13 via EMS for worsening hypoxia and altered mental status. Initially admitted to the ICU. Worsening hypoxia was thought to be 2/2 to pulmonary HTN w/ PA pressures of 71. Bubble study was negative for shunt. Pt was also started on therapeutic Lovenox and was being bridged to warfarin for suspected PE, despite negative CTA. Finished abx treatment for RLL PNA. Diuretics were held 2/2 to hypotension. Pt has what looks to be a NSTEMI 2/2 to demand, however patient was not tolerate of beta-blocker therapy 2/2 to hypoxia. No improvement in hypoxia despite steroids. Transferred to [facility] for evaluation by pulmonology for pulmonary HTN.   On arrive, pt was hypoxic on 5 liters of supplemental O2. He was complaining of lower back pain.     Hospital Course:  Transferred from [facility] to [facility]'s IM-1 service for evaluation of worsening pulmonary HTN. He was continued on scheduled duonebs, budesonide full dose lovenox. Diuresis with lasix 40 IV BID was started. Pulmonology was consulted and recommended continued diuresis and aerosol treatments, heart transplant consult for PHtn, and stopping full dose AC. Heart transplant was consulted and recommended repeating the ECHO  following aggressive diuresis. Pt c/o L chest wall pain that he reports began after he fell onto an O2 container. On review of CTA from 7/24, pt has multiple fractured ribs on L side; PRN pain medication ordered. He also reported episodes of chest pain during this admission which were consistent with previous episodes of angina; workup not concerning for infarction. Lasix IVP switched to lasix gtt to improve diuresis. ECHO 7/31 showed normal EF and elevated estimated PASP, but was unable to accurately determine PA pressure due to inadequate TR jet. Continued on lasix gtt; rate increased from 10 cc/hr to 20cc/hr for more adequate diuresis. RHC done on 8/4 showed moderately reduced CO/CI, mild to moderate elevated in right and mild elevation in left sided filling pressure and severe pulmonary hypertension. Deescalated lasix from drip to IV, then to PO after adequate diuresis. PFTs pending.    Interval History: NAEON. Reports a burning bilateral lower extremity pain consistent with his diabetic neuropathy that limited his sleep overnight. Otherwise breathing well, no complaints.     Review of Systems  Objective:     Vital Signs (Most Recent):  Temp: 97.8 °F (36.6 °C) (08/08/17 0740)  Pulse: 75 (08/08/17 0740)  Resp: 20 (08/08/17 0740)  BP: (!) 134/59 (08/08/17 0740)  SpO2: (!) 88 % (08/08/17 0301) Vital Signs (24h Range):  Temp:  [97.6 °F (36.4 °C)-98.7 °F (37.1 °C)] 97.8 °F (36.6 °C)  Pulse:  [62-80] 75  Resp:  [16-20] 20  SpO2:  [84 %-88 %] 88 %  BP: ()/(51-71) 134/59     Weight: 120 kg (264 lb 8 oz)  Body mass index is 36.89 kg/m².    Intake/Output Summary (Last 24 hours) at 08/08/17 0853  Last data filed at 08/07/17 1700   Gross per 24 hour   Intake              480 ml   Output             1500 ml   Net            -1020 ml      Physical Exam   Constitutional: He is oriented to person, place, and time. He appears well-developed and well-nourished. No distress.   Eyes: EOM are normal.   Neck: Neck supple.    Cardiovascular: Normal rate, regular rhythm, normal heart sounds and intact distal pulses.    Pulmonary/Chest: Effort normal. No respiratory distress. He has no wheezes.   Decreased BS bilateral bases; no significant pain to palpation of fractured ribs from when patient fell   Abdominal: Soft. Bowel sounds are normal. He exhibits distension. There is no tenderness.   Musculoskeletal: He exhibits edema (1+ LE edema bilaterally).   Edema improving from admit   Neurological: He is alert and oriented to person, place, and time. No cranial nerve deficit.   Skin: Skin is warm and dry. Capillary refill takes less than 2 seconds.       Significant Labs:   CMP:   Recent Labs  Lab 08/07/17  0407 08/08/17  0418    142   K 3.5 3.2*    100   CO2 29 31*   GLU 94 86   BUN 18 18   CREATININE 1.1 1.1   CALCIUM 8.6* 9.0   PROT 6.1 6.3   ALBUMIN 3.0* 3.1*   BILITOT 2.4* 1.9*   ALKPHOS 88 90   AST 28 27   ALT 38 34   ANIONGAP 11 11   EGFRNONAA >60.0 >60.0     Coagulation:   Recent Labs  Lab 08/08/17  0418   INR 2.7*     POCT Glucose:   Recent Labs  Lab 08/07/17  1110 08/07/17  1705 08/07/17  2124   POCTGLUCOSE 97 107 106     All pertinent labs within the past 24 hours have been reviewed.    Significant Imaging: I have reviewed all pertinent imaging results/findings within the past 24 hours.    Assessment/Plan:      * Acute on chronic respiratory failure with hypoxia and hypercapnia    - multifactorial with pulm HTN, RH failure, COPD, HATTIE, OHS  - Hypoxic at baseline w/ chronic supplemental O2 requirement (4-5 liters) and Trilogy at night, transferred for pulm-HTN eval.   - Continued guaifenesin, DuoNeb q6 and Budesonide q12.  - repeat ECHO showed normal EF and elevated estimated PASP, but was unable to accurately determine PA pressure due to inadequate TR jet.  - pulmonology and HTS consulted, appreciate recs  - RHC 8/4 showed moderately reduced CO/CI on 5L NC O2 with no inotrope, mild to moderate elevated in right and  mild elevation in left sided filling pressure, and severe pulmonary hypertension  - lasix transitioned to PO  - PFTs ordered        Coronary artery disease involving native coronary artery of native heart without angina pectoris    - Troponemia prior to transfer thought to be 2/2 to demand ischemia  - cont isosorbide mononitrate, ASA and atorvastatin.   - pt reports stent placement in 5/2016. Will obtain outside records to determine the need for continuing plavix  - has had chest pain during this admission c/w previous angina but may be 2/2 rib fractures; SL nitroglycerin PRN  - stopped ranolazine given dizziness with standing; will contact pt's cardiologist (Dr. Pathak) and attempt to simplify medication regimen        Chronic obstructive pulmonary disease with acute exacerbation    - Spiriva  - breo inhaler  - Duo-nebs prn  - PFTs ordered to assess for severity of COPD        Paroxysmal atrial fibrillation    - CHADS VASC 3  - Continue digoxin   - not currently receiving amiodarone - currently in sinus rhythm, will reassess amiodarone necessity   - restarted warfarin following RHC on 8/4; holding today  - pt cannot afford a NOAC/ DOAC at this time as he is in the insurance donut hole. Plan to continue warfarin until coverage resumes, then have pt contact PCP about starting NOAC         Type 2 diabetes mellitus with hyperglycemia    - Detemir to 15 units BID w/ SSI - adequate control  - A1C has overall decreased from 13 -> to 8 over 1 mo time span   - 1200 demetria diabetic diet        Peripheral vascular disease    - continuing ASA and atorvastatin         HATTIE on CPAP    BiPAP QHS.        Morbid obesity with alveolar hypoventilation    BiPAP QHS.         Chronic hepatitis C    Patient seen by hepatology at [facility] and in discussion regarding treatment.  Appears that his lung disease is the ultimately limiting factor for treatment (his concerns regarding current treatments and effects on pulmonary status, as well as high  resistance to of hepC strain to currently available regimens).  Patient scheduled to f/u with Hepatology August 2017 with Dr. Lopez.         Rib fractures    - pt with multiple L rib fractures which can be seen on CTA 7/24; pt reports falling on O2 tank  - stopped IV dilaudid (please avoid)  - oxycodone PRN        Pulmonary HTN    - as above  - RHC on 8/4 showed severe PH        Hypokalemia    - replace with PO K+ supplements given lasix diuresis, goal K+ of 4        Obesity due to excess calories    - 1200 demetria diabetic diet          VTE Risk Mitigation     None              Riley Castellano MD  Department of Hospital Medicine   [facility]

## 2017-08-08 NOTE — SUBJECTIVE & OBJECTIVE
Interval History: TIP. Reports a burning bilateral lower extremity pain consistent with his diabetic neuropathy that limited his sleep overnight. Otherwise breathing well, no complaints.     Review of Systems  Objective:     Vital Signs (Most Recent):  Temp: 97.8 °F (36.6 °C) (08/08/17 0740)  Pulse: 75 (08/08/17 0740)  Resp: 20 (08/08/17 0740)  BP: (!) 134/59 (08/08/17 0740)  SpO2: (!) 88 % (08/08/17 0301) Vital Signs (24h Range):  Temp:  [97.6 °F (36.4 °C)-98.7 °F (37.1 °C)] 97.8 °F (36.6 °C)  Pulse:  [62-80] 75  Resp:  [16-20] 20  SpO2:  [84 %-88 %] 88 %  BP: ()/(51-71) 134/59     Weight: 120 kg (264 lb 8 oz)  Body mass index is 36.89 kg/m².    Intake/Output Summary (Last 24 hours) at 08/08/17 0853  Last data filed at 08/07/17 1700   Gross per 24 hour   Intake              480 ml   Output             1500 ml   Net            -1020 ml      Physical Exam   Constitutional: He is oriented to person, place, and time. He appears well-developed and well-nourished. No distress.   Eyes: EOM are normal.   Neck: Neck supple.   Cardiovascular: Normal rate, regular rhythm, normal heart sounds and intact distal pulses.    Pulmonary/Chest: Effort normal. No respiratory distress. He has no wheezes.   Decreased BS bilateral bases; no significant pain to palpation of fractured ribs from when patient fell   Abdominal: Soft. Bowel sounds are normal. He exhibits distension. There is no tenderness.   Musculoskeletal: He exhibits edema (1+ LE edema bilaterally).   Edema improving from admit   Neurological: He is alert and oriented to person, place, and time. No cranial nerve deficit.   Skin: Skin is warm and dry. Capillary refill takes less than 2 seconds.       Significant Labs:   CMP:   Recent Labs  Lab 08/07/17  0407 08/08/17  0418    142   K 3.5 3.2*    100   CO2 29 31*   GLU 94 86   BUN 18 18   CREATININE 1.1 1.1   CALCIUM 8.6* 9.0   PROT 6.1 6.3   ALBUMIN 3.0* 3.1*   BILITOT 2.4* 1.9*   ALKPHOS 88 90   AST 28  27   ALT 38 34   ANIONGAP 11 11   EGFRNONAA >60.0 >60.0     Coagulation:   Recent Labs  Lab 08/08/17  0418   INR 2.7*     POCT Glucose:   Recent Labs  Lab 08/07/17  1110 08/07/17  1705 08/07/17  2124   POCTGLUCOSE 97 107 106     All pertinent labs within the past 24 hours have been reviewed.    Significant Imaging: I have reviewed all pertinent imaging results/findings within the past 24 hours.

## 2017-08-08 NOTE — PLAN OF CARE
Problem: Patient Care Overview  Goal: Plan of Care Review  Outcome: Ongoing (interventions implemented as appropriate)  Pt AAOx4. VSS at this time. No acute events overnight. Hypotensive at baseline. PRN Xanax given which provided moderate relief. 5L NC BLAIR noted. No SOB reported. Possible PFTs 8/8/17. Heat pack applied to lower back for pain relief. POC reviewed with pt and spouse who verbalized understanding.     Problem: Fall Risk (Adult)  Goal: Identify Related Risk Factors and Signs and Symptoms  Related risk factors and signs and symptoms are identified upon initiation of Human Response Clinical Practice Guideline (CPG)   Outcome: Ongoing (interventions implemented as appropriate)  Pt remained free of falls throughout the night. Wife at bedside and is attentive to pts needs. Verbalized understanding to call for assistance with OOB actvities. Safety precautions maintained.

## 2017-08-08 NOTE — ASSESSMENT & PLAN NOTE
- Detemir to 15 units BID w/ SSI - adequate control  - A1C has overall decreased from 13 -> to 8 over 1 mo time span   - 1200 demetria diabetic diet

## 2017-08-08 NOTE — ASSESSMENT & PLAN NOTE
Patient seen by hepatology at [facility] and in discussion regarding treatment.  Appears that his lung disease is the ultimately limiting factor for treatment (his concerns regarding current treatments and effects on pulmonary status, as well as high resistance to of hepC strain to currently available regimens).  Patient scheduled to f/u with Hepatology August 2017 with Dr. Lopez.

## 2017-08-08 NOTE — ASSESSMENT & PLAN NOTE
- Troponemia prior to transfer thought to be 2/2 to demand ischemia  - cont isosorbide mononitrate, ASA and atorvastatin.   - pt reports stent placement in 5/2016. Will obtain outside records to determine the need for continuing plavix  - has had chest pain during this admission c/w previous angina but may be 2/2 rib fractures; SL nitroglycerin PRN  - stopped ranolazine given dizziness with standing; will contact pt's cardiologist (Dr. Pathak) and attempt to simplify medication regimen

## 2017-08-09 VITALS
DIASTOLIC BLOOD PRESSURE: 69 MMHG | SYSTOLIC BLOOD PRESSURE: 110 MMHG | BODY MASS INDEX: 37.03 KG/M2 | OXYGEN SATURATION: 82 % | HEIGHT: 71 IN | TEMPERATURE: 99 F | WEIGHT: 264.5 LBS | RESPIRATION RATE: 20 BRPM | HEART RATE: 76 BPM

## 2017-08-09 PROBLEM — E87.6 HYPOKALEMIA: Status: RESOLVED | Noted: 2017-07-16 | Resolved: 2017-08-09

## 2017-08-09 LAB
ALBUMIN SERPL BCP-MCNC: 2.9 G/DL
ALP SERPL-CCNC: 87 U/L
ALT SERPL W/O P-5'-P-CCNC: 29 U/L
ANION GAP SERPL CALC-SCNC: 13 MMOL/L
AST SERPL-CCNC: 25 U/L
BASOPHILS # BLD AUTO: 0.03 K/UL
BASOPHILS NFR BLD: 0.4 %
BILIRUB SERPL-MCNC: 1.7 MG/DL
BUN SERPL-MCNC: 18 MG/DL
CALCIUM SERPL-MCNC: 8.8 MG/DL
CHLORIDE SERPL-SCNC: 102 MMOL/L
CO2 SERPL-SCNC: 26 MMOL/L
CREAT SERPL-MCNC: 1 MG/DL
DIFFERENTIAL METHOD: ABNORMAL
EOSINOPHIL # BLD AUTO: 0.1 K/UL
EOSINOPHIL NFR BLD: 1.4 %
ERYTHROCYTE [DISTWIDTH] IN BLOOD BY AUTOMATED COUNT: 20 %
EST. GFR  (AFRICAN AMERICAN): >60 ML/MIN/1.73 M^2
EST. GFR  (NON AFRICAN AMERICAN): >60 ML/MIN/1.73 M^2
GLUCOSE SERPL-MCNC: 93 MG/DL
HCT VFR BLD AUTO: 42.1 %
HGB BLD-MCNC: 13.7 G/DL
INR PPP: 2.9
LYMPHOCYTES # BLD AUTO: 2.1 K/UL
LYMPHOCYTES NFR BLD: 26.3 %
MAGNESIUM SERPL-MCNC: 1.8 MG/DL
MCH RBC QN AUTO: 29.7 PG
MCHC RBC AUTO-ENTMCNC: 32.5 G/DL
MCV RBC AUTO: 91 FL
MONOCYTES # BLD AUTO: 0.7 K/UL
MONOCYTES NFR BLD: 9.4 %
NEUTROPHILS # BLD AUTO: 4.9 K/UL
NEUTROPHILS NFR BLD: 62 %
PLATELET # BLD AUTO: 198 K/UL
PMV BLD AUTO: 9.2 FL
POCT GLUCOSE: 102 MG/DL (ref 70–110)
POCT GLUCOSE: 103 MG/DL (ref 70–110)
POTASSIUM SERPL-SCNC: 3.6 MMOL/L
PROT SERPL-MCNC: 5.9 G/DL
PROTHROMBIN TIME: 28.7 SEC
RBC # BLD AUTO: 4.61 M/UL
SODIUM SERPL-SCNC: 141 MMOL/L
WBC # BLD AUTO: 7.86 K/UL

## 2017-08-09 PROCEDURE — 25000003 PHARM REV CODE 250: Performed by: INTERNAL MEDICINE

## 2017-08-09 PROCEDURE — 36415 COLL VENOUS BLD VENIPUNCTURE: CPT

## 2017-08-09 PROCEDURE — 80053 COMPREHEN METABOLIC PANEL: CPT

## 2017-08-09 PROCEDURE — 85610 PROTHROMBIN TIME: CPT

## 2017-08-09 PROCEDURE — 25000003 PHARM REV CODE 250: Performed by: HOSPITALIST

## 2017-08-09 PROCEDURE — 25000242 PHARM REV CODE 250 ALT 637 W/ HCPCS: Performed by: HOSPITALIST

## 2017-08-09 PROCEDURE — 85025 COMPLETE CBC W/AUTO DIFF WBC: CPT

## 2017-08-09 PROCEDURE — 99238 HOSP IP/OBS DSCHRG MGMT 30/<: CPT | Mod: GC,,, | Performed by: HOSPITALIST

## 2017-08-09 PROCEDURE — 25000003 PHARM REV CODE 250: Performed by: STUDENT IN AN ORGANIZED HEALTH CARE EDUCATION/TRAINING PROGRAM

## 2017-08-09 PROCEDURE — 83735 ASSAY OF MAGNESIUM: CPT

## 2017-08-09 RX ORDER — METFORMIN HYDROCHLORIDE 500 MG/1
500 TABLET ORAL 2 TIMES DAILY WITH MEALS
Qty: 180 TABLET | Refills: 0 | Status: ON HOLD | OUTPATIENT
Start: 2017-08-09 | End: 2018-05-23

## 2017-08-09 RX ORDER — SYRINGE,SAFETY WITH NEEDLE,1ML 25GX1"
SYRINGE (EA) MISCELLANEOUS
Qty: 100 EACH | Refills: 1 | Status: SHIPPED | OUTPATIENT
Start: 2017-08-09

## 2017-08-09 RX ORDER — CARVEDILOL 3.12 MG/1
3.12 TABLET ORAL 2 TIMES DAILY
Start: 2017-08-09 | End: 2018-05-04 | Stop reason: DRUGHIGH

## 2017-08-09 RX ORDER — WARFARIN SODIUM 5 MG/1
5 TABLET ORAL ONCE
Status: DISCONTINUED | OUTPATIENT
Start: 2017-08-09 | End: 2017-08-09 | Stop reason: HOSPADM

## 2017-08-09 RX ORDER — WARFARIN SODIUM 5 MG/1
5 TABLET ORAL ONCE
Status: DISCONTINUED | OUTPATIENT
Start: 2017-08-09 | End: 2017-08-09

## 2017-08-09 RX ORDER — FUROSEMIDE 80 MG/1
80 TABLET ORAL 2 TIMES DAILY
Qty: 180 TABLET | Refills: 3 | Status: SHIPPED | OUTPATIENT
Start: 2017-08-09 | End: 2018-05-04 | Stop reason: DRUGHIGH

## 2017-08-09 RX ORDER — WARFARIN SODIUM 5 MG/1
5 TABLET ORAL DAILY
Qty: 30 TABLET | Refills: 11 | Status: SHIPPED | OUTPATIENT
Start: 2017-08-09 | End: 2017-12-05 | Stop reason: ALTCHOICE

## 2017-08-09 RX ORDER — DIGOXIN 125 MCG
0.12 TABLET ORAL DAILY
Qty: 30 TABLET | Refills: 2 | Status: ON HOLD | OUTPATIENT
Start: 2017-08-09 | End: 2019-02-21 | Stop reason: HOSPADM

## 2017-08-09 RX ORDER — GABAPENTIN 600 MG/1
600 TABLET ORAL 3 TIMES DAILY
Qty: 90 TABLET | Refills: 2 | Status: SHIPPED | OUTPATIENT
Start: 2017-08-09 | End: 2018-08-09

## 2017-08-09 RX ORDER — RAMELTEON 8 MG/1
8 TABLET ORAL NIGHTLY
Qty: 30 TABLET | Refills: 2 | Status: SHIPPED | OUTPATIENT
Start: 2017-08-09 | End: 2017-12-05 | Stop reason: ALTCHOICE

## 2017-08-09 RX ADMIN — TIOTROPIUM BROMIDE 18 MCG: 18 CAPSULE ORAL; RESPIRATORY (INHALATION) at 08:08

## 2017-08-09 RX ADMIN — INSULIN DETEMIR 15 UNITS: 100 INJECTION, SOLUTION SUBCUTANEOUS at 09:08

## 2017-08-09 RX ADMIN — FLUTICASONE PROPIONATE 2 SPRAY: 50 SPRAY, METERED NASAL at 08:08

## 2017-08-09 RX ADMIN — MAGNESIUM OXIDE TAB 400 MG (241.3 MG ELEMENTAL MG) 400 MG: 400 (241.3 MG) TAB at 08:08

## 2017-08-09 RX ADMIN — PANTOPRAZOLE SODIUM 40 MG: 40 TABLET, DELAYED RELEASE ORAL at 08:08

## 2017-08-09 RX ADMIN — TAMSULOSIN HYDROCHLORIDE 0.4 MG: 0.4 CAPSULE ORAL at 08:08

## 2017-08-09 RX ADMIN — POLYETHYLENE GLYCOL 3350 17 G: 17 POWDER, FOR SOLUTION ORAL at 08:08

## 2017-08-09 RX ADMIN — OXYCODONE HYDROCHLORIDE 10 MG: 5 TABLET ORAL at 02:08

## 2017-08-09 RX ADMIN — OXYCODONE HYDROCHLORIDE 10 MG: 5 TABLET ORAL at 08:08

## 2017-08-09 RX ADMIN — ALPRAZOLAM 1 MG: 1 TABLET ORAL at 08:08

## 2017-08-09 RX ADMIN — GABAPENTIN 600 MG: 300 CAPSULE ORAL at 05:08

## 2017-08-09 RX ADMIN — ISOSORBIDE MONONITRATE 30 MG: 30 TABLET ORAL at 08:08

## 2017-08-09 RX ADMIN — ASPIRIN 81 MG: 81 TABLET, COATED ORAL at 08:08

## 2017-08-09 RX ADMIN — ATORVASTATIN CALCIUM 80 MG: 20 TABLET, FILM COATED ORAL at 08:08

## 2017-08-09 RX ADMIN — GABAPENTIN 600 MG: 300 CAPSULE ORAL at 03:08

## 2017-08-09 RX ADMIN — DIGOXIN 0.12 MG: 125 TABLET ORAL at 08:08

## 2017-08-09 RX ADMIN — FUROSEMIDE 80 MG: 40 TABLET ORAL at 08:08

## 2017-08-09 RX ADMIN — FLUTICASONE FUROATE AND VILANTEROL TRIFENATATE 1 PUFF: 100; 25 POWDER RESPIRATORY (INHALATION) at 08:08

## 2017-08-09 NOTE — PLAN OF CARE
Problem: Fall Risk (Adult)  Goal: Absence of Falls  Patient will demonstrate the desired outcomes by discharge/transition of care.   Outcome: Unable to achieve outcome(s) by discharge  Fall precautions in place patient reports no falls this shift.Nurses call bell within reach side rails up x3.

## 2017-08-09 NOTE — PROGRESS NOTES
[facility] Hospital Medicine  Progress Note    Patient Name: Shaan Brown  MRN: [id number]  Patient Class: IP- Inpatient   Admission Date: 7/27/2017  Length of Stay: 13 days  Attending Physician: Clarence Euceda MD  Primary Care Provider: Mendel Ferguson Iii, MD    [facility] Medicine Team: [facility] HOSP MED 1 Riley Castellano MD    Subjective:     Principal Problem:Acute on chronic respiratory failure with hypoxia and hypercapnia    HPI:  Shaan Brown is a 62-year-old male w/ chronic mixed hypercapnia and hypoxemia 2/2 COPD, HFpEF, HATTIE and obesity hypoventilation who's on 4-5 liters of continuous supplemental O2 (SpO2 typically in the low 90s-80s)  w/ Trilogy at night. He presented to [facility] on 7/13 via EMS for worsening hypoxia and altered mental status. Initially admitted to the ICU. Worsening hypoxia was thought to be 2/2 to pulmonary HTN w/ PA pressures of 71. Bubble study was negative for shunt. Pt was also started on therapeutic Lovenox and was being bridged to warfarin for suspected PE, despite negative CTA. Finished abx treatment for RLL PNA. Diuretics were held 2/2 to hypotension. Pt has what looks to be a NSTEMI 2/2 to demand, however patient was not tolerate of beta-blocker therapy 2/2 to hypoxia. No improvement in hypoxia despite steroids. Transferred to [facility] for evaluation by pulmonology for pulmonary HTN.   On arrive, pt was hypoxic on 5 liters of supplemental O2. He was complaining of lower back pain.     Hospital Course:  Transferred from [facility] to [facility]'s IM-1 service for evaluation of worsening pulmonary HTN. He was continued on scheduled duonebs, budesonide full dose lovenox. Diuresis with lasix 40 IV BID was started. Pulmonology was consulted and recommended continued diuresis and aerosol treatments, heart transplant consult for PHtn, and stopping full dose AC. Heart transplant was consulted and recommended repeating the ECHO  following aggressive diuresis. Pt c/o L chest wall pain that he reports began after he fell onto an O2 container. On review of CTA from 7/24, pt has multiple fractured ribs on L side; PRN pain medication ordered. He also reported episodes of chest pain during this admission which were consistent with previous episodes of angina; workup not concerning for infarction. Lasix IVP switched to lasix gtt to improve diuresis. ECHO 7/31 showed normal EF and elevated estimated PASP, but was unable to accurately determine PA pressure due to inadequate TR jet. Continued on lasix gtt; rate increased from 10 cc/hr to 20cc/hr for more adequate diuresis. RHC done on 8/4 showed moderately reduced CO/CI, mild to moderate elevated in right and mild elevation in left sided filling pressure and severe pulmonary hypertension. Deescalated lasix from drip to IV, then to PO after adequate diuresis. PFTs ordered - pt will need to be weaned to 2 L O2 before they can be performed. Spoke with his outpatient cardiologist and simplified his home medication list.     Interval History: NAEON. Pt did not get PFTs yesterday- per pulm lab, he will need to be weaned to 2 L O2 before PFTs can be performed. Pt with no complaints this AM, ready to go home.     Review of Systems  Objective:     Vital Signs (Most Recent):  Temp: 98.7 °F (37.1 °C) (08/09/17 0712)  Pulse: 76 (08/09/17 0818)  Resp: 20 (08/09/17 0815)  BP: 110/69 (08/09/17 0712)  SpO2: (!) 86 % (08/09/17 0712) Vital Signs (24h Range):  Temp:  [97.9 °F (36.6 °C)-98.7 °F (37.1 °C)] 98.7 °F (37.1 °C)  Pulse:  [60-87] 76  Resp:  [18-22] 20  SpO2:  [83 %-86 %] 86 %  BP: ()/(51-69) 110/69     Weight: 120 kg (264 lb 8 oz)  Body mass index is 36.89 kg/m².    Intake/Output Summary (Last 24 hours) at 08/09/17 1128  Last data filed at 08/09/17 0910   Gross per 24 hour   Intake              600 ml   Output             1150 ml   Net             -550 ml      Physical Exam   Constitutional: He is  oriented to person, place, and time. He appears well-developed and well-nourished. No distress.   Eyes: EOM are normal.   Neck: Neck supple.   Cardiovascular: Normal rate, regular rhythm, normal heart sounds and intact distal pulses.    Pulmonary/Chest: Effort normal. No respiratory distress. He has no wheezes.   Decreased BS bilateral bases; no significant pain to palpation of fractured ribs from when patient fell   Abdominal: Soft. Bowel sounds are normal. He exhibits distension. There is no tenderness.   Musculoskeletal: He exhibits edema (1+ LE edema bilaterally).   Edema improved from admit   Neurological: He is alert and oriented to person, place, and time. No cranial nerve deficit.   Skin: Skin is warm and dry. Capillary refill takes less than 2 seconds.       Significant Labs:   CBC:   Recent Labs  Lab 08/09/17  0430   WBC 7.86   HGB 13.7*   HCT 42.1        CMP:   Recent Labs  Lab 08/08/17  0418 08/09/17  0430    141   K 3.2* 3.6    102   CO2 31* 26   GLU 86 93   BUN 18 18   CREATININE 1.1 1.0   CALCIUM 9.0 8.8   PROT 6.3 5.9*   ALBUMIN 3.1* 2.9*   BILITOT 1.9* 1.7*   ALKPHOS 90 87   AST 27 25   ALT 34 29   ANIONGAP 11 13   EGFRNONAA >60.0 >60.0     Coagulation:   Recent Labs  Lab 08/09/17  0430   INR 2.9*     POCT Glucose:   Recent Labs  Lab 08/08/17  1643 08/08/17  2144 08/09/17  0711   POCTGLUCOSE 90 115* 103     All pertinent labs within the past 24 hours have been reviewed.    Significant Imaging: I have reviewed all pertinent imaging results/findings within the past 24 hours.    Assessment/Plan:      * Acute on chronic respiratory failure with hypoxia and hypercapnia    - multifactorial with pulm HTN, RH failure, COPD, HATTIE, OHS  - Hypoxic at baseline w/ chronic supplemental O2 requirement (4-5 liters) and Trilogy at night, transferred for pulm-HTN eval.   - Continued guaifenesin, DuoNeb q6 and Budesonide q12.  - repeat ECHO showed normal EF and elevated estimated PASP, but was unable  to accurately determine PA pressure due to inadequate TR jet.  - pulmonology and HTS consulted, appreciate recs  - RHC 8/4 showed moderately reduced CO/CI on 5L NC O2 with no inotrope, mild to moderate elevated in right and mild elevation in left sided filling pressure, and severe pulmonary hypertension  - lasix transitioned to PO  - PFTs ordered        Coronary artery disease involving native coronary artery of native heart without angina pectoris    - Troponemia prior to transfer thought to be 2/2 to demand ischemia  - cont ASA and atorvastatin.   - pt reports bare metal stent placement in 5/2016. No longer needs plavix  - has had chest pain during this admission c/w previous angina but may be 2/2 rib fractures; SL nitroglycerin PRN  - stopped ranolazine given dizziness with standing        Chronic obstructive pulmonary disease with acute exacerbation    - Spiriva  - breo inhaler  - Duo-nebs prn  - PFTs ordered to assess for severity of COPD        Paroxysmal atrial fibrillation    - CHADS VASC 3  - Continue digoxin, amiodarone stopped as pt is in NSR   - restarted warfarin following RHC on 8/4; now on 5 daily  - pt cannot afford a NOAC/ DOAC at this time as he is in the insurance donut hole. Plan to continue warfarin until coverage resumes, then have pt contact PCP about starting NOAC         Type 2 diabetes mellitus with hyperglycemia    - Detemir to 15 units BID w/ SSI - adequate control  - A1C has overall decreased from 13 -> to 8 over 1 mo time span   - 1200 demetria diabetic diet        Peripheral vascular disease    - continuing ASA and atorvastatin         HATTIE on CPAP    BiPAP QHS.        Morbid obesity with alveolar hypoventilation    BiPAP QHS.         Chronic hepatitis C    Patient seen by hepatology at [facility] and in discussion regarding treatment.  Appears that his lung disease is the ultimately limiting factor for treatment (his concerns regarding current treatments and effects on pulmonary status, as well as  high resistance to of hepC strain to currently available regimens).  Patient scheduled to f/u with Hepatology August 2017 with Dr. Lopez.         Rib fractures    - pt with multiple L rib fractures which can be seen on CTA 7/24; pt reports falling on O2 tank  - stopped IV dilaudid (please avoid)  - oxycodone PRN        Pulmonary HTN    - as above  - RHC on 8/4 showed severe PH        Hypokalemia    - replace with PO K+ supplements given lasix diuresis, goal K+ of 4        Obesity due to excess calories    - 1200 demetria diabetic diet          VTE Risk Mitigation         Ordered     warfarin (COUMADIN) tablet 5 mg  Once     Route:  Oral        08/09/17 1216              Riley Castellano MD  Department of Hospital Medicine   [facility]

## 2017-08-09 NOTE — SUBJECTIVE & OBJECTIVE
Interval History: NAEON. Pt did not get PFTs yesterday- per pulm lab, he will need to be weaned to 2 L O2 before PFTs can be performed. Pt with no complaints this AM, ready to go home.     Review of Systems  Objective:     Vital Signs (Most Recent):  Temp: 98.7 °F (37.1 °C) (08/09/17 0712)  Pulse: 76 (08/09/17 0818)  Resp: 20 (08/09/17 0815)  BP: 110/69 (08/09/17 0712)  SpO2: (!) 86 % (08/09/17 0712) Vital Signs (24h Range):  Temp:  [97.9 °F (36.6 °C)-98.7 °F (37.1 °C)] 98.7 °F (37.1 °C)  Pulse:  [60-87] 76  Resp:  [18-22] 20  SpO2:  [83 %-86 %] 86 %  BP: ()/(51-69) 110/69     Weight: 120 kg (264 lb 8 oz)  Body mass index is 36.89 kg/m².    Intake/Output Summary (Last 24 hours) at 08/09/17 1128  Last data filed at 08/09/17 0910   Gross per 24 hour   Intake              600 ml   Output             1150 ml   Net             -550 ml      Physical Exam   Constitutional: He is oriented to person, place, and time. He appears well-developed and well-nourished. No distress.   Eyes: EOM are normal.   Neck: Neck supple.   Cardiovascular: Normal rate, regular rhythm, normal heart sounds and intact distal pulses.    Pulmonary/Chest: Effort normal. No respiratory distress. He has no wheezes.   Decreased BS bilateral bases; no significant pain to palpation of fractured ribs from when patient fell   Abdominal: Soft. Bowel sounds are normal. He exhibits distension. There is no tenderness.   Musculoskeletal: He exhibits edema (1+ LE edema bilaterally).   Edema improved from admit   Neurological: He is alert and oriented to person, place, and time. No cranial nerve deficit.   Skin: Skin is warm and dry. Capillary refill takes less than 2 seconds.       Significant Labs:   CBC:   Recent Labs  Lab 08/09/17  0430   WBC 7.86   HGB 13.7*   HCT 42.1        CMP:   Recent Labs  Lab 08/08/17  0418 08/09/17  0430    141   K 3.2* 3.6    102   CO2 31* 26   GLU 86 93   BUN 18 18   CREATININE 1.1 1.0   CALCIUM 9.0 8.8    PROT 6.3 5.9*   ALBUMIN 3.1* 2.9*   BILITOT 1.9* 1.7*   ALKPHOS 90 87   AST 27 25   ALT 34 29   ANIONGAP 11 13   EGFRNONAA >60.0 >60.0     Coagulation:   Recent Labs  Lab 08/09/17  0430   INR 2.9*     POCT Glucose:   Recent Labs  Lab 08/08/17  1643 08/08/17  2144 08/09/17  0711   POCTGLUCOSE 90 115* 103     All pertinent labs within the past 24 hours have been reviewed.    Significant Imaging: I have reviewed all pertinent imaging results/findings within the past 24 hours.

## 2017-08-09 NOTE — PLAN OF CARE
Problem: Patient Care Overview  Goal: Plan of Care Review  Outcome: Ongoing (interventions implemented as appropriate)  Pt AAOx4. VSS at this time. No acute events overnight. Pain medication administered twice this shift. Remained free of falls. Safety precautions maintained. POC reviewed with pt and spouse who verbalized understanding.

## 2017-08-09 NOTE — PLAN OF CARE
Orders sent to [facility] in [place] for resumption of care.  Fax sent through [facility].      Nano Cali LMSW

## 2017-08-09 NOTE — ASSESSMENT & PLAN NOTE
- Troponemia prior to transfer thought to be 2/2 to demand ischemia  - cont ASA and atorvastatin.   - pt reports bare metal stent placement in 5/2016. No longer needs plavix  - has had chest pain during this admission c/w previous angina but may be 2/2 rib fractures; SL nitroglycerin PRN  - stopped ranolazine given dizziness with standing

## 2017-08-09 NOTE — ASSESSMENT & PLAN NOTE
- CHADS VASC 3  - Continue digoxin, amiodarone stopped as pt is in NSR   - restarted warfarin following RHC on 8/4; now on 5 daily  - pt cannot afford a NOAC/ DOAC at this time as he is in the insurance donut hole. Plan to continue warfarin until coverage resumes, then have pt contact PCP about starting NOAC

## 2017-08-09 NOTE — PT/OT/SLP PROGRESS
Physical Therapy      Shaan Brown  MRN: [id number]    Patient not seen today secondary to pt being discharged. Discharge summary to follow.   Dayna Farris, GLYNN

## 2017-08-09 NOTE — ASSESSMENT & PLAN NOTE
Patient seen by hepatology at [facility] and in discussion regarding treatment.  Appears that his lung disease is the ultimately limiting factor for treatment (his concerns regarding current treatments and effects on pulmonary status, as well as high resistance to of hepC strain to currently available regimens).  Patient scheduled to f/u with Hepatology August 2017 with Dr. Lopez.

## 2017-08-09 NOTE — PLAN OF CARE
"[facility]    HOME HEALTH ORDERS  FACE TO FACE ENCOUNTER    Patient Name: Shaan Brown  YOB: 1955    PCP: Mendel Ferguson Iii, MD   PCP Address: [address] [place] [address]  PCP Phone Number: [phone]  PCP Fax: [phone]    Encounter Date: 08/09/2017    Admit to Home Health    Diagnoses:  Active Hospital Problems    Diagnosis  POA    *Acute on chronic respiratory failure with hypoxia and hypercapnia [J96.21, J96.22]  Yes     Priority: 1 - High    Coronary artery disease involving native coronary artery of native heart without angina pectoris [I25.10]  Yes     Priority: 2     Chronic obstructive pulmonary disease with acute exacerbation [J44.1]  Yes     Priority: 3     Paroxysmal atrial fibrillation [I48.0]  Yes     Priority: 4     Type 2 diabetes mellitus with hyperglycemia [E11.65]  Yes     Priority: 5     Peripheral vascular disease [I73.9]  Yes     Priority: 6     HATTIE on CPAP [G47.33, Z99.89]  Not Applicable     Priority: 7     Morbid obesity with alveolar hypoventilation [E66.2]  Yes     Priority: 8     Chronic hepatitis C [B18.2]  Yes     Priority: 10      Chronic hepatitis C genotype 1A, HCV RNA 9 million IU/mL, treatment naïve, fibrosure test incomplete due to technical problems, Ultrasound did not give the size of the spleen, endoscopy finding did not include finding of varices. Platelet count being normal and INR being normal suggest patient does not appear to have advanced liver disease, and probably not cirrhosis although this is only an assumption. In order to assess fibrosis stage, obtained a complete ultrasound and an elastography.  U/s abd:     U/s elastography: a kPa of 5.63.  The IQR/median was 0.20  indicating an acceptable range.  The measurement corresponds to normal to mild fibrosis, F0-F1.      - On 9/13/2016, tested for NS5A resistance with result of "resistance possible" to 4 out of 5 available NS5A inhibitors (L31L/M SHAUNA to " Daclatasvir, elbasvir, Ledipasvir, Ombitasvir, (not determined against Velpatasvir). Patient has an NS5A drug resistance to all currently available hepatitis C meds, except for Epclusa, which was not determined.    Thus, it will be challenging to treat with currently available drugs. He would have to wait for new drugs to be approved which have high efficacy against NS5A RAVs.      - Multiple comorbidities including:  - Coronary artery disease, status post bypass graft surgery, stent placement   - COPD, dependent on nasal O2 24 hours a day with a resting O2 sat of 80%      I don't believe his lung disease will worsen due to hep C drugs currently in use. But, it is possible his lung disease will limit his life expectancy more than his hep C.      Patient states he does not want to die because of hep C meds. Although I doubt that would happen, I do respect his concern of any side effect resulting from hep C meds.      Pl inform patient:  U/s abd showed enlarged liver and enlarged spleen.  There is some fatty change in the liver, which explains enlarged liver.  Due to enlargement of spleen, cannot rule out singnificant scarring in the liver, thus, we have conflicting reports from elastography and ultrasound.  Do not want to do liver biopsy to sort this out, because of him being on Plavix and aspirin.  Therefore, it might be best to treat hep C when new meds available.  Since his NS5A resistance test had shown possible resistance to all currently available hep C meds, I would like to wait until new drugs are approved, which is expected in August this year.  So please place on clinic schedule in [place] in second half of august 2017..           Rib fractures [S22.39XA]  Yes     Priority: 11     Respiratory failure [J96.90]  Yes    Pulmonary HTN [I27.2]  Yes    Hypokalemia [E87.6]  Yes    Obesity due to excess calories [E66.09]  Yes      Resolved Hospital Problems    Diagnosis Date Resolved POA    Demand ischemia  [I24.8] 08/02/2017 Yes       Future Appointments  Date Time Provider Department Center   9/13/2017 1:30 PM Indra Booker,  [facility]     Follow-up Information     [facility].    Specialty:  Home Health Services  Why:  Home Health   Contact information:  [address] [place] [address]  [phone]             [facility] - Endocrinology.    Specialty:  Endocrinology  Contact information:  [address] [address] [phone]  Additional information:  [facility]                 I have seen and examined this patient face to face today. My clinical findings that support the need for the home health skilled services and home bound status are the following:  Weakness/numbness causing balance and gait disturbance due to Weakness/Debility making it taxing to leave home.  Patient with medication mismanagement issues requiring home bound status as evidenced by  Poor understanding of medication regimen/dosage and Uncontrolled hyperglycemic/hypoglycemic events.  Medical restrictions requiring assistance of another human to leave home due to  Dyspnea on exertion (SOB) and Home oxygen requirement.    Allergies:Review of patient's allergies indicates:  No Known Allergies    Diet: cardiac diet, diabetic diet: 1200 demetria, fluid restriction: 1500 mL and 2 gram sodium diet    Activities: activity as tolerated    Nursing:   SN to complete comprehensive assessment including routine vital signs. Instruct on disease process and s/s of complications to report to MD. Review/verify medication list sent home with the patient at time of discharge  and instruct patient/caregiver as needed. Frequency may be adjusted depending on start of care date.    Notify MD if SBP > 160 or < 90; DBP > 90 or < 50; HR > 120 or < 50; Temp > 101    CONSULTS:    Physical Therapy to evaluate and treat. Evaluate for home safety and equipment needs; Establish/upgrade home exercise program.  Perform / instruct on therapeutic exercises, gait training, transfer training, and Range of Motion.  Occupational Therapy to evaluate and treat. Evaluate home environment for safety and equipment needs. Perform/Instruct on transfers, ADL training, ROM, and therapeutic exercises.   to evaluate for community resources/long-range planning.  Aide to provide assistance with personal care, ADLs, and vital signs.    MISCELLANEOUS CARE:  Diabetic Care:   SN to perform and educate Diabetic management with blood glucose monitoring:, Fingerstick blood sugar AC and HS and Report CBG < 60 or > 350 to physician.  Home Oxygen:  No change  Coumadin Monitoring: Please draw PT/INR labs every Monday and Thursday starting 8/10/17. Fax results to Dr. Ferguson- [phone]    Medications: Review discharge medications with patient and family and provide education.      Current Discharge Medication List      START taking these medications    Details   digoxin (LANOXIN) 125 mcg tablet Take 1 tablet (0.125 mg total) by mouth once daily.  Qty: 30 tablet, Refills: 2      gabapentin (NEURONTIN) 600 MG tablet Take 1 tablet (600 mg total) by mouth 3 (three) times daily.  Qty: 90 tablet, Refills: 2      insulin NPH (NOVOLIN N) 100 unit/mL injection Inject 15 Units into the skin 2 (two) times daily before meals. Relion brand.  Qty: 1 vial, Refills: 1      metformin (GLUCOPHAGE) 500 MG tablet Take 1 tablet (500 mg total) by mouth 2 (two) times daily with meals.  Qty: 180 tablet, Refills: 0      warfarin (COUMADIN) 5 MG tablet Take 1 tablet (5 mg total) by mouth Daily.  Qty: 30 tablet, Refills: 11         CONTINUE these medications which have CHANGED    Details   carvedilol (COREG) 3.125 MG tablet Take 1 tablet (3.125 mg total) by mouth 2 (two) times daily.      furosemide (LASIX) 80 MG tablet Take 1 tablet (80 mg total) by mouth 2 (two) times daily.  Qty: 180 tablet, Refills: 3         CONTINUE these medications which have NOT CHANGED     Details   ADVAIR DISKUS 250-50 mcg/dose diskus inhaler Inhale 1 puff into the lungs 2 (two) times daily.      albuterol (PROAIR HFA) 90 mcg/actuation inhaler Inhale 1 puff into the lungs 2 (two) times daily. Rescue      albuterol (PROVENTIL) 5 mg/mL nebulizer solution Take 2.5 mg by nebulization 3 (three) times daily. Rescue      alprazolam (XANAX) 1 MG tablet Take 1 mg by mouth 2 (two) times daily as needed for Anxiety.       aspirin 81 MG Chew Take 81 mg by mouth once daily.      atorvastatin (LIPITOR) 40 MG tablet Take 2 tablets (80 mg total) by mouth once daily.      cholecalciferol, vitamin D3, (VITAMIN D3) 400 unit Chew Take 400 Units by mouth once daily.      fish oil-omega-3 fatty acids 300-1,000 mg capsule Take 1 capsule by mouth 2 (two) times daily.       hydrocodone-acetaminophen 10-325mg (NORCO)  mg Tab Take 1 tablet by mouth every 6 (six) hours as needed for Pain.       pantoprazole (PROTONIX) 40 MG tablet Take 40 mg by mouth 2 (two) times daily.       potassium chloride SA (K-DUR,KLOR-CON) 10 MEQ tablet Take 10 mEq by mouth once daily.      SPIRIVA RESPIMAT 2.5 mcg/actuation Mist Inhale 2 puffs into the lungs once daily at 6am.      spironolactone (ALDACTONE) 25 MG tablet Take 1 tablet (25 mg total) by mouth once daily.      tamsulosin (FLOMAX) 0.4 mg Cp24 Take 1 capsule (0.4 mg total) by mouth once daily.      sucralfate (CARAFATE) 1 gram tablet Take 1 tablet by mouth 4 (four) times daily before meals and nightly.      tizanidine (ZANAFLEX) 4 MG tablet Take 4 mg by mouth every evening.          STOP taking these medications       amiodarone (PACERONE) 100 MG Tab Comments:   Reason for Stopping:         cilostazol (PLETAL) 100 MG Tab Comments:   Reason for Stopping:         clopidogrel (PLAVIX) 75 mg tablet Comments:   Reason for Stopping:         insulin detemir (LEVEMIR) 100 unit/mL injection Comments:   Reason for Stopping:         isosorbide mononitrate (IMDUR) 30 MG 24 hr tablet Comments:    Reason for Stopping:         isosorbide mononitrate (IMDUR) 60 MG 24 hr tablet Comments:   Reason for Stopping:         morphine (MSIR) 15 MG tablet Comments:   Reason for Stopping:         potassium chloride (MICRO-K) 10 MEQ CpSR Comments:   Reason for Stopping:               I certify that this patient is confined to his home and needs intermittent skilled nursing care, physical therapy and occupational therapy.

## 2017-08-12 NOTE — DISCHARGE SUMMARY
"DISCHARGE SUMMARY  Hospital Medicine    Team: [facility] HOSP MED 1    Patient Name: Shaan Brown  YOB: 1955    Admit Date: 7/27/2017    Discharge Date: 08/09/2017    Discharge Attending Physician: Dr. Clarence Euceda    Diagnoses:  Active Hospital Problems    Diagnosis  POA    *Acute on chronic respiratory failure with hypoxia and hypercapnia [J96.21, J96.22]  Yes     Priority: 1 - High    Coronary artery disease involving native coronary artery of native heart without angina pectoris [I25.10]  Yes     Priority: 2     Chronic obstructive pulmonary disease with acute exacerbation [J44.1]  Yes     Priority: 3     Paroxysmal atrial fibrillation [I48.0]  Yes     Priority: 4     Type 2 diabetes mellitus with hyperglycemia [E11.65]  Yes     Priority: 5     Peripheral vascular disease [I73.9]  Yes     Priority: 6     HATTIE on CPAP [G47.33, Z99.89]  Not Applicable     Priority: 7     Morbid obesity with alveolar hypoventilation [E66.2]  Yes     Priority: 8     Chronic hepatitis C [B18.2]  Yes     Priority: 10      Chronic hepatitis C genotype 1A, HCV RNA 9 million IU/mL, treatment naïve, fibrosure test incomplete due to technical problems, Ultrasound did not give the size of the spleen, endoscopy finding did not include finding of varices. Platelet count being normal and INR being normal suggest patient does not appear to have advanced liver disease, and probably not cirrhosis although this is only an assumption. In order to assess fibrosis stage, obtained a complete ultrasound and an elastography.  U/s abd:     U/s elastography: a kPa of 5.63.  The IQR/median was 0.20  indicating an acceptable range.  The measurement corresponds to normal to mild fibrosis, F0-F1.      - On 9/13/2016, tested for NS5A resistance with result of "resistance possible" to 4 out of 5 available NS5A inhibitors (L31L/M SHAUNA to Daclatasvir, elbasvir, Ledipasvir, Ombitasvir, (not determined against Velpatasvir). Patient has an NS5A " drug resistance to all currently available hepatitis C meds, except for Epclusa, which was not determined.    Thus, it will be challenging to treat with currently available drugs. He would have to wait for new drugs to be approved which have high efficacy against NS5A RAVs.      - Multiple comorbidities including:  - Coronary artery disease, status post bypass graft surgery, stent placement   - COPD, dependent on nasal O2 24 hours a day with a resting O2 sat of 80%      I don't believe his lung disease will worsen due to hep C drugs currently in use. But, it is possible his lung disease will limit his life expectancy more than his hep C.      Patient states he does not want to die because of hep C meds. Although I doubt that would happen, I do respect his concern of any side effect resulting from hep C meds.      Pl inform patient:  U/s abd showed enlarged liver and enlarged spleen.  There is some fatty change in the liver, which explains enlarged liver.  Due to enlargement of spleen, cannot rule out singnificant scarring in the liver, thus, we have conflicting reports from elastography and ultrasound.  Do not want to do liver biopsy to sort this out, because of him being on Plavix and aspirin.  Therefore, it might be best to treat hep C when new meds available.  Since his NS5A resistance test had shown possible resistance to all currently available hep C meds, I would like to wait until new drugs are approved, which is expected in August this year.  So please place on clinic schedule in [place] in second half of august 2017..           Rib fractures [S22.39XA]  Yes     Priority: 11     Respiratory failure [J96.90]  Yes    Pulmonary HTN [I27.2]  Yes    Obesity due to excess calories [E66.09]  Yes      Resolved Hospital Problems    Diagnosis Date Resolved POA    Hypokalemia [E87.6] 08/09/2017 Yes    Demand ischemia [I24.8] 08/02/2017 Yes       Discharged Condition: admit problems have stabilized    HOSPITAL  COURSE:    Initial Presentation:  Shaan Brown is a 62-year-old male w/ chronic mixed hypercapnia and hypoxemia 2/2 COPD, HFpEF, HATTIE and obesity hypoventilation who's on 4-5 liters of continuous supplemental O2 (SpO2 typically in the low 90s-80s)  w/ Trilogy at night. He presented to [facility] on 7/13 via EMS for worsening hypoxia and altered mental status. Initially admitted to the ICU. Worsening hypoxia was thought to be 2/2 to pulmonary HTN w/ PA pressures of 71. Bubble study was negative for shunt. Pt was also started on therapeutic Lovenox and was being bridged to warfarin for suspected PE, despite negative CTA. Finished abx treatment for RLL PNA. Diuretics were held 2/2 to hypotension. Pt has what looks to be a NSTEMI 2/2 to demand, however patient was not tolerate of beta-blocker therapy 2/2 to hypoxia. No improvement in hypoxia despite steroids. Transferred to [facility] for evaluation by pulmonology for pulmonary HTN.   On arrival, pt was hypoxic on 5 liters of supplemental O2. He was complaining of lower back pain    Course of Principle Problem for Admission:  Transferred from [facility] to [facility]'s IM-1 service for evaluation of worsening pulmonary HTN. He was continued on scheduled duonebs, budesonide full dose lovenox. Diuresis with lasix 40 IV BID was started. Pulmonology was consulted and recommended continued diuresis and aerosol treatments, heart transplant consult for PHtn, and stopping full dose AC. Heart transplant was consulted and recommended repeating the ECHO following aggressive diuresis. Pt c/o L chest wall pain that he reports began after he fell onto an O2 container. On review of CTA from 7/24, pt has multiple fractured ribs on L side; PRN pain medication ordered. He also reported episodes of chest pain during this admission which were consistent with previous episodes of angina; workup not concerning for infarction. Lasix IVP switched to lasix gtt to  improve diuresis. ECHO 7/31 showed normal EF and elevated estimated PASP, but was unable to accurately determine PA pressure due to inadequate TR jet. Continued on lasix gtt; rate increased from 10 cc/hr to 20cc/hr for more adequate diuresis. RHC done on 8/4 showed moderately reduced CO/CI, mild to moderate elevated in right and mild elevation in left sided filling pressure and severe pulmonary hypertension. Deescalated lasix from drip to IV, then to PO after adequate diuresis. PFTs ordered - pt will need to be weaned to 2 L O2 before they can be performed. Pt was unable to be weaned from 5 L so decision was made to attempt as outpatient. Spoke with his outpatient cardiologist, Dr. Pathak, and simplified his home medication list. Discharge medications included below. He had stable O2 requirements and breathing following large volume diuresis (-20.1 L since admission) and was discharged on higher dose of PO lasix with plans to follow up with his regular PCP, cardiologist, and pulmonologist and a referral to endocrinology.    Other Medical Problems Addressed in the Hospital:  Coronary artery disease involving native coronary artery of native heart without angina pectoris     - Troponemia prior to transfer thought to be 2/2 to demand ischemia  - cont ASA and atorvastatin.   - pt reports bare metal stent placement in 5/2016. No longer needs plavix  - has had chest pain during this admission c/w previous angina but may be 2/2 rib fractures; SL nitroglycerin PRN  - stopped ranolazine given dizziness with standing       Chronic obstructive pulmonary disease with acute exacerbation     - Spiriva  - breo inhaler  - Duo-nebs prn  - PFTs ordered to assess for severity of COPD       Paroxysmal atrial fibrillation     - CHADS VASC 3  - Continue digoxin, amiodarone stopped as pt is in NSR   - restarted warfarin following RHC on 8/4; now on 5 daily  - pt cannot afford a NOAC/ DOAC at this time as he is in the insurance donut hole.  Plan to continue warfarin until coverage resumes, then have pt contact PCP about starting NOAC        Type 2 diabetes mellitus with hyperglycemia     - Detemir to 15 units BID w/ SSI - adequate control  - A1C has overall decreased from 13 -> to 8 over 1 mo time span   - 1200 demetria diabetic diet       Peripheral vascular disease     - continuing ASA and atorvastatin        HATTIE on CPAP     BiPAP QHS.       Morbid obesity with alveolar hypoventilation     BiPAP QHS.        Chronic hepatitis C     Patient seen by hepatology at [facility] and in discussion regarding treatment.  Appears that his lung disease is the ultimately limiting factor for treatment (his concerns regarding current treatments and effects on pulmonary status, as well as high resistance to of hepC strain to currently available regimens).  Patient scheduled to f/u with Hepatology August 2017 with Dr. Lopez.        Rib fractures     - pt with multiple L rib fractures which can be seen on CTA 7/24; pt reports falling on O2 tank  - stopped IV dilaudid (please avoid)  - oxycodone PRN       Pulmonary HTN     - as above  - RHC on 8/4 showed severe PH       Hypokalemia     - replace with PO K+ supplements given lasix diuresis, goal K+ of 4       Obesity due to excess calories     - 1200 demetria diabetic diet       CONSULTS: Pulmonology, Heart Transplant Service    Other Pertinent Lab Findings:    INR 8/9: 2.9  Hgb A1C 8/3: 8.8    Pertinent/Significant Diagnostic Studies:    2D ECHO 7/31: CONCLUSIONS     1 - Normal left ventricular systolic function (EF 55-60%).     2 - Indeterminate LV diastolic function.     3 - Normal right ventricular systolic function .     4 - The estimated PA systolic pressure is greater than 36 mmHg. Inadeqaute TR jet to assess PA presssure.     5 - Mild left atrial enlargement.     6 - Right ventricular enlargement with normal systolic function.     Special Treatments/Procedures:   Right Heart Cath: Summary/Post-Operative Diagnosis    Moderately  reduced CO/CI on 5L NC O2, no inotrope.    Mild to moderate elevated in right and mild elevation in left sided filling pressure.    Severe pulmonary hypertension.    TPG 35  PVR 8.75.    Disposition: Home with Home Health     Future Scheduled Appointments:  Future Appointments  Date Time Provider Department Center   9/13/2017 1:30 PM Indra Booker DO [facility] PULM [facility]       Follow-up Plans from This Hospitalization:  Follow up with regular PCP, cardiologist, and pulmonologist. Referral placed to endocrinology.      Last CBC/BMP/HgbA1c (if applicable):  Recent Results (from the past 336 hour(s))   CBC auto differential    Collection Time: 08/09/17  4:30 AM   Result Value Ref Range    WBC 7.86 3.90 - 12.70 K/uL    Hemoglobin 13.7 (L) 14.0 - 18.0 g/dL    Hematocrit 42.1 40.0 - 54.0 %    Platelets 198 150 - 350 K/uL   CBC auto differential    Collection Time: 08/07/17  4:07 AM   Result Value Ref Range    WBC 7.64 3.90 - 12.70 K/uL    Hemoglobin 14.4 14.0 - 18.0 g/dL    Hematocrit 43.7 40.0 - 54.0 %    Platelets 187 150 - 350 K/uL   CBC auto differential    Collection Time: 08/04/17  4:26 AM   Result Value Ref Range    WBC 7.08 3.90 - 12.70 K/uL    Hemoglobin 15.0 14.0 - 18.0 g/dL    Hematocrit 45.0 40.0 - 54.0 %    Platelets 178 150 - 350 K/uL     No results found for this or any previous visit (from the past 336 hour(s)).  Lab Results   Component Value Date    HGBA1C 8.8 (H) 08/03/2017       Discharge Medication List:     Medication List      START taking these medications    digoxin 125 mcg tablet  Commonly known as:  LANOXIN  Take 1 tablet (0.125 mg total) by mouth once daily.     gabapentin 600 MG tablet  Commonly known as:  NEURONTIN  Take 1 tablet (600 mg total) by mouth 3 (three) times daily.     insulin  unit/mL injection  Commonly known as:  NovoLIN N  Inject 15 Units into the skin 2 (two) times daily before meals. Relion brand.     insulin syringe-needle U-100 1 mL 31 gauge x 5/16 Syrg  Use to  inject insulin twice daily prior to meals.     metformin 500 MG tablet  Commonly known as:  GLUCOPHAGE  Take 1 tablet (500 mg total) by mouth 2 (two) times daily with meals.     ramelteon 8 mg tablet  Commonly known as:  ROZEREM  Take 1 tablet (8 mg total) by mouth every evening.     warfarin 5 MG tablet  Commonly known as:  COUMADIN  Take 1 tablet (5 mg total) by mouth Daily.        CHANGE how you take these medications    atorvastatin 40 MG tablet  Commonly known as:  LIPITOR  Take 2 tablets (80 mg total) by mouth once daily.  What changed:  · how much to take  · when to take this     carvedilol 3.125 MG tablet  Commonly known as:  COREG  Take 1 tablet (3.125 mg total) by mouth 2 (two) times daily.  What changed:  when to take this     furosemide 80 MG tablet  Commonly known as:  LASIX  Take 1 tablet (80 mg total) by mouth 2 (two) times daily.  What changed:  · medication strength  · how much to take     potassium chloride SA 10 MEQ tablet  Commonly known as:  K-DURKLOR-CON  What changed:  Another medication with the same name was removed. Continue taking this medication, and follow the directions you see here.        CONTINUE taking these medications    ADVAIR DISKUS 250-50 mcg/dose diskus inhaler  Generic drug:  fluticasone-salmeterol 250-50 mcg/dose     * albuterol 5 mg/mL nebulizer solution  Commonly known as:  PROVENTIL     * PROAIR HFA 90 mcg/actuation inhaler  Generic drug:  albuterol     alprazolam 1 MG tablet  Commonly known as:  XANAX     aspirin 81 MG Chew     fish oil-omega-3 fatty acids 300-1,000 mg capsule     hydrocodone-acetaminophen 10-325mg  mg Tab  Commonly known as:  NORCO     pantoprazole 40 MG tablet  Commonly known as:  PROTONIX     SPIRIVA RESPIMAT 2.5 mcg/actuation Mist  Generic drug:  tiotropium bromide     spironolactone 25 MG tablet  Commonly known as:  ALDACTONE  Take 1 tablet (25 mg total) by mouth once daily.     sucralfate 1 gram tablet  Commonly known as:  CARAFATE      tamsulosin 0.4 mg Cp24  Commonly known as:  FLOMAX  Take 1 capsule (0.4 mg total) by mouth once daily.     VITAMIN D3 400 unit Chew  Generic drug:  cholecalciferol (vitamin D3)        * This list has 2 medication(s) that are the same as other medications prescribed for you. Read the directions carefully, and ask your doctor or other care provider to review them with you.            STOP taking these medications    amiodarone 100 MG Tab  Commonly known as:  PACERONE     cilostazol 100 MG Tab  Commonly known as:  PLETAL     clopidogrel 75 mg tablet  Commonly known as:  PLAVIX     insulin detemir 100 unit/mL injection  Commonly known as:  LEVEMIR     isosorbide mononitrate 30 MG 24 hr tablet  Commonly known as:  IMDUR     isosorbide mononitrate 60 MG 24 hr tablet  Commonly known as:  IMDUR     morphine 15 MG tablet  Commonly known as:  MSIR           Where to Get Your Medications      These medications were sent to [facility] [address] [place] [id number]    Phone:  [phone]   · digoxin 125 mcg tablet  · furosemide 80 MG tablet  · gabapentin 600 MG tablet  · insulin  unit/mL injection  · insulin syringe-needle U-100 1 mL 31 gauge x 5/16 Syrg  · metformin 500 MG tablet  · ramelteon 8 mg tablet  · warfarin 5 MG tablet     Information about where to get these medications is not yet available    Ask your nurse or doctor about these medications  · carvedilol 3.125 MG tablet         Patient Instructions:  No discharge procedures on file.    Signing Physician:  Riley Castellano MD

## 2017-12-05 PROBLEM — J18.9 PNEUMONIA OF RIGHT LOWER LOBE DUE TO INFECTIOUS ORGANISM: Status: RESOLVED | Noted: 2017-07-13 | Resolved: 2017-12-05

## 2018-05-23 PROBLEM — I20.9 ANGINA, CLASS II: Status: ACTIVE | Noted: 2018-05-23

## 2018-09-18 PROBLEM — Z99.11 DEPENDENCE ON NON-INVASIVE VENTILATION: Status: ACTIVE | Noted: 2017-05-08

## 2018-12-14 ENCOUNTER — HOSPITAL ENCOUNTER (OUTPATIENT)
Facility: HOSPITAL | Age: 63
Discharge: HOME | End: 2018-12-16
Attending: FAMILY MEDICINE | Admitting: FAMILY MEDICINE
Payer: MEDICARE

## 2018-12-14 DIAGNOSIS — R55 SYNCOPE: ICD-10-CM

## 2018-12-14 DIAGNOSIS — I63.9 STROKE: ICD-10-CM

## 2018-12-14 DIAGNOSIS — E66.2 MORBID OBESITY WITH ALVEOLAR HYPOVENTILATION: ICD-10-CM

## 2018-12-14 DIAGNOSIS — G45.9 TIA (TRANSIENT ISCHEMIC ATTACK): ICD-10-CM

## 2018-12-14 DIAGNOSIS — I25.10 CORONARY ARTERY DISEASE INVOLVING NATIVE CORONARY ARTERY OF NATIVE HEART WITHOUT ANGINA PECTORIS: Primary | ICD-10-CM

## 2018-12-14 DIAGNOSIS — J44.9 COPD, SEVERE: ICD-10-CM

## 2018-12-14 DIAGNOSIS — I48.0 PAROXYSMAL ATRIAL FIBRILLATION: ICD-10-CM

## 2018-12-14 DIAGNOSIS — E11.65 TYPE 2 DIABETES MELLITUS WITH HYPERGLYCEMIA, UNSPECIFIED WHETHER LONG TERM INSULIN USE: ICD-10-CM

## 2018-12-14 DIAGNOSIS — Z79.01 PROPHYLACTIC USE OF ORAL FACTOR XA INHIBITOR FOR VENOUS THROMBOEMBOLISM (VTE): ICD-10-CM

## 2018-12-14 DIAGNOSIS — R55 SYNCOPE AND COLLAPSE: ICD-10-CM

## 2018-12-14 PROBLEM — R47.1 DYSARTHRIA AND ANARTHRIA: Status: ACTIVE | Noted: 2018-12-14

## 2018-12-14 LAB
ALBUMIN SERPL BCP-MCNC: 3.6 G/DL
ALP SERPL-CCNC: 67 U/L
ALT SERPL W/O P-5'-P-CCNC: 16 U/L
ANION GAP SERPL CALC-SCNC: 9 MMOL/L
AST SERPL-CCNC: 20 U/L
BASOPHILS # BLD AUTO: 0.02 K/UL
BASOPHILS NFR BLD: 0.3 %
BILIRUB SERPL-MCNC: 1.4 MG/DL
BUN SERPL-MCNC: 16 MG/DL
CALCIUM SERPL-MCNC: 9.3 MG/DL
CHLORIDE SERPL-SCNC: 108 MMOL/L
CHOLEST SERPL-MCNC: 102 MG/DL
CHOLEST/HDLC SERPL: 5.1 {RATIO}
CK MB SERPL-MCNC: 3.2 NG/ML
CK MB SERPL-RTO: 4.4 %
CK SERPL-CCNC: 72 U/L
CO2 SERPL-SCNC: 25 MMOL/L
CREAT SERPL-MCNC: 1.2 MG/DL
DIFFERENTIAL METHOD: ABNORMAL
EOSINOPHIL # BLD AUTO: 0.1 K/UL
EOSINOPHIL NFR BLD: 1.1 %
ERYTHROCYTE [DISTWIDTH] IN BLOOD BY AUTOMATED COUNT: 15.8 %
EST. GFR  (AFRICAN AMERICAN): >60 ML/MIN/1.73 M^2
EST. GFR  (NON AFRICAN AMERICAN): >60 ML/MIN/1.73 M^2
ESTIMATED AVG GLUCOSE: 143 MG/DL
GLUCOSE SERPL-MCNC: 102 MG/DL
HBA1C MFR BLD HPLC: 6.6 %
HCT VFR BLD AUTO: 49.9 %
HDLC SERPL-MCNC: 20 MG/DL
HDLC SERPL: 19.6 %
HGB BLD-MCNC: 15.9 G/DL
INR PPP: 2.1
LACTATE SERPL-SCNC: 1.7 MMOL/L
LDLC SERPL CALC-MCNC: 63.4 MG/DL
LYMPHOCYTES # BLD AUTO: 2.1 K/UL
LYMPHOCYTES NFR BLD: 27.2 %
MCH RBC QN AUTO: 29.9 PG
MCHC RBC AUTO-ENTMCNC: 31.9 G/DL
MCV RBC AUTO: 94 FL
MONOCYTES # BLD AUTO: 0.7 K/UL
MONOCYTES NFR BLD: 8.8 %
NEUTROPHILS # BLD AUTO: 4.7 K/UL
NEUTROPHILS NFR BLD: 62.3 %
NONHDLC SERPL-MCNC: 82 MG/DL
PLATELET # BLD AUTO: 120 K/UL
PMV BLD AUTO: 10.6 FL
POTASSIUM SERPL-SCNC: 4.4 MMOL/L
PROT SERPL-MCNC: 7.4 G/DL
PROTHROMBIN TIME: 21.6 SEC
RBC # BLD AUTO: 5.31 M/UL
SODIUM SERPL-SCNC: 142 MMOL/L
TRIGL SERPL-MCNC: 93 MG/DL
TROPONIN I SERPL DL<=0.01 NG/ML-MCNC: 0.22 NG/ML
TSH SERPL DL<=0.005 MIU/L-ACNC: 1.73 UIU/ML
WBC # BLD AUTO: 7.58 K/UL

## 2018-12-14 PROCEDURE — 85025 COMPLETE CBC W/AUTO DIFF WBC: CPT

## 2018-12-14 PROCEDURE — 84484 ASSAY OF TROPONIN QUANT: CPT

## 2018-12-14 PROCEDURE — 82550 ASSAY OF CK (CPK): CPT

## 2018-12-14 PROCEDURE — 86703 HIV-1/HIV-2 1 RESULT ANTBDY: CPT

## 2018-12-14 PROCEDURE — 82553 CREATINE MB FRACTION: CPT

## 2018-12-14 PROCEDURE — 80061 LIPID PANEL: CPT

## 2018-12-14 PROCEDURE — 82746 ASSAY OF FOLIC ACID SERUM: CPT

## 2018-12-14 PROCEDURE — A4216 STERILE WATER/SALINE, 10 ML: HCPCS | Performed by: STUDENT IN AN ORGANIZED HEALTH CARE EDUCATION/TRAINING PROGRAM

## 2018-12-14 PROCEDURE — 36415 COLL VENOUS BLD VENIPUNCTURE: CPT

## 2018-12-14 PROCEDURE — G0379 DIRECT REFER HOSPITAL OBSERV: HCPCS

## 2018-12-14 PROCEDURE — 87086 URINE CULTURE/COLONY COUNT: CPT

## 2018-12-14 PROCEDURE — 85610 PROTHROMBIN TIME: CPT

## 2018-12-14 PROCEDURE — 81000 URINALYSIS NONAUTO W/SCOPE: CPT

## 2018-12-14 PROCEDURE — 80053 COMPREHEN METABOLIC PANEL: CPT

## 2018-12-14 PROCEDURE — 84443 ASSAY THYROID STIM HORMONE: CPT

## 2018-12-14 PROCEDURE — 83605 ASSAY OF LACTIC ACID: CPT

## 2018-12-14 PROCEDURE — 25000003 PHARM REV CODE 250: Performed by: STUDENT IN AN ORGANIZED HEALTH CARE EDUCATION/TRAINING PROGRAM

## 2018-12-14 PROCEDURE — 83036 HEMOGLOBIN GLYCOSYLATED A1C: CPT

## 2018-12-14 PROCEDURE — G0378 HOSPITAL OBSERVATION PER HR: HCPCS

## 2018-12-14 PROCEDURE — 87040 BLOOD CULTURE FOR BACTERIA: CPT

## 2018-12-14 PROCEDURE — 82607 VITAMIN B-12: CPT

## 2018-12-14 RX ORDER — LABETALOL HCL 20 MG/4 ML
10 SYRINGE (ML) INTRAVENOUS
Status: DISCONTINUED | OUTPATIENT
Start: 2018-12-14 | End: 2018-12-16 | Stop reason: HOSPADM

## 2018-12-14 RX ORDER — INSULIN ASPART 100 [IU]/ML
0-5 INJECTION, SOLUTION INTRAVENOUS; SUBCUTANEOUS
Status: DISCONTINUED | OUTPATIENT
Start: 2018-12-15 | End: 2018-12-16 | Stop reason: HOSPADM

## 2018-12-14 RX ORDER — ASPIRIN 81 MG/1
81 TABLET ORAL DAILY
Status: DISCONTINUED | OUTPATIENT
Start: 2018-12-15 | End: 2018-12-16 | Stop reason: HOSPADM

## 2018-12-14 RX ORDER — IPRATROPIUM BROMIDE AND ALBUTEROL SULFATE 2.5; .5 MG/3ML; MG/3ML
3 SOLUTION RESPIRATORY (INHALATION) EVERY 4 HOURS PRN
Status: DISCONTINUED | OUTPATIENT
Start: 2018-12-14 | End: 2018-12-16 | Stop reason: HOSPADM

## 2018-12-14 RX ORDER — GABAPENTIN 600 MG/1
600 TABLET ORAL 3 TIMES DAILY
Status: DISCONTINUED | OUTPATIENT
Start: 2018-12-14 | End: 2018-12-16 | Stop reason: HOSPADM

## 2018-12-14 RX ORDER — HYDROCODONE BITARTRATE AND ACETAMINOPHEN 10; 325 MG/1; MG/1
1 TABLET ORAL EVERY 8 HOURS PRN
Status: DISCONTINUED | OUTPATIENT
Start: 2018-12-14 | End: 2018-12-16 | Stop reason: HOSPADM

## 2018-12-14 RX ORDER — NITROGLYCERIN 0.4 MG/1
0.4 TABLET SUBLINGUAL EVERY 5 MIN PRN
Status: DISCONTINUED | OUTPATIENT
Start: 2018-12-14 | End: 2018-12-16 | Stop reason: HOSPADM

## 2018-12-14 RX ORDER — FLUTICASONE FUROATE AND VILANTEROL 100; 25 UG/1; UG/1
1 POWDER RESPIRATORY (INHALATION) DAILY
Status: DISCONTINUED | OUTPATIENT
Start: 2018-12-15 | End: 2018-12-16 | Stop reason: HOSPADM

## 2018-12-14 RX ORDER — IBUPROFEN 200 MG
24 TABLET ORAL
Status: DISCONTINUED | OUTPATIENT
Start: 2018-12-15 | End: 2018-12-16 | Stop reason: HOSPADM

## 2018-12-14 RX ORDER — SODIUM CHLORIDE 0.9 % (FLUSH) 0.9 %
3 SYRINGE (ML) INJECTION EVERY 8 HOURS
Status: DISCONTINUED | OUTPATIENT
Start: 2018-12-14 | End: 2018-12-16 | Stop reason: HOSPADM

## 2018-12-14 RX ORDER — TIOTROPIUM BROMIDE 18 UG/1
1 CAPSULE ORAL; RESPIRATORY (INHALATION) DAILY
Status: DISCONTINUED | OUTPATIENT
Start: 2018-12-15 | End: 2018-12-16 | Stop reason: HOSPADM

## 2018-12-14 RX ORDER — WARFARIN SODIUM 5 MG/1
5 TABLET ORAL
Status: DISCONTINUED | OUTPATIENT
Start: 2018-12-16 | End: 2018-12-16 | Stop reason: HOSPADM

## 2018-12-14 RX ORDER — SPIRONOLACTONE 25 MG/1
25 TABLET ORAL DAILY
Status: DISCONTINUED | OUTPATIENT
Start: 2018-12-15 | End: 2018-12-16 | Stop reason: HOSPADM

## 2018-12-14 RX ORDER — ATORVASTATIN CALCIUM 40 MG/1
40 TABLET, FILM COATED ORAL DAILY
Status: DISCONTINUED | OUTPATIENT
Start: 2018-12-15 | End: 2018-12-16 | Stop reason: HOSPADM

## 2018-12-14 RX ORDER — ALPRAZOLAM 1 MG/1
1 TABLET ORAL 2 TIMES DAILY PRN
Status: DISCONTINUED | OUTPATIENT
Start: 2018-12-14 | End: 2018-12-16 | Stop reason: HOSPADM

## 2018-12-14 RX ORDER — IBUPROFEN 200 MG
16 TABLET ORAL
Status: DISCONTINUED | OUTPATIENT
Start: 2018-12-15 | End: 2018-12-16 | Stop reason: HOSPADM

## 2018-12-14 RX ORDER — FUROSEMIDE 40 MG/1
80 TABLET ORAL DAILY
Status: DISCONTINUED | OUTPATIENT
Start: 2018-12-14 | End: 2018-12-16 | Stop reason: HOSPADM

## 2018-12-14 RX ORDER — ISOSORBIDE MONONITRATE 30 MG/1
30 TABLET, EXTENDED RELEASE ORAL DAILY
Status: DISCONTINUED | OUTPATIENT
Start: 2018-12-15 | End: 2018-12-16 | Stop reason: HOSPADM

## 2018-12-14 RX ORDER — TAMSULOSIN HYDROCHLORIDE 0.4 MG/1
0.4 CAPSULE ORAL DAILY
Status: DISCONTINUED | OUTPATIENT
Start: 2018-12-15 | End: 2018-12-16 | Stop reason: HOSPADM

## 2018-12-14 RX ORDER — WARFARIN SODIUM 5 MG/1
5 TABLET ORAL
Status: DISCONTINUED | OUTPATIENT
Start: 2018-12-15 | End: 2018-12-16 | Stop reason: HOSPADM

## 2018-12-14 RX ORDER — CARVEDILOL 6.25 MG/1
6.25 TABLET ORAL 2 TIMES DAILY WITH MEALS
Status: DISCONTINUED | OUTPATIENT
Start: 2018-12-14 | End: 2018-12-16 | Stop reason: HOSPADM

## 2018-12-14 RX ORDER — WARFARIN 4 MG/1
4 TABLET ORAL
Status: DISCONTINUED | OUTPATIENT
Start: 2018-12-14 | End: 2018-12-16 | Stop reason: HOSPADM

## 2018-12-14 RX ORDER — PANTOPRAZOLE SODIUM 40 MG/1
40 TABLET, DELAYED RELEASE ORAL 2 TIMES DAILY
Status: DISCONTINUED | OUTPATIENT
Start: 2018-12-15 | End: 2018-12-16 | Stop reason: HOSPADM

## 2018-12-14 RX ORDER — ROPINIROLE 0.5 MG/1
0.5 TABLET, FILM COATED ORAL DAILY
Status: DISCONTINUED | OUTPATIENT
Start: 2018-12-15 | End: 2018-12-16 | Stop reason: HOSPADM

## 2018-12-14 RX ORDER — HEPARIN SODIUM 5000 [USP'U]/ML
7500 INJECTION, SOLUTION INTRAVENOUS; SUBCUTANEOUS EVERY 8 HOURS
Status: DISCONTINUED | OUTPATIENT
Start: 2018-12-14 | End: 2018-12-14

## 2018-12-14 RX ORDER — GLUCAGON 1 MG
1 KIT INJECTION
Status: DISCONTINUED | OUTPATIENT
Start: 2018-12-15 | End: 2018-12-16 | Stop reason: HOSPADM

## 2018-12-14 RX ADMIN — Medication 3 ML: at 10:12

## 2018-12-14 RX ADMIN — GABAPENTIN 600 MG: 600 TABLET, FILM COATED ORAL at 11:12

## 2018-12-14 RX ADMIN — WARFARIN SODIUM 4 MG: 4 TABLET ORAL at 11:12

## 2018-12-14 RX ADMIN — ALPRAZOLAM 1 MG: 1 TABLET ORAL at 11:12

## 2018-12-14 RX ADMIN — FUROSEMIDE 80 MG: 40 TABLET ORAL at 11:12

## 2018-12-14 RX ADMIN — CARVEDILOL 6.25 MG: 6.25 TABLET, FILM COATED ORAL at 11:12

## 2018-12-14 NOTE — PROGRESS NOTES
Outside Transfer Acceptance Note     Patients name: Shaan Brown     Transferring Physician or Mid-Level provider/Clinic giving report: Jesse Ibarra MD (ED Physician [facility] [place])    Accepting Physician for admission to hospital: Karina Sutherland MD     Date of acceptance:  12/14/2018    Allergies: Unknown     Reason for transfer: Needs MRI Brain and Neurology consult     HPI: 63 year old male with CAD/CABG on chronic anticoagulation with Coumadin, COPD, and Type 2 DM who was found outside face down next to the car by his spouse, EMS activated and the patient was found to be blue, oxygen saturations were in the 60s, he was given bag/mask ventilation with good improvement.  Once in the ED, the patient had slurring of speech, CT of Head was unremarkable, and Telestroke was activated.  Neurology recommended further imaging with MRI/MRA.  12 Lead EKG showed chronic lateral lead ST depressions and no acute ischemic changes or arrhythmias.  UTox was still pending when the case was discussed, and ETOH level was negligible.       VS: BP 90/56  P 60  T 97.4F  R 18  O2 Sats 94% RA (1421)    Labs: WBCs 15.5, BNP 2100, BUN/Cr 17/1.5, Troponin I 0.04    Radiographs: As above     To Do List upon arrival: 1. Consult Neurology                                            2. Obtain MRI/MRA Brain and Neck                                            3. 2D ECHO with color flow doppler

## 2018-12-15 LAB
ALBUMIN SERPL BCP-MCNC: 3.5 G/DL
ALP SERPL-CCNC: 57 U/L
ALT SERPL W/O P-5'-P-CCNC: 15 U/L
ANION GAP SERPL CALC-SCNC: 9 MMOL/L
APTT BLDCRRT: 33.6 SEC
AST SERPL-CCNC: 19 U/L
BACTERIA #/AREA URNS HPF: ABNORMAL /HPF
BASOPHILS # BLD AUTO: 0.02 K/UL
BASOPHILS NFR BLD: 0.2 %
BILIRUB SERPL-MCNC: 1.7 MG/DL
BILIRUB UR QL STRIP: NEGATIVE
BNP SERPL-MCNC: 607 PG/ML
BUN SERPL-MCNC: 15 MG/DL
CALCIUM SERPL-MCNC: 9 MG/DL
CHLORIDE SERPL-SCNC: 101 MMOL/L
CLARITY UR: CLEAR
CO2 SERPL-SCNC: 30 MMOL/L
COLOR UR: YELLOW
CREAT SERPL-MCNC: 1.3 MG/DL
DIFFERENTIAL METHOD: ABNORMAL
EOSINOPHIL # BLD AUTO: 0.1 K/UL
EOSINOPHIL NFR BLD: 1.5 %
ERYTHROCYTE [DISTWIDTH] IN BLOOD BY AUTOMATED COUNT: 15.7 %
EST. GFR  (AFRICAN AMERICAN): >60 ML/MIN/1.73 M^2
EST. GFR  (NON AFRICAN AMERICAN): 58 ML/MIN/1.73 M^2
FOLATE SERPL-MCNC: 10.3 NG/ML
GLUCOSE SERPL-MCNC: 104 MG/DL
GLUCOSE UR QL STRIP: NEGATIVE
HCT VFR BLD AUTO: 48 %
HGB BLD-MCNC: 15.3 G/DL
HGB UR QL STRIP: ABNORMAL
HYALINE CASTS #/AREA URNS LPF: 1 /LPF
INR PPP: 2.2
KETONES UR QL STRIP: NEGATIVE
LEUKOCYTE ESTERASE UR QL STRIP: NEGATIVE
LYMPHOCYTES # BLD AUTO: 2.7 K/UL
LYMPHOCYTES NFR BLD: 27.7 %
MAGNESIUM SERPL-MCNC: 1.7 MG/DL
MCH RBC QN AUTO: 30 PG
MCHC RBC AUTO-ENTMCNC: 31.9 G/DL
MCV RBC AUTO: 94 FL
MICROSCOPIC COMMENT: ABNORMAL
MONOCYTES # BLD AUTO: 0.9 K/UL
MONOCYTES NFR BLD: 8.9 %
NEUTROPHILS # BLD AUTO: 5.9 K/UL
NEUTROPHILS NFR BLD: 61.5 %
NITRITE UR QL STRIP: NEGATIVE
PH UR STRIP: 6 [PH] (ref 5–8)
PHOSPHATE SERPL-MCNC: 3.1 MG/DL
PLATELET # BLD AUTO: 120 K/UL
PMV BLD AUTO: 10.7 FL
POCT GLUCOSE: 105 MG/DL (ref 70–110)
POCT GLUCOSE: 116 MG/DL (ref 70–110)
POCT GLUCOSE: 125 MG/DL (ref 70–110)
POCT GLUCOSE: 147 MG/DL (ref 70–110)
POTASSIUM SERPL-SCNC: 4.1 MMOL/L
PROT SERPL-MCNC: 7.1 G/DL
PROT UR QL STRIP: ABNORMAL
PROTHROMBIN TIME: 22.9 SEC
RBC # BLD AUTO: 5.1 M/UL
RBC #/AREA URNS HPF: 30 /HPF (ref 0–4)
SODIUM SERPL-SCNC: 140 MMOL/L
SP GR UR STRIP: >=1.03 (ref 1–1.03)
SQUAMOUS #/AREA URNS HPF: 2 /HPF
TROPONIN I SERPL DL<=0.01 NG/ML-MCNC: 0.11 NG/ML
URN SPEC COLLECT METH UR: ABNORMAL
UROBILINOGEN UR STRIP-ACNC: 1 EU/DL
VIT B12 SERPL-MCNC: 315 PG/ML
WBC # BLD AUTO: 9.59 K/UL
WBC #/AREA URNS HPF: 5 /HPF (ref 0–5)

## 2018-12-15 PROCEDURE — G8988 SELF CARE GOAL STATUS: HCPCS | Mod: CI

## 2018-12-15 PROCEDURE — 99900035 HC TECH TIME PER 15 MIN (STAT)

## 2018-12-15 PROCEDURE — 93010 ELECTROCARDIOGRAM REPORT: CPT | Mod: ,,, | Performed by: INTERNAL MEDICINE

## 2018-12-15 PROCEDURE — 83880 ASSAY OF NATRIURETIC PEPTIDE: CPT

## 2018-12-15 PROCEDURE — G8978 MOBILITY CURRENT STATUS: HCPCS | Mod: CK

## 2018-12-15 PROCEDURE — 36415 COLL VENOUS BLD VENIPUNCTURE: CPT

## 2018-12-15 PROCEDURE — G8987 SELF CARE CURRENT STATUS: HCPCS | Mod: CJ

## 2018-12-15 PROCEDURE — 97161 PT EVAL LOW COMPLEX 20 MIN: CPT

## 2018-12-15 PROCEDURE — G8979 MOBILITY GOAL STATUS: HCPCS | Mod: CI

## 2018-12-15 PROCEDURE — 93010 ELECTROCARDIOGRAM REPORT: CPT | Mod: 76,,, | Performed by: INTERNAL MEDICINE

## 2018-12-15 PROCEDURE — 85610 PROTHROMBIN TIME: CPT

## 2018-12-15 PROCEDURE — A4216 STERILE WATER/SALINE, 10 ML: HCPCS | Performed by: STUDENT IN AN ORGANIZED HEALTH CARE EDUCATION/TRAINING PROGRAM

## 2018-12-15 PROCEDURE — G0378 HOSPITAL OBSERVATION PER HR: HCPCS

## 2018-12-15 PROCEDURE — 85025 COMPLETE CBC W/AUTO DIFF WBC: CPT

## 2018-12-15 PROCEDURE — 84484 ASSAY OF TROPONIN QUANT: CPT

## 2018-12-15 PROCEDURE — 85730 THROMBOPLASTIN TIME PARTIAL: CPT

## 2018-12-15 PROCEDURE — 83735 ASSAY OF MAGNESIUM: CPT

## 2018-12-15 PROCEDURE — 97165 OT EVAL LOW COMPLEX 30 MIN: CPT

## 2018-12-15 PROCEDURE — 27000190 HC CPAP FULL FACE MASK W/VALVE

## 2018-12-15 PROCEDURE — 63600175 PHARM REV CODE 636 W HCPCS: Performed by: STUDENT IN AN ORGANIZED HEALTH CARE EDUCATION/TRAINING PROGRAM

## 2018-12-15 PROCEDURE — 25500020 PHARM REV CODE 255: Performed by: FAMILY MEDICINE

## 2018-12-15 PROCEDURE — A9585 GADOBUTROL INJECTION: HCPCS | Performed by: FAMILY MEDICINE

## 2018-12-15 PROCEDURE — 94761 N-INVAS EAR/PLS OXIMETRY MLT: CPT

## 2018-12-15 PROCEDURE — 93005 ELECTROCARDIOGRAM TRACING: CPT

## 2018-12-15 PROCEDURE — 84100 ASSAY OF PHOSPHORUS: CPT

## 2018-12-15 PROCEDURE — 27000221 HC OXYGEN, UP TO 24 HOURS

## 2018-12-15 PROCEDURE — 25000003 PHARM REV CODE 250: Performed by: STUDENT IN AN ORGANIZED HEALTH CARE EDUCATION/TRAINING PROGRAM

## 2018-12-15 PROCEDURE — 94660 CPAP INITIATION&MGMT: CPT

## 2018-12-15 PROCEDURE — 80053 COMPREHEN METABOLIC PANEL: CPT

## 2018-12-15 RX ORDER — DIGOXIN 125 MCG
0.12 TABLET ORAL DAILY
Status: DISCONTINUED | OUTPATIENT
Start: 2018-12-15 | End: 2018-12-16 | Stop reason: HOSPADM

## 2018-12-15 RX ORDER — GADOBUTROL 604.72 MG/ML
10 INJECTION INTRAVENOUS
Status: COMPLETED | OUTPATIENT
Start: 2018-12-15 | End: 2018-12-15

## 2018-12-15 RX ORDER — AMIODARONE HYDROCHLORIDE 100 MG/1
100 TABLET ORAL EVERY OTHER DAY
Status: DISCONTINUED | OUTPATIENT
Start: 2018-12-15 | End: 2018-12-16 | Stop reason: HOSPADM

## 2018-12-15 RX ORDER — LANOLIN ALCOHOL/MO/W.PET/CERES
1000 CREAM (GRAM) TOPICAL DAILY
Status: DISCONTINUED | OUTPATIENT
Start: 2018-12-16 | End: 2018-12-16 | Stop reason: HOSPADM

## 2018-12-15 RX ORDER — AMIODARONE HYDROCHLORIDE 100 MG/1
50 TABLET ORAL EVERY OTHER DAY
Status: DISCONTINUED | OUTPATIENT
Start: 2018-12-16 | End: 2018-12-15

## 2018-12-15 RX ADMIN — Medication 3 ML: at 02:12

## 2018-12-15 RX ADMIN — ROPINIROLE HYDROCHLORIDE 0.5 MG: 0.5 TABLET, FILM COATED ORAL at 09:12

## 2018-12-15 RX ADMIN — AMIODARONE HYDROCHLORIDE 100 MG: 100 TABLET ORAL at 09:12

## 2018-12-15 RX ADMIN — GABAPENTIN 600 MG: 600 TABLET, FILM COATED ORAL at 02:12

## 2018-12-15 RX ADMIN — FUROSEMIDE 80 MG: 40 TABLET ORAL at 09:12

## 2018-12-15 RX ADMIN — GADOBUTROL 10 ML: 604.72 INJECTION INTRAVENOUS at 12:12

## 2018-12-15 RX ADMIN — PANTOPRAZOLE SODIUM 40 MG: 40 TABLET, DELAYED RELEASE ORAL at 09:12

## 2018-12-15 RX ADMIN — WARFARIN SODIUM 5 MG: 5 TABLET ORAL at 04:12

## 2018-12-15 RX ADMIN — GABAPENTIN 600 MG: 600 TABLET, FILM COATED ORAL at 09:12

## 2018-12-15 RX ADMIN — ISOSORBIDE MONONITRATE 30 MG: 30 TABLET, EXTENDED RELEASE ORAL at 09:12

## 2018-12-15 RX ADMIN — TAMSULOSIN HYDROCHLORIDE 0.4 MG: 0.4 CAPSULE ORAL at 09:12

## 2018-12-15 RX ADMIN — Medication 3 ML: at 06:12

## 2018-12-15 RX ADMIN — ATORVASTATIN CALCIUM 40 MG: 40 TABLET, FILM COATED ORAL at 09:12

## 2018-12-15 RX ADMIN — Medication 3 ML: at 09:12

## 2018-12-15 RX ADMIN — ALPRAZOLAM 1 MG: 1 TABLET ORAL at 09:12

## 2018-12-15 RX ADMIN — HYDROCODONE BITARTRATE AND ACETAMINOPHEN 1 TABLET: 10; 325 TABLET ORAL at 05:12

## 2018-12-15 RX ADMIN — ASPIRIN 81 MG: 81 TABLET, COATED ORAL at 09:12

## 2018-12-15 RX ADMIN — CARVEDILOL 6.25 MG: 6.25 TABLET, FILM COATED ORAL at 04:12

## 2018-12-15 RX ADMIN — INSULIN HUMAN 15 UNITS: 100 INJECTION, SUSPENSION SUBCUTANEOUS at 04:12

## 2018-12-15 RX ADMIN — CARVEDILOL 6.25 MG: 6.25 TABLET, FILM COATED ORAL at 09:12

## 2018-12-15 RX ADMIN — SPIRONOLACTONE 25 MG: 25 TABLET ORAL at 09:12

## 2018-12-15 RX ADMIN — INSULIN HUMAN 15 UNITS: 100 INJECTION, SUSPENSION SUBCUTANEOUS at 09:12

## 2018-12-15 NOTE — PROGRESS NOTES
Progress Note  [facility]  Admit Date: 12/14/2018   LOS: 0 days   SUBJECTIVE:     Follow-up For: recurrent syncope with subsequent slurred speech    NAEON.  Passed bedside swallow.  Pt resumed on home meds including ASA, lipitor, coumadin, digoxin, amiodarone.  Neuro consulted.  MRI/MRA ordered.  No further slurring of speech    Review of Systems   Eyes: Negative for double vision.   +Pleuritic chest pain, prior angina chest pain  No N/V, no D/C, no F/C    OBJECTIVE:   Vital Signs (Most Recent)  Temp: 96.2 °F (35.7 °C) (12/15/18 0042)  Pulse: (!) 53 (12/15/18 0042)  Resp: 16 (12/15/18 0042)  BP: (!) 114/56 (12/15/18 0042)  SpO2: (!) 93 % (12/15/18 0035)    I & O (Last 24H):    Intake/Output Summary (Last 24 hours) at 12/15/2018 0103  Last data filed at 12/14/2018 2349  Gross per 24 hour   Intake --   Output 350 ml   Net -350 ml     Wt Readings from Last 3 Encounters:   12/14/18 127.2 kg (280 lb 6.8 oz)   12/14/18 132 kg (291 lb)   09/18/18 124.6 kg (274 lb 12.8 oz)       Current Diet Order   Procedures    Diet diabetic [facility]; 2000 Calorie; Coumadin Restriction; Thin     cardiac     Order Specific Question:   Indicate patient location for additional diet options:     Answer:   [facility]     Order Specific Question:   Total calories:     Answer:   2000 Calorie     Order Specific Question:   Additional Diet Options:     Answer:   Coumadin Restriction     Order Specific Question:   Fluid consistency:     Answer:   Thin        Physical Exam  General: well developed, NAD, on 5 liters NC  HEENT: normocephalic, atraumatic, hearing grossly normal bilaterally, mucous membranes moist, EOM intact, no scleral icterus  Neck: supple, symmetrical, trachea midline  Lungs:  clear to auscultation bilaterally and mild SOB with speaking,  Cardiovascular: greatly diminished heart sounds, displaced PMI  Extremities: no cyanosis or edema, or clubbing, distal pulses faint  Abdomen: soft, non-tender to palpation,  no distention, no masses/hernias  Skin: Skin color, texture, turgor normal. No rashes, multiple skin tags, moles  Musculoskeletal: no clubbing, cyanosis, no deformities  Neurologic: AAO X 4, No focal numbness or weakness CN II-XII intact, speech clear, finger-to-nose intact, gait deferred)  Psych/Behavioral:  Pleasant, appropriate affect.        Laboratory Data:  CBC  Recent Labs   Lab 12/14/18 2122   WBC 7.58   RBC 5.31   HGB 15.9   HCT 49.9   *   MCV 94   MCH 29.9   MCHC 31.9*     CMP  Recent Labs   Lab 12/14/18 2122   CALCIUM 9.3   PROT 7.4      K 4.4   CO2 25      BUN 16   CREATININE 1.2   ALKPHOS 67   ALT 16   AST 20   BILITOT 1.4*     POCT-Glucose  No results found for: POCTGLUCOSE  COAGS  Recent Labs   Lab 12/14/18 2122   INR 2.1*     UA  Recent Labs   Lab 12/14/18 2357   COLORU Yellow   SPECGRAV >=1.030*   PHUR 6.0   PROTEINUA 1+*   BACTERIA Rare     MICRO  Microbiology Results (last 7 days)     Procedure Component Value Units Date/Time    Blood culture [[id number]] Collected:  12/14/18 2129    Order Status:  Sent Specimen:  Blood Updated:  12/15/18 0021    Blood culture [[id number]] Collected:  12/14/18 2122    Order Status:  Sent Specimen:  Blood Updated:  12/15/18 0020    Urine culture [[id number]] Collected:  12/14/18 2350    Order Status:  Sent Specimen:  Urine, Clean Catch Updated:  12/14/18 2350        LIPID PANEL  Lab Results   Component Value Date    CHOL 102 (L) 12/14/2018     Lab Results   Component Value Date    HDL 20 (L) 12/14/2018     Lab Results   Component Value Date    LDLCALC 63.4 12/14/2018     Lab Results   Component Value Date    TRIG 93 12/14/2018     Lab Results   Component Value Date    CHOLHDL 19.6 (L) 12/14/2018     INR: 2.1  Trop: 0.224, trending  HgBA1C: 6.6      Diagnostic Results:  Imaging in last 24 hours:    CXR 12/14/18: No acute findings, enlarged cardiac silhouette  EKG: chronic ST depression    ASSESSMENT/PLAN:   Shaan Brown is a 63 y.o.  male    Syncope Vs Stroke/TIA  Pt found down by wife with subsequent slurred speech, but no specific seizure/post-ictal findings.  Telestroke consulted and pt receiving work-up for Stroke/TIA.  No current focal deficits.  CT head neg, EKG with chronic changes, nothing acute   Troponin elevated but down trending and EKGs with ST depression similar to past  MRI/MRA ordered along with repeat ECHO  Neurology consulted, will call in AM     CAD/CABG  4 vessel CABG in pt's 40's and with balloon angioplasty in May 2018  On coumadin chronically, INR 2.2  Pt has chronic angina well-known to his cardiologist  Resuming coumadin after bedside swallow study  Also on lipitor 40 as home med, ASA 81- restarted as 2/2 stroke prevention as well  Pt did have chest pain before syncope event and troponin is elevated- will trend, consider cards consult if troponins rise     Acute on Chronic DiastolicCongestive Heart failure  Echo 7/17 with EF of 55% but indeterminate LV diastolic function  Getting repeat echo    COPD  On oxygen 4.5 liters in-patient (home 5.5 liters)  Also ordered duo-neb breathing treatments, tiotroprium, fluticasone-vilanterol     DMT2  HgbA1C 6.6 this admission  Resuming home NPH 15 BID before meals + SS  Accuchecks q AC & HS     GERD  Protonix 40 mg BID (home med)     DVT ppx:  Resumed home coumadin   Code: Did discuss with patient.  He wishes to remain full code for now, with the understanding that he does not want to be on the vent for long term.  He will have his wife make the call if it looks like he wouldn't improve from any initial insult (did discuss poor prognosis at this time given his need for 5 1/2 liters at home)     Dispo:  Pending MRI/MRA and neuro recs.        12/15/2018 Jesse Ryan MD  8:21 AM

## 2018-12-15 NOTE — PT/OT/SLP PROGRESS
Occupational Therapy  Missed Visit    Patient Name:  Shaan Brown   MRN:  [id number]    Patient not seen today secondary to  ROSA MARIA in testing @ 1015 & 11 . Will follow-up.    Castillo Uriarte OT  12/15/2018

## 2018-12-15 NOTE — PLAN OF CARE
Problem: Adult Inpatient Plan of Care  Goal: Plan of Care Review  Pt on documented O2, no respiratory distress noted. Will continue to monitor.

## 2018-12-15 NOTE — H&P
"[facility]  History & Physical    SUBJECTIVE:     Chief Complaint:   Syncope with subsequent slurring of speech    History of Present Illness:    63 year old male with CAD/CABG with multiple BLE stents on chronic anticoagulation with Coumadin, severe COPD on 5.5 liters of home O2, HFpEF (EF 55%) and Type 2 DM (hgbA1C 6.6 - was 13.1 in July 2017) who was found outside face down next to the car by his spouse this afternoon around noon. Pt fell face first and broke eye glasses though no obvious head trauma. Pt had been standing outside of the car arranging his oxygen tanks when he had onset of chest pain and then passed out. Patient was found to be blue by EMS with an O2 in the 60's but he was bag masked to improvement.      Once in the [facility] ED, the patient had slurring of speech.  Though CT of Head was unremarkable, the Telestroke was activated.  Neurology then recommended further imaging with MRI/MRA.  12 Lead EKG showed chronic lateral lead ST depressions and no acute ischemic changes or arrhythmias.  UTox was still pending when the case was discussed, and ETOH level was negligible.         Pt takes up to 6 Norco 10 mg per day for "all over" chronic pain.  Compliant with medications and active at baseline.  Has been evaluated by hospice before but was discharged because pt was mowing his own lawn and up on ladders doing house work.  No sick contacts.    No F/Cm no N/V, no diarrhea though pt does describe some constipation.  Was feeling well and himself earlier in the day and the days prior.    Allergies:  Review of patient's allergies indicates:   Allergen Reactions    Ibuprofen        Home Medications:  No current facility-administered medications on file prior to encounter.      Current Outpatient Medications on File Prior to Encounter   Medication Sig    ADVAIR DISKUS 250-50 mcg/dose diskus inhaler Inhale 1 puff into the lungs 2 (two) times daily.    albuterol (PROAIR HFA) 90 mcg/actuation inhaler " Inhale 1 puff into the lungs 2 (two) times daily. Rescue    albuterol (PROVENTIL) 5 mg/mL nebulizer solution Take 2.5 mg by nebulization every 4 (four) hours as needed. Rescue     albuterol-ipratropium 2.5mg-0.5mg/3mL (DUO-NEB) 0.5 mg-3 mg(2.5 mg base)/3 mL nebulizer solution every 4 (four) hours as needed.     alprazolam (XANAX) 1 MG tablet Take 1 mg by mouth 2 (two) times daily as needed for Anxiety.     aspirin 81 MG Chew Take 81 mg by mouth once daily.    atorvastatin (LIPITOR) 40 MG tablet Take 2 tablets (80 mg total) by mouth once daily.    carvedilol (COREG) 6.25 MG tablet Take 6.25 mg by mouth 2 (two) times daily with meals.    cholecalciferol, vitamin D3, (VITAMIN D3) 400 unit Chew Take 400 Units by mouth once daily.    digoxin (LANOXIN) 125 mcg tablet Take 1 tablet (0.125 mg total) by mouth once daily.    fish oil-omega-3 fatty acids 300-1,000 mg capsule Take 1 capsule by mouth 2 (two) times daily.     furosemide (LASIX) 80 MG tablet Take 80 mg by mouth once daily.    gabapentin (NEURONTIN) 600 MG tablet Take 1 tablet (600 mg total) by mouth 3 (three) times daily.    hydrocodone-acetaminophen 10-325mg (NORCO)  mg Tab Take 1 tablet by mouth every 6 (six) hours as needed for Pain.     insulin NPH (NOVOLIN N) 100 unit/mL injection Inject 15 Units into the skin 2 (two) times daily before meals. Relion brand. (Patient taking differently: Inject 15 Units into the skin before breakfast. Relion brand.)    insulin syringe-needle U-100 1 mL 31 gauge x 5/16 Syrg Use to inject insulin twice daily prior to meals.    isosorbide mononitrate (IMDUR) 30 MG 24 hr tablet Take 1 tablet (30 mg total) by mouth once daily.    metFORMIN (GLUCOPHAGE) 500 MG tablet Take 1 tablet (500 mg total) by mouth daily with breakfast.    nitroGLYCERIN (NITROSTAT) 0.4 MG SL tablet Place 0.4 mg under the tongue every 5 (five) minutes as needed for Chest pain.    pantoprazole (PROTONIX) 40 MG tablet Take 40 mg by mouth 2  (two) times daily.     potassium chloride SA (K-DUR,KLOR-CON) 10 MEQ tablet Take 10 mEq by mouth once daily.    rOPINIRole (REQUIP) 0.5 MG tablet Take 1 tablet by mouth once daily.    SPIRIVA RESPIMAT 2.5 mcg/actuation Mist Inhale 2 puffs into the lungs once daily at 6am.    spironolactone (ALDACTONE) 25 MG tablet Take 1 tablet (25 mg total) by mouth once daily.    tamsulosin (FLOMAX) 0.4 mg Cp24 Take 1 capsule (0.4 mg total) by mouth once daily.    warfarin (COUMADIN) 4 MG tablet Take 4 mg by mouth once daily.    warfarin (COUMADIN) 5 MG tablet Take 1 tablet by mouth every Wednesday.        Past Medical History:   Diagnosis Date    Acid reflux     Angina, class III     Anticoagulant long-term use     Anxiety and depression     Atherosclerosis of lower extremity with claudication     Bilateral carotid artery stenosis     CAD (coronary artery disease)     CAD (coronary artery disease) of artery bypass graft     CHF (congestive heart failure)     Chronic hepatitis C     COPD (chronic obstructive pulmonary disease)     Diabetes mellitus     Hepatitis     HHD (hypertensive heart disease)     Hypercholesteremia     Hypertension     Hypoxia     Neuropathy, lower extremity     Obstructive sleep apnea     PAF (paroxysmal atrial fibrillation)     Presence of stent in artery     Requires continuous at home supplemental oxygen     Syncope     Syncope and collapse      Past Surgical History:   Procedure Laterality Date    choley      COLONOSCOPY      CORONARY ARTERY BYPASS GRAFT      x 4    Left heart cath Left 5/23/2018    Performed by Nicolás Pathak MD at [facility] CATH    LEFT HEART CATHETERIZATION Left 5/23/2018    Procedure: Left heart cath;  Surgeon: Nicolás Pathak MD;  Location: [facility] CATH;  Service: Cardiovascular;  Laterality: Left;    stent in artery      UPPER GASTROINTESTINAL ENDOSCOPY       Family History   Problem Relation Age of Onset    Liver disease Father     Liver  disease Sister      Social History     Tobacco Use    Smoking status: Former Smoker     Packs/day: 4.00     Years: 40.00     Pack years: 160.00     Types: Cigarettes     Start date:      Last attempt to quit: 2009     Years since quittin.5    Smokeless tobacco: Never Used   Substance Use Topics    Alcohol use: No    Drug use: No        Review of Systems:  Constitutional: no fever or chills  Eyes: no visual changes  ENT: no nasal congestion or sore throat  Respiratory: no cough, + shortness of breath  Cardiovascular: + chest pain or palpitations, occasional constipation  Genitourinary: no hematuria or dysuria  Integument/Breast: no rash or pruritis  Hematologic/Lymphatic: no easy bruising or lymphadenopathy  Musculoskeletal: no arthralgias or myalgias  Neurological: no known seizures or tremors  Behavioral/Psych: no auditory or visual hallucinations  Endocrine: no heat or cold intolerance    OBJECTIVE:     Vital Signs:  Temp: 97.5 °F (36.4 °C) (18)  Pulse: (!) 54 (18)  Resp: 16 (18)  BP: 120/68 (18)    Physical Exam:  General: well developed, NAD, on 5 liters NC  HEENT: normocephalic, atraumatic, hearing grossly normal bilaterally, mucous membranes moist, EOM intact, no scleral icterus  Neck: supple, symmetrical, trachea midline  Lungs:  clear to auscultation bilaterally and mild SOB with speaking,  Cardiovascular: greatly diminished heart sounds, displaced PMI  Extremities: no cyanosis or edema, or clubbing, distal pulses faint  Abdomen: soft, non-tender to palpation, no distention, no masses/hernias  Skin: Skin color, texture, turgor normal. No rashes, multiple skin tags, moles  Musculoskeletal: no clubbing, cyanosis, no deformities  Neurologic: AAO X 4, No focal numbness or weakness CN II-XII intact, speech clear, finger-to-nose intact, gait deferred)  Psych/Behavioral:  Pleasant, appropriate affect.    Laboratory:  Labs Reviewed   CBC W/ AUTO  DIFFERENTIAL - Abnormal; Notable for the following components:       Result Value    MCHC 31.9 (*)     RDW 15.8 (*)     Platelets 120 (*)     All other components within normal limits    Narrative:     Fasting   COMPREHENSIVE METABOLIC PANEL - Abnormal; Notable for the following components:    Total Bilirubin 1.4 (*)     All other components within normal limits    Narrative:     Fasting   PROTIME-INR - Abnormal; Notable for the following components:    Prothrombin Time 21.6 (*)     INR 2.1 (*)     All other components within normal limits    Narrative:     Fasting   HEMOGLOBIN A1C - Abnormal; Notable for the following components:    Hemoglobin A1C 6.6 (*)     Estimated Avg Glucose 143 (*)     All other components within normal limits    Narrative:     Fasting   LIPID PANEL - Abnormal; Notable for the following components:    Cholesterol 102 (*)     HDL 20 (*)     HDL/Chol Ratio 19.6 (*)     Total Cholesterol/HDL Ratio 5.1 (*)     All other components within normal limits    Narrative:     Fasting   TROPONIN I - Abnormal; Notable for the following components:    Troponin I 0.224 (*)     All other components within normal limits    Narrative:     Fasting   CULTURE, BLOOD   CULTURE, BLOOD   CULTURE, URINE   LACTIC ACID, PLASMA    Narrative:     Fasting   TSH    Narrative:     Fasting   CK-MB    Narrative:     Fasting   URINALYSIS   VITAMIN B12   FOLATE   HIV 1 / 2 ANTIBODY       Diagnostic Results:  EKG, CXR, MRI/MRA pending    ASSESSMENT:     62 yo male w/ CAD/CABG on coumadin, severe COPD with home O2 of 5.5 liters NC, HFpEF (EF 55%), DMT2 (HgbA1C 6.6) presents with recurrent syncope event after chest pain with associated hypoxia and subsequent slurred speech in the ED. Transferred for stroke work-up with MRI/MRA and neurology consult.  No focal deficits.    PLAN:     Syncope Vs Stroke/TIA  Pt found down by wife with subsequent slurred speech, but no specific seizure/post-ictal findings.  Telestroke consulted and pt  receiving work-up for Stroke/TIA.  No current focal deficits.  CT head neg, EKG with chronic changes, nothing acute   Troponin elevated and trended with EKGs  MRI/MRA ordered along with repeat ECHO  Neurology consulted, will call in AM    CAD/CABG  4 vessel CABG in pt's 40's and with balloon angioplasty in May 2018  On coumadin chronically, INR tonight 2.1  Pt has chronic angina well-known to his cardiologist  Resuming coumadin after bedside swallow study  Also on lipitor 40 as home med, ASA 81- restarted as 2/2 stroke prevention as well  Pt did have chest pain before syncope event and troponin is elevated- will trend, consider cards consult if troponins rise    COPD  On oxygen 4.5 liters in-patient (home 5.5 liters)  Also ordered duo-neb breathing treatments, tiotroprium, fluticasone-vilanterol    DMT2  HgbA1C 6.6 this admission  Resuming home NPH 15 BID before meals + SS  Accuchecks q AC & HS    GERD  Protonix 40 mg BID (home med)    DVT ppx:  Resumed home coumadin   Code: Did discuss with patient.  He wishes to remain full code for now, with the understanding that he does not want to be on the vent for long term.  He will have his wife make the call if it looks like he wouldn't improve from any initial insult (did discuss poor prognosis at this time given his need for 5 1/2 liters at home)    Dispo:  Pending MRI/MRA and neuro recs.    Jesse Ryan- HO2  [facility]- [place]  9:09 PM

## 2018-12-15 NOTE — CONSULTS
"NEUROLOGY FLOOR CONSULT    Reason for consult: TIA in patient with severe COPD and hypoxia    Informant:  Patient       Other sources of information : Chart Review    CC:  "n/a"    HPI:  Mr. Stevens is 64 y/o male R handed with PMHx of CAD/CABG with multiple BLE stents on chronic anticoagulation with Coumadin, severe COPD on 5.5 liters of home O2, HFpEF (EF 55%) and Type 2 DM (hgbA1C 6.6 - was 13.1 in July 2017) who was found outside face down next to the car by his spouse this afternoon around noon. Patient reported chest pain when he was arranging his oxygen tanks outside of the car, he passed out and was found by EMS with O2 60's but his oxygen level improved after bag masked. Patient developed slurring speech and was stroke activated.   CTHead was unremarkable, BP and BG were 120/68 and 162 respectively   Pt takes up to 6 Norco 10 mg per day for "all over" chronic pain.    Patient reported that this is the 5 episode of syncope, 3 episodes before were when he was in hospice.      ROS:   Constitutional: no fever or chills   Eyes: no visual changes, no eye pain, or diplopia   ENT: Nasal congestion   Respiratory: no cough  shorness of breath  Cardiovascular:  palpitations  chest pain reproducible to palpation  Gastrointestinal: No nausea or vomiting, No abdominal pain, no change in bowel habits   Genitourinary: no hematuria or dysuria   Integument/Breast: no rash or pruritis   Hematologic/Lymphatic: no easy bruising or lymphadenopathy   Musculoskeletal: no arthralgias or myalgias   Neurological: no seizures, tremors, dizziness or headaches   RUE numbness  Behavioral/Psych: no auditory or visual hallucinations, denies SI/HI   Endocrine: no heat or cold intolerance.  All other systems: negative for any new symptoms      Histories:     Allergies:  Ibuprofen    Current Medications:    Current Facility-Administered Medications   Medication Dose Route Frequency Provider Last Rate Last Dose    albuterol-ipratropium 2.5 " mg-0.5 mg/3 mL nebulizer solution 3 mL  3 mL Nebulization Q4H PRN Jesse Ryan MD        ALPRAZolam tablet 1 mg  1 mg Oral BID PRN Jesse Ryan MD   1 mg at 12/14/18 2316    [START ON 12/16/2018] Amiodarone 50 mg (1/2 X 100 mg tablet)  50 mg Oral Every other day Jesse Ryan MD        amiodarone tablet 100 mg  100 mg Oral Every other day Jesse Ryan MD        aspirin EC tablet 81 mg  81 mg Oral Daily Jesse Ryan MD        atorvastatin tablet 40 mg  40 mg Oral Daily Jesse Ryan MD        carvedilol tablet 6.25 mg  6.25 mg Oral BID WM Jesse Ryan MD   6.25 mg at 12/14/18 2317    dextrose 50% injection 12.5 g  12.5 g Intravenous PRN Jesse Ryan MD        dextrose 50% injection 25 g  25 g Intravenous PRN Jesse Ryan MD        digoxin tablet 0.125 mg  0.125 mg Oral Daily Jesse Ryan MD        fluticasone-vilanterol 100-25 mcg/dose diskus inhaler 1 puff  1 puff Inhalation Daily Jesse Ryan MD        furosemide tablet 80 mg  80 mg Oral Daily Jesse Ryan MD   80 mg at 12/14/18 2317    gabapentin tablet 600 mg  600 mg Oral TID Jesse Ryan MD   600 mg at 12/14/18 2317    glucagon (human recombinant) injection 1 mg  1 mg Intramuscular PRN Jesse Ryan MD        glucose chewable tablet 16 g  16 g Oral PRN Jesse Ryan MD        glucose chewable tablet 24 g  24 g Oral PRN Jesse Ryan MD        HYDROcodone-acetaminophen  mg per tablet 1 tablet  1 tablet Oral Q8H PRN Jesse Ryan MD        insulin aspart U-100 pen 0-5 Units  0-5 Units Subcutaneous QID (AC + HS) PRN Jesse Ryan MD        insulin NPH injection 15 Units  15 Units Subcutaneous BID AC Jesse Ryan MD        isosorbide mononitrate 24 hr tablet 30 mg  30 mg Oral Daily Jesse Ryan MD        labetalol 20 mg/4 mL (5 mg/mL) IV syring  10 mg Intravenous Q15 Min PRN Jesse Yang  MD [place]        nitroGLYCERIN SL tablet 0.4 mg  0.4 mg Sublingual Q5 Min PRN Jesse Ryan MD        pantoprazole EC tablet 40 mg  40 mg Oral BID Jesse Ryan MD        rOPINIRole tablet 0.5 mg  0.5 mg Oral Daily Jesse Ryan MD        sodium chloride 0.9% flush 3 mL  3 mL Intravenous Q8H Jesse Ryan MD   3 mL at 12/15/18 0600    spironolactone tablet 25 mg  25 mg Oral Daily Jesse Ryan MD        tamsulosin 24 hr capsule 0.4 mg  0.4 mg Oral Daily Jesse Ryan MD        tiotropium inhalation capsule 18 mcg  1 capsule Inhalation Daily Jesse Ryan MD        warfarin (COUMADIN) tablet 4 mg  4 mg Oral Every Mon, Wed, Fri Jesse Ryan MD   4 mg at 12/14/18 2316    warfarin (COUMADIN) tablet 5 mg  5 mg Oral Every Tues, Thurs, Sat Jesse Ryan MD        [START ON 12/16/2018] warfarin (COUMADIN) tablet 5 mg  5 mg Oral Every Sun Jesse Ryan MD           Past Medical/Surgical/Family History:  Medical:   Past Medical History:   Diagnosis Date    Acid reflux     Angina, class III     Anticoagulant long-term use     Anxiety and depression     Atherosclerosis of lower extremity with claudication     Bilateral carotid artery stenosis     CAD (coronary artery disease)     CAD (coronary artery disease) of artery bypass graft     CHF (congestive heart failure)     Chronic hepatitis C     COPD (chronic obstructive pulmonary disease)     Diabetes mellitus     Hepatitis     HHD (hypertensive heart disease)     Hypercholesteremia     Hypertension     Hypoxia     Neuropathy, lower extremity     Obstructive sleep apnea     PAF (paroxysmal atrial fibrillation)     Presence of stent in artery     Requires continuous at home supplemental oxygen     Syncope     Syncope and collapse       Surgeries:   Past Surgical History:   Procedure Laterality Date    choley      COLONOSCOPY      CORONARY ARTERY BYPASS GRAFT      x 4     Left heart cath Left 5/23/2018    Performed by Nicolás Pathak MD at [facility] CATH    LEFT HEART CATHETERIZATION Left 5/23/2018    Procedure: Left heart cath;  Surgeon: Nicolás Pathak MD;  Location: [facility] CATH;  Service: Cardiovascular;  Laterality: Left;    stent in artery      UPPER GASTROINTESTINAL ENDOSCOPY        Family:   Family History   Problem Relation Age of Onset    Liver disease Father     Liver disease Sister    Non-contributory    Social History:    Substance Abuse/Dependence History:  Tobacco: Former smoker, heavy use in the past 4 packs per day  EtOH: No  Ilicits: No    Occupational/Employment History:  Occupation: No      Current Evaluation:     Vital Signs:   Vitals:    12/15/18 0555   BP: (!) 116/56   Pulse: (!) 51   Resp: 16   Temp: 98 °F (36.7 °C)      Neurological Exam   Mental Status:  Alert and oriented to self, place, and time.     Language:  No aphasia, no dysarthria.     Cranial Nerves:  Visual fields were intact to confrontation, no RAPD, no anisocoria, EOM intact bilaterally, V1-V3 with good sensation to light touch, no facial asymmetry, hearing grossly intact, palate, and tongue midline. Good shoulder rug.     Motor:  Strength: 5/5 in all 4 extremities through out.   Tone: Good muscle tone.    No pronator drift    DTRs:  Symmetric and 2+ through out.     Sensation:  Intact to light touch through out.  Vibration and proprioception intact    Cerebellar:  Good finger to nose.  Good heal to shin  Rapid alternating movement without any problems.    Gait and Stand:  Gait and stand: Deferred    LABORATORY STUDIES:  Recent Results (from the past 24 hour(s))   CBC auto differential    Collection Time: 12/14/18  9:22 PM   Result Value Ref Range    WBC 7.58 3.90 - 12.70 K/uL    RBC 5.31 4.60 - 6.20 M/uL    Hemoglobin 15.9 14.0 - 18.0 g/dL    Hematocrit 49.9 40.0 - 54.0 %    MCV 94 82 - 98 fL    MCH 29.9 27.0 - 31.0 pg    MCHC 31.9 (L) 32.0 - 36.0 g/dL    RDW 15.8 (H) 11.5 - 14.5 %     Platelets 120 (L) 150 - 350 K/uL    MPV 10.6 9.2 - 12.9 fL    Gran # (ANC) 4.7 1.8 - 7.7 K/uL    Lymph # 2.1 1.0 - 4.8 K/uL    Mono # 0.7 0.3 - 1.0 K/uL    Eos # 0.1 0.0 - 0.5 K/uL    Baso # 0.02 0.00 - 0.20 K/uL    Gran% 62.3 38.0 - 73.0 %    Lymph% 27.2 18.0 - 48.0 %    Mono% 8.8 4.0 - 15.0 %    Eosinophil% 1.1 0.0 - 8.0 %    Basophil% 0.3 0.0 - 1.9 %    Differential Method Automated    Comprehensive metabolic panel    Collection Time: 12/14/18  9:22 PM   Result Value Ref Range    Sodium 142 136 - 145 mmol/L    Potassium 4.4 3.5 - 5.1 mmol/L    Chloride 108 95 - 110 mmol/L    CO2 25 23 - 29 mmol/L    Glucose 102 70 - 110 mg/dL    BUN, Bld 16 8 - 23 mg/dL    Creatinine 1.2 0.5 - 1.4 mg/dL    Calcium 9.3 8.7 - 10.5 mg/dL    Total Protein 7.4 6.0 - 8.4 g/dL    Albumin 3.6 3.5 - 5.2 g/dL    Total Bilirubin 1.4 (H) 0.1 - 1.0 mg/dL    Alkaline Phosphatase 67 55 - 135 U/L    AST 20 10 - 40 U/L    ALT 16 10 - 44 U/L    Anion Gap 9 8 - 16 mmol/L    eGFR if African American >60 >60 mL/min/1.73 m^2    eGFR if non African American >60 >60 mL/min/1.73 m^2   Protime-INR    Collection Time: 12/14/18  9:22 PM   Result Value Ref Range    Prothrombin Time 21.6 (H) 9.0 - 12.5 sec    INR 2.1 (H) 0.8 - 1.2   Blood culture    Collection Time: 12/14/18  9:22 PM   Result Value Ref Range    Blood Culture, Routine No Growth to date    Lactic acid, plasma    Collection Time: 12/14/18  9:22 PM   Result Value Ref Range    Lactate (Lactic Acid) 1.7 0.5 - 2.2 mmol/L   Hemoglobin A1c    Collection Time: 12/14/18  9:22 PM   Result Value Ref Range    Hemoglobin A1C 6.6 (H) 4.0 - 5.6 %    Estimated Avg Glucose 143 (H) 68 - 131 mg/dL   Lipid panel    Collection Time: 12/14/18  9:22 PM   Result Value Ref Range    Cholesterol 102 (L) 120 - 199 mg/dL    Triglycerides 93 30 - 150 mg/dL    HDL 20 (L) 40 - 75 mg/dL    LDL Cholesterol 63.4 63.0 - 159.0 mg/dL    HDL/Chol Ratio 19.6 (L) 20.0 - 50.0 %    Total Cholesterol/HDL Ratio 5.1 (H) 2.0 - 5.0     Non-HDL Cholesterol 82 mg/dL   TSH    Collection Time: 12/14/18  9:22 PM   Result Value Ref Range    TSH 1.733 0.400 - 4.000 uIU/mL   CK-MB    Collection Time: 12/14/18  9:22 PM   Result Value Ref Range    CPK 72 20 - 200 U/L    CPK MB 3.2 0.1 - 6.5 ng/mL    MB% 4.4 0.0 - 5.0 %   Troponin I    Collection Time: 12/14/18  9:22 PM   Result Value Ref Range    Troponin I 0.224 (H) 0.000 - 0.026 ng/mL   Blood culture    Collection Time: 12/14/18  9:29 PM   Result Value Ref Range    Blood Culture, Routine No Growth to date    Vitamin B12    Collection Time: 12/14/18  9:29 PM   Result Value Ref Range    Vitamin B-12 315 210 - 950 pg/mL   Folate    Collection Time: 12/14/18  9:29 PM   Result Value Ref Range    Folate 10.3 4.0 - 24.0 ng/mL   Urinalysis    Collection Time: 12/14/18 11:57 PM   Result Value Ref Range    Specimen UA Urine, Clean Catch     Color, UA Yellow Yellow, Straw, Sharita    Appearance, UA Clear Clear    pH, UA 6.0 5.0 - 8.0    Specific Gravity, UA >=1.030 (A) 1.005 - 1.030    Protein, UA 1+ (A) Negative    Glucose, UA Negative Negative    Ketones, UA Negative Negative    Bilirubin (UA) Negative Negative    Occult Blood UA 3+ (A) Negative    Nitrite, UA Negative Negative    Urobilinogen, UA 1.0 <2.0 EU/dL    Leukocytes, UA Negative Negative   Urinalysis Microscopic    Collection Time: 12/14/18 11:57 PM   Result Value Ref Range    RBC, UA 30 (H) 0 - 4 /hpf    WBC, UA 5 0 - 5 /hpf    Bacteria, UA Rare None-Occ /hpf    Squam Epithel, UA 2 /hpf    Hyaline Casts, UA 1 0-1/lpf /lpf    Microscopic Comment SEE COMMENT    Comprehensive metabolic panel    Collection Time: 12/15/18  5:37 AM   Result Value Ref Range    Sodium 140 136 - 145 mmol/L    Potassium 4.1 3.5 - 5.1 mmol/L    Chloride 101 95 - 110 mmol/L    CO2 30 (H) 23 - 29 mmol/L    Glucose 104 70 - 110 mg/dL    BUN, Bld 15 8 - 23 mg/dL    Creatinine 1.3 0.5 - 1.4 mg/dL    Calcium 9.0 8.7 - 10.5 mg/dL    Total Protein 7.1 6.0 - 8.4 g/dL    Albumin 3.5 3.5 - 5.2  g/dL    Total Bilirubin 1.7 (H) 0.1 - 1.0 mg/dL    Alkaline Phosphatase 57 55 - 135 U/L    AST 19 10 - 40 U/L    ALT 15 10 - 44 U/L    Anion Gap 9 8 - 16 mmol/L    eGFR if African American >60 >60 mL/min/1.73 m^2    eGFR if non African American 58 (A) >60 mL/min/1.73 m^2   Magnesium    Collection Time: 12/15/18  5:37 AM   Result Value Ref Range    Magnesium 1.7 1.6 - 2.6 mg/dL   Phosphorus    Collection Time: 12/15/18  5:37 AM   Result Value Ref Range    Phosphorus 3.1 2.7 - 4.5 mg/dL   CBC auto differential    Collection Time: 12/15/18  5:37 AM   Result Value Ref Range    WBC 9.59 3.90 - 12.70 K/uL    RBC 5.10 4.60 - 6.20 M/uL    Hemoglobin 15.3 14.0 - 18.0 g/dL    Hematocrit 48.0 40.0 - 54.0 %    MCV 94 82 - 98 fL    MCH 30.0 27.0 - 31.0 pg    MCHC 31.9 (L) 32.0 - 36.0 g/dL    RDW 15.7 (H) 11.5 - 14.5 %    Platelets 120 (L) 150 - 350 K/uL    MPV 10.7 9.2 - 12.9 fL    Gran # (ANC) 5.9 1.8 - 7.7 K/uL    Lymph # 2.7 1.0 - 4.8 K/uL    Mono # 0.9 0.3 - 1.0 K/uL    Eos # 0.1 0.0 - 0.5 K/uL    Baso # 0.02 0.00 - 0.20 K/uL    Gran% 61.5 38.0 - 73.0 %    Lymph% 27.7 18.0 - 48.0 %    Mono% 8.9 4.0 - 15.0 %    Eosinophil% 1.5 0.0 - 8.0 %    Basophil% 0.2 0.0 - 1.9 %    Differential Method Automated    APTT    Collection Time: 12/15/18  5:37 AM   Result Value Ref Range    aPTT 33.6 (H) 21.0 - 32.0 sec   Protime-INR    Collection Time: 12/15/18  5:37 AM   Result Value Ref Range    Prothrombin Time 22.9 (H) 9.0 - 12.5 sec    INR 2.2 (H) 0.8 - 1.2   Troponin I    Collection Time: 12/15/18  5:37 AM   Result Value Ref Range    Troponin I 0.111 (H) 0.000 - 0.026 ng/mL   Brain natriuretic peptide    Collection Time: 12/15/18  5:37 AM   Result Value Ref Range     (H) 0 - 99 pg/mL   POCT glucose    Collection Time: 12/15/18  6:17 AM   Result Value Ref Range    POCT Glucose 105 70 - 110 mg/dL       RADIOLOGY STUDIES:  I have personally reviewed the images performed.     HEAD CT: As above    BRAIN MRI As above      Assessment:  P      Mr. Stevens is 64 y/o male R handed with PMHx of CAD/CABG with multiple BLE stents on chronic anticoagulation with Coumadin, severe COPD on 5.5 liters of home O2, HFpEF (EF 55%) and Type 2 DM (hgbA1C 6.6 - was 13.1 in July 2017) who was found outside face down next to the car by his spouse this afternoon around noon. Neuro was called to r/o CVA    1. Syncope in the context of severe hypoxemia  -on chronic nasal Ox  -neuro exam unremarkable  -Labs: A1c 6.1, LDL 63, folate 10.3 and B12 31  -CT H unremarkable  -MRI brain and MRA head and neck: No findings to suggest acute infarct or hemorrhage.  2. No hemodynamically significant stenosis, occlusion, av malformation, or aneurysm of the anterior or posterior intracranial circulation, nor of the major arterial vasculature of the neck.  3. Probable sequela of chronic microvascular ischemic change.   -TTE pending official reading  -BP goal should be less than 140/90 mmHg  -Please supplement with Cyanocobalamin 1000 mg 1 tab daily for 6 months.       Differential diagnosis was explained to the patient. All questions were answered. Patient understood and agreed to adhere to plan.       Case discussed with     Will sign off. Please do not hesitate to contact us for further assistance in the care of this patient. We appreciate the consult.     Appreciate the consult.     Jasmin. MD  [facility]

## 2018-12-15 NOTE — PLAN OF CARE
Seen b y Dr Neville' service On telemetry and fall risk monitoring.C/o nasal drynnes,Saline spray given ,O2 humidifier placed on o2.MRI/MRA of neck and head done .Hoping to go home pending results. Family at bedside.

## 2018-12-15 NOTE — PLAN OF CARE
Problem: Occupational Therapy Goal  Goal: Occupational Therapy Goal  Goals to be met by: 1/15/19     Patient will increase functional independence with ADLs by performing:    UE Dressing with Modified [place].  LE Dressing with Modified [place].  Grooming while standing with Modified [place].  Toileting from toilet with Modified [place] for hygiene and clothing management.   Toilet transfer to toilet with Modified [place].  Pt will have good understanding of energy conservation/work simplification tech    Outcome: Ongoing (interventions implemented as appropriate)  OT eval performed, report to follow    No anticipated discharge needs

## 2018-12-15 NOTE — PT/OT/SLP EVAL
Occupational Therapy   Evaluation    Name: Shaan Brown  MRN: [id number]  Admitting Diagnosis:  Syncope and collapse      Recommendations:     Discharge Recommendations: other (see comments)(home)  Discharge Equipment Recommendations:  none  Barriers to discharge:  None    History:     Occupational Profile:  Living Environment: Lives w/spouse in  3 [address] HR, WIS w/seat  Previous level of function: mod I  Roles and Routines:  Drives, active within cardiopulmonary abilities  Equipment Used at Home:  shower chair, raised toilet, oxygen, rollator, walker, rolling  Assistance upon Discharge: family    Past Medical History:   Diagnosis Date    Acid reflux     Angina, class III     Anticoagulant long-term use     Anxiety and depression     Atherosclerosis of lower extremity with claudication     Bilateral carotid artery stenosis     CAD (coronary artery disease)     CAD (coronary artery disease) of artery bypass graft     CHF (congestive heart failure)     Chronic hepatitis C     COPD (chronic obstructive pulmonary disease)     Diabetes mellitus     Hepatitis     HHD (hypertensive heart disease)     Hypercholesteremia     Hypertension     Hypoxia     Neuropathy, lower extremity     Obstructive sleep apnea     PAF (paroxysmal atrial fibrillation)     Presence of stent in artery     Requires continuous at home supplemental oxygen     Syncope     Syncope and collapse        Past Surgical History:   Procedure Laterality Date    choley      COLONOSCOPY      CORONARY ARTERY BYPASS GRAFT      x 4    Left heart cath Left 5/23/2018    Performed [facility] at [facility] C[facility]    LEFT HEART CATHETERIZATION Left 5/23/2018    Procedure: Left heart cath;  Surgeon: Nicolás Pathak MD;  Location: [facility] CATH;  Service: Cardiovascular;  Laterality: Left;    stent in artery      UPPER GASTROINTESTINAL ENDOSCOPY         Subjective     Chief Complaint: SOB/fatigue  Patient/Family Comments/goals: to go  "home    Pain/Comfort:  · Pain Rating 1: other (see comments)(did not rate)  · Location 1: (generalized chronic pain)    Patients cultural, spiritual, Yazidism conflicts given the current situation:      Objective:     Communicated with: nurse prior to session.  Patient found with: all lines intact, call button in reach, family present and seated EOB finishing lunch and peripheral IV, oxygen, telemetry upon OT entry to room.    General Precautions: Standard, fall, respiratory   Orthopedic Precautions:    Braces:       Occupational Performance:    Bed Mobility:    · NT    Functional Mobility/Transfers:  · Patient completed Sit <> Stand Transfer with modified independence  with  no assistive device   · Patient completed Bed <> Chair Transfer using Step Transfer technique with modified independence and supervision with no assistive device  · Functional Mobility: supervision no AD I nroom    Activities of Daily Living:  · Feeding:  independence    · Lower Body Dressing: moderate assistance for socks    Cognitive/Visual Perceptual:  AO4    Physical Exam:  BUE AROM/strength WFL  Normal sit balance, good stand balance  Fair activity tolerance    AMPAC 6 Click ADL:  AMPAC Total Score: 21    Treatment & Education:  Pt/fam educated on role of OT/POC  Education:    Patient left seated EOB with all lines intact, call button in reach, nurse notified and family present    Assessment:     Shaan Brown is a 63 y.o. male with a medical diagnosis of Syncope and collapse.  He presents with the following performance deficits affecting function: impaired endurance, impaired cardiopulmonary response to activity, impaired functional mobilty, impaired self care skills.      Rehab Prognosis: Good; patient would benefit from acute skilled OT services to address these deficits and reach maximum level of function.         Clinical Decision Makin.  OT Low:  "Pt evaluation falls under low complexity for evaluation coding due to " "performance deficits noted in 1-3 areas as stated above and 0 co-morbities affecting current functional status. Data obtained from problem focused assessments. No modifications or assistance was required for completion of evaluation. Only brief occupational profile and history review completed."     Plan:     Patient to be seen 3 x/week to address the above listed problems via self-care/home management, therapeutic activities, therapeutic exercises  · Plan of Care Expires: 01/15/19  · Plan of Care Reviewed with: patient, family    This Plan of care has been discussed with the patient who was involved in its development and understands and is in agreement with the identified goals and treatment plan    GOALS:   Multidisciplinary Problems     Occupational Therapy Goals        Problem: Occupational Therapy Goal    Goal Priority Disciplines Outcome Interventions   Occupational Therapy Goal     OT, PT/OT Ongoing (interventions implemented as appropriate)    Description:  Goals to be met by: 1/15/19     Patient will increase functional independence with ADLs by performing:    UE Dressing with Modified [place].  LE Dressing with Modified [place].  Grooming while standing with Modified [place].  Toileting from toilet with Modified [place] for hygiene and clothing management.   Toilet transfer to toilet with Modified [place].  Pt will have good understanding of energy conservation/work simplification tech                      Time Tracking:     OT Date of Treatment: 12/15/18  OT Start Time: 1310  OT Stop Time: 1326  OT Total Time (min): 16 min    Billable Minutes:Evaluation 16    Castillo Uriarte OT  12/15/2018    "

## 2018-12-15 NOTE — PLAN OF CARE
Problem: Adult Inpatient Plan of Care  Goal: Plan of Care Review  Outcome: Ongoing (interventions implemented as appropriate)  Plan of care reviewed with patient, understanding verbalized. Pt remains SB on tele. Complaint of headache overnight. 5 L o2 per NC maintained. Family remains at bedside. Bed alarm on, call light in reach, fall precautions in place. Will continue to monitor.

## 2018-12-16 VITALS
HEIGHT: 71 IN | BODY MASS INDEX: 39.26 KG/M2 | OXYGEN SATURATION: 93 % | TEMPERATURE: 97 F | HEART RATE: 80 BPM | DIASTOLIC BLOOD PRESSURE: 53 MMHG | WEIGHT: 280.44 LBS | RESPIRATION RATE: 22 BRPM | SYSTOLIC BLOOD PRESSURE: 105 MMHG

## 2018-12-16 LAB
ALBUMIN SERPL BCP-MCNC: 3.5 G/DL
ALP SERPL-CCNC: 57 U/L
ALT SERPL W/O P-5'-P-CCNC: 16 U/L
ANION GAP SERPL CALC-SCNC: 10 MMOL/L
AORTIC ROOT ANNULUS: 2.93 CM
AORTIC VALVE CUSP SEPERATION: 0.78 CM
AST SERPL-CCNC: 18 U/L
AV INDEX (PROSTH): 0.48
AV MEAN GRADIENT: 9.49 MMHG
AV PEAK GRADIENT: 17.31 MMHG
AV VALVE AREA: 1.65 CM2
BACTERIA UR CULT: NORMAL
BASOPHILS # BLD AUTO: 0.03 K/UL
BASOPHILS NFR BLD: 0.4 %
BILIRUB SERPL-MCNC: 1.7 MG/DL
BSA FOR ECHO PROCEDURE: 2.52 M2
BUN SERPL-MCNC: 18 MG/DL
CALCIUM SERPL-MCNC: 9 MG/DL
CHLORIDE SERPL-SCNC: 101 MMOL/L
CO2 SERPL-SCNC: 28 MMOL/L
CREAT SERPL-MCNC: 1.3 MG/DL
CV ECHO LV RWT: 0.36 CM
DIFFERENTIAL METHOD: ABNORMAL
DOP CALC AO PEAK VEL: 2.08 M/S
DOP CALC AO VTI: 44 CM
DOP CALC LVOT AREA: 3.46 CM2
DOP CALC LVOT DIAMETER: 2.1 CM
DOP CALC LVOT STROKE VOLUME: 72.7 CM3
DOP CALCLVOT PEAK VEL VTI: 21 CM
E WAVE DECELERATION TIME: 221.7 MSEC
E/A RATIO: 1.01
ECHO LV POSTERIOR WALL: 0.95 CM (ref 0.6–1.1)
EOSINOPHIL # BLD AUTO: 0.2 K/UL
EOSINOPHIL NFR BLD: 2 %
ERYTHROCYTE [DISTWIDTH] IN BLOOD BY AUTOMATED COUNT: 15.7 %
EST. GFR  (AFRICAN AMERICAN): >60 ML/MIN/1.73 M^2
EST. GFR  (NON AFRICAN AMERICAN): 58 ML/MIN/1.73 M^2
FRACTIONAL SHORTENING: 24 % (ref 28–44)
GLUCOSE SERPL-MCNC: 101 MG/DL
HCT VFR BLD AUTO: 46.7 %
HGB BLD-MCNC: 14.9 G/DL
INTERVENTRICULAR SEPTUM: 1.03 CM (ref 0.6–1.1)
IVRT: 0.06 MSEC
LA MAJOR: 5.03 CM
LA MINOR: 5.35 CM
LA WIDTH: 4.21 CM
LEFT ATRIUM SIZE: 5.32 CM
LEFT ATRIUM VOLUME INDEX: 40.5 ML/M2
LEFT ATRIUM VOLUME: 98.71 CM3
LEFT INTERNAL DIMENSION IN SYSTOLE: 4 CM (ref 2.1–4)
LEFT VENTRICLE DIASTOLIC VOLUME INDEX: 54.01 ML/M2
LEFT VENTRICLE DIASTOLIC VOLUME: 131.49 ML
LEFT VENTRICLE MASS INDEX: 79.4 G/M2
LEFT VENTRICLE SYSTOLIC VOLUME INDEX: 28.7 ML/M2
LEFT VENTRICLE SYSTOLIC VOLUME: 69.91 ML
LEFT VENTRICULAR INTERNAL DIMENSION IN DIASTOLE: 5.23 CM (ref 3.5–6)
LEFT VENTRICULAR MASS: 193.42 G
LV LATERAL E/E' RATIO: 7.4
LYMPHOCYTES # BLD AUTO: 2.3 K/UL
LYMPHOCYTES NFR BLD: 30.2 %
MCH RBC QN AUTO: 29.6 PG
MCHC RBC AUTO-ENTMCNC: 31.9 G/DL
MCV RBC AUTO: 93 FL
MONOCYTES # BLD AUTO: 0.8 K/UL
MONOCYTES NFR BLD: 9.8 %
MV PEAK A VEL: 0.73 M/S
MV PEAK E VEL: 0.74 M/S
NEUTROPHILS # BLD AUTO: 4.4 K/UL
NEUTROPHILS NFR BLD: 57.3 %
PISA TR MAX VEL: 3.24 M/S
PLATELET # BLD AUTO: 116 K/UL
PMV BLD AUTO: 10.1 FL
POCT GLUCOSE: 86 MG/DL (ref 70–110)
POTASSIUM SERPL-SCNC: 3.9 MMOL/L
PROT SERPL-MCNC: 6.9 G/DL
PULM VEIN S/D RATIO: 0.93
PV PEAK D VEL: 0.55 M/S
PV PEAK S VEL: 0.51 M/S
PV PEAK VELOCITY: 0.69 CM/S
RA MAJOR: 5.59 CM
RBC # BLD AUTO: 5.03 M/UL
SODIUM SERPL-SCNC: 139 MMOL/L
TDI LATERAL: 0.1
TR MAX PG: 41.99 MMHG
WBC # BLD AUTO: 7.68 K/UL

## 2018-12-16 PROCEDURE — G0378 HOSPITAL OBSERVATION PER HR: HCPCS

## 2018-12-16 PROCEDURE — 25000003 PHARM REV CODE 250: Performed by: STUDENT IN AN ORGANIZED HEALTH CARE EDUCATION/TRAINING PROGRAM

## 2018-12-16 PROCEDURE — 94640 AIRWAY INHALATION TREATMENT: CPT

## 2018-12-16 PROCEDURE — A4216 STERILE WATER/SALINE, 10 ML: HCPCS | Performed by: STUDENT IN AN ORGANIZED HEALTH CARE EDUCATION/TRAINING PROGRAM

## 2018-12-16 PROCEDURE — 94761 N-INVAS EAR/PLS OXIMETRY MLT: CPT

## 2018-12-16 PROCEDURE — 25000242 PHARM REV CODE 250 ALT 637 W/ HCPCS: Performed by: STUDENT IN AN ORGANIZED HEALTH CARE EDUCATION/TRAINING PROGRAM

## 2018-12-16 PROCEDURE — 27000221 HC OXYGEN, UP TO 24 HOURS

## 2018-12-16 PROCEDURE — 85025 COMPLETE CBC W/AUTO DIFF WBC: CPT

## 2018-12-16 PROCEDURE — 80053 COMPREHEN METABOLIC PANEL: CPT

## 2018-12-16 PROCEDURE — 36415 COLL VENOUS BLD VENIPUNCTURE: CPT

## 2018-12-16 RX ORDER — AMIODARONE HYDROCHLORIDE 200 MG/1
100 TABLET ORAL EVERY OTHER DAY
Qty: 8 TABLET | Refills: 11 | Status: SHIPPED | OUTPATIENT
Start: 2018-12-16 | End: 2019-01-01

## 2018-12-16 RX ORDER — LANOLIN ALCOHOL/MO/W.PET/CERES
1000 CREAM (GRAM) TOPICAL DAILY
COMMUNITY
Start: 2018-12-16 | End: 2019-08-05

## 2018-12-16 RX ADMIN — SPIRONOLACTONE 25 MG: 25 TABLET ORAL at 09:12

## 2018-12-16 RX ADMIN — ATORVASTATIN CALCIUM 40 MG: 40 TABLET, FILM COATED ORAL at 09:12

## 2018-12-16 RX ADMIN — TAMSULOSIN HYDROCHLORIDE 0.4 MG: 0.4 CAPSULE ORAL at 09:12

## 2018-12-16 RX ADMIN — CYANOCOBALAMIN TAB 1000 MCG 1000 MCG: 1000 TAB at 09:12

## 2018-12-16 RX ADMIN — CARVEDILOL 6.25 MG: 6.25 TABLET, FILM COATED ORAL at 09:12

## 2018-12-16 RX ADMIN — SODIUM CHLORIDE 500 ML: 0.9 INJECTION, SOLUTION INTRAVENOUS at 09:12

## 2018-12-16 RX ADMIN — TIOTROPIUM BROMIDE 18 MCG: 18 CAPSULE ORAL; RESPIRATORY (INHALATION) at 11:12

## 2018-12-16 RX ADMIN — GABAPENTIN 600 MG: 600 TABLET, FILM COATED ORAL at 09:12

## 2018-12-16 RX ADMIN — Medication 3 ML: at 06:12

## 2018-12-16 RX ADMIN — FUROSEMIDE 80 MG: 40 TABLET ORAL at 09:12

## 2018-12-16 RX ADMIN — ASPIRIN 81 MG: 81 TABLET, COATED ORAL at 09:12

## 2018-12-16 RX ADMIN — PANTOPRAZOLE SODIUM 40 MG: 40 TABLET, DELAYED RELEASE ORAL at 09:12

## 2018-12-16 RX ADMIN — FLUTICASONE FUROATE AND VILANTEROL TRIFENATATE 1 PUFF: 100; 25 POWDER RESPIRATORY (INHALATION) at 08:12

## 2018-12-16 RX ADMIN — ROPINIROLE HYDROCHLORIDE 0.5 MG: 0.5 TABLET, FILM COATED ORAL at 09:12

## 2018-12-16 NOTE — DISCHARGE SUMMARY
Discharge Summary      Admit Date: 12/14/2018    Discharge Date and Time: 12/16/2018    Attending Physician: Severyn Yaroshevsky, MD     Discharge Physician: Franc Benito III, MD     Principal Diagnoses: Syncope and collapse  The primary encounter diagnosis was Coronary artery disease involving native coronary artery of native heart without angina pectoris. Diagnoses of TIA (transient ischemic attack), Stroke, and Syncope were also pertinent to this visit.    Discharged Condition: stable    Hospital Course:     63 year old male with CAD/CABG with multiple BLE stents on chronic anticoagulation with Coumadin, severe COPD on 5.5 liters of home O2, HFpEF (EF 55%) and Type 2 DM (hgbA1C 6.6 - was 13.1 in July 2017) presented as a transfer patient for a stroke workup for syncope along with a neurology evaluation. Patient reported chest pain and he passed out and was found by EMS with O2 60's but his oxygen level improved after bag mask. Patient reported slurring speech and a was stroke activated. CT Head was unremarkable. The patient had an MRI at [place] which was negative for acute ischemia and neurology did not find any evidence of a stroke and they recommended cyanocobalamin 1000 mg for 6 months. He should have a Holter monitor outpatient to assess for arhythmias. No arhythmia were noted during his hospital stay and he had no more syncopal events. His symptoms resolved and he was discharged with follow up with his PCP.     Syncope Vs Stroke/TIA  Pt found down by wife with subsequent slurred speech, but no specific seizure/post-ictal findings.  Telestroke consulted and pt receiving work-up for Stroke/TIA.  No current focal deficits.  CT head neg, EKG with chronic changes, nothing acute   Troponin elevated but down trending and EKGs with ST depression similar to past  MRI negative for acute ischemic event   Bubble study last year negative in 2017   Neurology evaluated patient cyanocobalamin for 6 months     CAD/CABG  4  vessel CABG in pt's 40's and with balloon angioplasty in May 2018  On coumadin chronically, INR 2.2  Pt has chronic angina well-known to his cardiologist  Resuming coumadin after bedside swallow study  Also on lipitor 40 as home med, ASA 81- restarted as 2/2 stroke prevention as well  No chest pain      Acute on Chronic DiastolicCongestive Heart failure  Echo 7/17 with EF of 55% but indeterminate LV diastolic function  Repeating Echo f/u with PCP      COPD  On oxygen 4.5 liters in-patient (home 5.5 liters) duo-neb breathing treatments, tiotroprium, fluticasone-vilanterol     DMT2  HgbA1C 6.6 this admission  Resuming home NPH 15 BID before meals + SS  Accuchecks q AC & HS     GERD  Protonix 40 mg BID      DVT ppx:  Resumed home coumadin     Consults: IP CONSULT TO REGISTERED DIETITIAN/NUTRITIONIST  IP CONSULT TO SOCIAL WORK/CASE MANAGEMENT  IP CONSULT TO NEUROLOGY    Disposition: Home or Self Care     Recent Labs   Lab 12/14/18  2122 12/15/18  0537 12/16/18  0606   WBC 7.58 9.59 7.68   HGB 15.9 15.3 14.9   HCT 49.9 48.0 46.7   * 120* 116*     Recent Labs   Lab 12/14/18  2122 12/15/18  0537 12/16/18  0606    140 139   K 4.4 4.1 3.9    101 101   CO2 25 30* 28   BUN 16 15 18   CREATININE 1.2 1.3 1.3   CALCIUM 9.3 9.0 9.0   PROT 7.4 7.1 6.9   BILITOT 1.4* 1.7* 1.7*   ALKPHOS 67 57 57   ALT 16 15 16   AST 20 19 18     Recent Labs   Lab 12/15/18  1546 12/15/18  2052 12/16/18  0553   POCTGLUCOSE 125* 147* 86     Recent Labs   Lab 12/14/18  2122 12/15/18  0537   TROPONINI 0.224* 0.111*     Lab Results   Component Value Date    TSH 1.733 12/14/2018    TSH 0.814 02/11/2015     @BNP@    X-ray Orbit Eye Foreign Body Bilat    Result Date: 12/15/2018  EXAMINATION: XR ORBIT EYE FOREIGN BODY BILAT CLINICAL HISTORY: possible foreign body, MRI clearance; TECHNIQUE: AP and lateral radiographs of the orbits COMPARISON: None FINDINGS: No radiopaque foreign bodies are seen.  The visualized paranasal sinuses are clear.      No radiopaque foreign bodies are seen. Electronically signed by: Shahla Gtz MD Date:    12/15/2018 Time:    10:28    X-ray Chest 1 View    Result Date: 12/15/2018  EXAMINATION: XR CHEST 1 VIEW CLINICAL HISTORY: syncope with CHF and severe COPD (on 5.5 liters home O2); TECHNIQUE: Single frontal view of the chest was performed. COMPARISON: 05/21/2018 FINDINGS: Monitoring leads overlie the chest.  There is postoperative change of prior median sternotomy and possible CABG procedure.  The cardiomediastinal silhouette there is stable prominence of the cardiomediastinal silhouette.  The visualized airway is unremarkable.  The lungs are symmetrically expanded with persistent coarse interstitial attenuation.  No focal consolidation, significant volume of pleural fluid or pneumothorax.  Visualized osseous structures are intact.     As above. Electronically signed by: Jaqueline Kramer MD Date:    12/15/2018 Time:    00:01    Mra Brain    Result Date: 12/15/2018  EXAMINATION: MRI BRAIN W WO CONTRAST; MRA BRAIN WITHOUT CONTRAST; MRA NECK WITH CONTRAST CLINICAL HISTORY: Stroke; TECHNIQUE: Multiplanar multisequence MR imaging of the brain was performed before and after the administration of 10 mL Gadavist intravenous contrast.  MRI arteriography was performed of the adbgit-wx-Qhsuhe using a time-of-flight technique.  MRI arteriography was performed, postcontrast, of the major arterial vascular structures of the neck. COMPARISON: None FINDINGS: There is no intracranial mass, or acute hemorrhage. There is no restricted diffusion to suggest acute ischemia.  There are a few punctate foci of T2/flair signal abnormality within the white matter and rose, likely related to chronic microvascular ischemic change.  There is no hydrocephalus.  There are 2 punctate foci of gradient susceptibility, 1 within the left parietal lobe, the other within the posterior right parietal lobe, suggesting remote hemosiderin deposition.  There are no  significant extra-axial or extracranial abnormalities. The globes, orbits, pituitary gland, pineal gland and craniocervical junction are normal in configuration.  Following administration of contrast, there are no abnormal areas of enhancement.  The major vascular flow voids are patent.  MRI arteriography was performed of the xwyufb-zx-Pxleyu using a time-of-flight technique.  Allowing for motion artifact and wraparound artifact, no convincing hemodynamically significant stenosis, occlusion, av malformation, or aneurysm of the anterior or posterior circulation.  The right vertebral artery appears to terminate as the pica. MRI arteriography was performed of the major arterial vasculature of the neck.  The major aortic arch branch vessels are patent.  The bilateral common carotid arteries and internal carotid arteries are patent.  The origins of the bilateral vertebral arteries are patent noting the left vertebral artery is dominant.  The vertebral arteries are patent bilaterally noting the right vertebral artery likely terminates as the PICA.     1. No findings to suggest acute infarct or hemorrhage. 2. No hemodynamically significant stenosis, occlusion, av malformation, or aneurysm of the anterior or posterior intracranial circulation, nor of the major arterial vasculature of the neck. 3. Probable sequela of chronic microvascular ischemic change. Electronically signed by: Alfonso Arnold MD Date:    12/15/2018 Time:    12:31    Mri Brain W Wo Contrast    Result Date: 12/15/2018  EXAMINATION: MRI BRAIN W WO CONTRAST; MRA BRAIN WITHOUT CONTRAST; MRA NECK WITH CONTRAST CLINICAL HISTORY: Stroke; TECHNIQUE: Multiplanar multisequence MR imaging of the brain was performed before and after the administration of 10 mL Gadavist intravenous contrast.  MRI arteriography was performed of the wtqtum-km-Mlnbjy using a time-of-flight technique.  MRI arteriography was performed, postcontrast, of the major arterial vascular  structures of the neck. COMPARISON: None FINDINGS: There is no intracranial mass, or acute hemorrhage. There is no restricted diffusion to suggest acute ischemia.  There are a few punctate foci of T2/flair signal abnormality within the white matter and rose, likely related to chronic microvascular ischemic change.  There is no hydrocephalus.  There are 2 punctate foci of gradient susceptibility, 1 within the left parietal lobe, the other within the posterior right parietal lobe, suggesting remote hemosiderin deposition.  There are no significant extra-axial or extracranial abnormalities. The globes, orbits, pituitary gland, pineal gland and craniocervical junction are normal in configuration.  Following administration of contrast, there are no abnormal areas of enhancement.  The major vascular flow voids are patent.  MRI arteriography was performed of the xxytoj-dj-Axqywo using a time-of-flight technique.  Allowing for motion artifact and wraparound artifact, no convincing hemodynamically significant stenosis, occlusion, av malformation, or aneurysm of the anterior or posterior circulation.  The right vertebral artery appears to terminate as the pica. MRI arteriography was performed of the major arterial vasculature of the neck.  The major aortic arch branch vessels are patent.  The bilateral common carotid arteries and internal carotid arteries are patent.  The origins of the bilateral vertebral arteries are patent noting the left vertebral artery is dominant.  The vertebral arteries are patent bilaterally noting the right vertebral artery likely terminates as the PICA.     1. No findings to suggest acute infarct or hemorrhage. 2. No hemodynamically significant stenosis, occlusion, av malformation, or aneurysm of the anterior or posterior intracranial circulation, nor of the major arterial vasculature of the neck. 3. Probable sequela of chronic microvascular ischemic change. Electronically signed by: Alfonso  MD Catalina Date:    12/15/2018 Time:    12:31    Mra Neck With Contrast    Result Date: 12/15/2018  EXAMINATION: MRI BRAIN W WO CONTRAST; MRA BRAIN WITHOUT CONTRAST; MRA NECK WITH CONTRAST CLINICAL HISTORY: Stroke; TECHNIQUE: Multiplanar multisequence MR imaging of the brain was performed before and after the administration of 10 mL Gadavist intravenous contrast.  MRI arteriography was performed of the uzbbdp-vw-Bbnocm using a time-of-flight technique.  MRI arteriography was performed, postcontrast, of the major arterial vascular structures of the neck. COMPARISON: None FINDINGS: There is no intracranial mass, or acute hemorrhage. There is no restricted diffusion to suggest acute ischemia.  There are a few punctate foci of T2/flair signal abnormality within the white matter and rose, likely related to chronic microvascular ischemic change.  There is no hydrocephalus.  There are 2 punctate foci of gradient susceptibility, 1 within the left parietal lobe, the other within the posterior right parietal lobe, suggesting remote hemosiderin deposition.  There are no significant extra-axial or extracranial abnormalities. The globes, orbits, pituitary gland, pineal gland and craniocervical junction are normal in configuration.  Following administration of contrast, there are no abnormal areas of enhancement.  The major vascular flow voids are patent.  MRI arteriography was performed of the xijklz-eg-Uuwqou using a time-of-flight technique.  Allowing for motion artifact and wraparound artifact, no convincing hemodynamically significant stenosis, occlusion, av malformation, or aneurysm of the anterior or posterior circulation.  The right vertebral artery appears to terminate as the pica. MRI arteriography was performed of the major arterial vasculature of the neck.  The major aortic arch branch vessels are patent.  The bilateral common carotid arteries and internal carotid arteries are patent.  The origins of the bilateral  vertebral arteries are patent noting the left vertebral artery is dominant.  The vertebral arteries are patent bilaterally noting the right vertebral artery likely terminates as the PICA.     1. No findings to suggest acute infarct or hemorrhage. 2. No hemodynamically significant stenosis, occlusion, av malformation, or aneurysm of the anterior or posterior intracranial circulation, nor of the major arterial vasculature of the neck. 3. Probable sequela of chronic microvascular ischemic change. Electronically signed by: Alfonso Arnold MD Date:    12/15/2018 Time:    12:31      Patient Instructions:   Current Discharge Medication List      START taking these medications    Details   amiodarone (PACERONE) 200 MG Tab Take 0.5 tablets (100 mg total) by mouth every other day.  Qty: 8 tablet, Refills: 11      cyanocobalamin (VITAMIN B-12) 1000 MCG tablet Take 1 tablet (1,000 mcg total) by mouth once daily.         CONTINUE these medications which have NOT CHANGED    Details   ADVAIR DISKUS 250-50 mcg/dose diskus inhaler Inhale 1 puff into the lungs 2 (two) times daily.      albuterol (PROAIR HFA) 90 mcg/actuation inhaler Inhale 1 puff into the lungs 2 (two) times daily. Rescue      albuterol (PROVENTIL) 5 mg/mL nebulizer solution Take 2.5 mg by nebulization every 4 (four) hours as needed. Rescue       albuterol-ipratropium 2.5mg-0.5mg/3mL (DUO-NEB) 0.5 mg-3 mg(2.5 mg base)/3 mL nebulizer solution every 4 (four) hours as needed.       alprazolam (XANAX) 1 MG tablet Take 1 mg by mouth 2 (two) times daily as needed for Anxiety.       aspirin 81 MG Chew Take 81 mg by mouth once daily.      atorvastatin (LIPITOR) 40 MG tablet Take 2 tablets (80 mg total) by mouth once daily.      carvedilol (COREG) 6.25 MG tablet Take 6.25 mg by mouth 2 (two) times daily with meals.      cholecalciferol, vitamin D3, (VITAMIN D3) 400 unit Chew Take 400 Units by mouth once daily.      digoxin (LANOXIN) 125 mcg tablet Take 1 tablet (0.125 mg  total) by mouth once daily.  Qty: 30 tablet, Refills: 2      fish oil-omega-3 fatty acids 300-1,000 mg capsule Take 1 capsule by mouth 2 (two) times daily.       furosemide (LASIX) 80 MG tablet Take 80 mg by mouth once daily.      gabapentin (NEURONTIN) 600 MG tablet Take 1 tablet (600 mg total) by mouth 3 (three) times daily.  Qty: 90 tablet, Refills: 2      hydrocodone-acetaminophen 10-325mg (NORCO)  mg Tab Take 1 tablet by mouth every 6 (six) hours as needed for Pain.       insulin NPH (NOVOLIN N) 100 unit/mL injection Inject 15 Units into the skin 2 (two) times daily before meals. Relion brand.  Qty: 1 vial, Refills: 1      insulin syringe-needle U-100 1 mL 31 gauge x 5/16 Syrg Use to inject insulin twice daily prior to meals.  Qty: 100 each, Refills: 1      isosorbide mononitrate (IMDUR) 30 MG 24 hr tablet Take 1 tablet (30 mg total) by mouth once daily.  Qty: 30 tablet, Refills: 11      metFORMIN (GLUCOPHAGE) 500 MG tablet Take 1 tablet (500 mg total) by mouth daily with breakfast.      nitroGLYCERIN (NITROSTAT) 0.4 MG SL tablet Place 0.4 mg under the tongue every 5 (five) minutes as needed for Chest pain.      pantoprazole (PROTONIX) 40 MG tablet Take 40 mg by mouth 2 (two) times daily.       potassium chloride SA (K-DUR,KLOR-CON) 10 MEQ tablet Take 10 mEq by mouth once daily.      rOPINIRole (REQUIP) 0.5 MG tablet Take 1 tablet by mouth once daily.      SPIRIVA RESPIMAT 2.5 mcg/actuation Mist Inhale 2 puffs into the lungs once daily at 6am.      spironolactone (ALDACTONE) 25 MG tablet Take 1 tablet (25 mg total) by mouth once daily.      tamsulosin (FLOMAX) 0.4 mg Cp24 Take 1 capsule (0.4 mg total) by mouth once daily.      !! warfarin (COUMADIN) 4 MG tablet Take 4 mg by mouth once daily.      !! warfarin (COUMADIN) 5 MG tablet Take 1 tablet by mouth every Wednesday.        !! - Potential duplicate medications found. Please discuss with provider.          Discharge Procedure Orders   Diet diabetic      Notify your health care provider if you experience any of the following:  temperature >100.4     Notify your health care provider if you experience any of the following:  persistent nausea and vomiting or diarrhea     Notify your health care provider if you experience any of the following:  persistent dizziness, light-headedness, or visual disturbances     Notify your health care provider if you experience any of the following:  increased confusion or weakness     Notify your health care provider if you experience any of the following:  difficulty breathing or increased cough     Notify your health care provider if you experience any of the following:  severe uncontrolled pain     Activity as tolerated     33 minutes was spent on this discharge summary      Harrison Mendoza MD   12/16/2018  8:00 AM

## 2018-12-16 NOTE — PLAN OF CARE
Problem: Physical Therapy Goal  Goal: Physical Therapy Goal  Goals to be met by: 18     Patient will increase functional independence with mobility by performin. Supine <> sit with [place]  2. Sit to stand transfer with [place] and Modified [place]  3. Bed to chair transfer with [place] and Modified [place]   4. Gait  x 100 feet with Modified [place] and Supervision with or without AD.     Outcome: Ongoing (interventions implemented as appropriate)  PT evaluation was completed. Pt performed supine<>sit with SBA, sit<>stand transfer with CGAx  1 and gait 16 ft in room with O2 at 5 L via NC without AD requiring CGAx  1. Pt exhibits increased cecile and min lat sway during gait and may require a rw  at this time for safer gait pattern.

## 2018-12-16 NOTE — PLAN OF CARE
Problem: Adult Inpatient Plan of Care  Goal: Plan of Care Review  Outcome: Ongoing (interventions implemented as appropriate)  Pt on documented O2, no respiratory distress noted. Will continue to monitor.

## 2018-12-16 NOTE — PLAN OF CARE
Problem: Adult Inpatient Plan of Care  Goal: Plan of Care Review  Outcome: Ongoing (interventions implemented as appropriate)  Plan of care reviewed with patient, understanding verbalized. Pt remains SB on tele.  5 L o2 per NC maintained. Family remains at bedside. Bed alarm refused, call light in reach, fall precautions in place. Will continue to monitor.

## 2018-12-16 NOTE — PLAN OF CARE
Discharged home with instructions .Verbalizes understanding of discharge  instructions .Telemetry monitor removed .Will discontinue Saline lock once bolus completed .

## 2018-12-16 NOTE — PLAN OF CARE
Discharge orders noted, no HH or HME ordered.    Future Appointments   Date Time Provider Department Center   12/18/2018  2:00 PM Indra Booker,  [facility]       Pt's nurse will go over medications/signs and symptoms prior to discharge       12/16/18 0830   Final Note   Assessment Type Final Discharge Note   Anticipated Discharge Disposition Home   What phone number can be called within the next 1-3 days to see how you are doing after discharge? [phone]   Hospital Follow Up  Appt(s) scheduled? No  (Offices closed for weekend. Patient to schedule own follow up appointment.)   Right Care Referral Info   Post Acute Recommendation No Care     Frances Real, RN Transitional Navigator  [phone]

## 2018-12-16 NOTE — PLAN OF CARE
Hr in the mid 40s while asleep,Asymptomatic,kat 96/49.Dr Chaparro service notified, digoxin and isosorbide held,will give 500 cc ns bolus as ordered before discharge.

## 2018-12-16 NOTE — PLAN OF CARE
Continued on telemetry and fall risk monitoring No True red alarm ectopy,Bed alarm on although getting out of bed without calling as directed several times. Safety education reinforced

## 2018-12-16 NOTE — PT/OT/SLP EVAL
Physical Therapy Evaluation    Patient Name:  Shaan Brown   MRN:  [id number]    Recommendations:     Discharge Recommendations:  (Home  with possible HH PT pending progress)   Discharge Equipment Recommendations: none   Barriers to discharge: None    Assessment:     Shaan Brown is a 63 y.o. male admitted with a medical diagnosis of Syncope and collapse.  He presents with the following impairments/functional limitations:  impaired endurance, impaired self care skills, impaired functional mobilty, gait instability, impaired balance, impaired cardiopulmonary response to activity. Pt performed supine<>sit with SBA, sit<>stand transfer with CGAx  1 and gait 16 ft in room with O2 at 5 L via NC without AD requiring CGAx  1. Pt exhibits increased cecile and min lat sway during gait and may require a rw  at this time for safer gait pattern.      Rehab Prognosis: Good; patient would benefit from acute skilled PT services to address these deficits and reach maximum level of function.    Recent Surgery: * No surgery found *      Plan:     During this hospitalization, patient to be seen 6 x/week to address the identified rehab impairments via gait training, therapeutic activities, therapeutic exercises and progress toward the following goals:    GOALS:   Multidisciplinary Problems     Physical Therapy Goals        Problem: Physical Therapy Goal    Goal Priority Disciplines Outcome Goal Variances Interventions   Physical Therapy Goal     PT, PT/OT Ongoing (interventions implemented as appropriate)     Description:  Goals to be met by: 18     Patient will increase functional independence with mobility by performin. Supine <> sit with [place]  2. Sit to stand transfer with [place] and Modified [place]  3. Bed to chair transfer with [place] and Modified [place]   4. Gait  x 100 feet with Modified [place] and Supervision with or without AD.                       · Plan of Care Expires:   "01/15/19    Subjective     Chief Complaint: " I am going to keep blacking out because I am not getting enough oxygen. It will keep happening."  Patient/Family Comments/goals: to return home  Pain/Comfort:  · Pain Rating 1: 0/10  · Pain Rating Post-Intervention 1: 0/10    Patients cultural, spiritual, Worship conflicts given the current situation: no    Living Environment:  Pt lives in a trailer with wife, 3 sets of 5 steps with B railing to enter, walk in shower.  Prior to admission, patients level of function was independent with ADLS and ambulated without AD.  Equipment used at home: shower chair, raised toilet, oxygen, walker, rolling, rollator.  DME owned (not currently used): single point cane and crutches.  Upon discharge, patient will have assistance from wife.    Objective:     Communicated with RN prior to session.  Patient found supine with  telemetry, oxygen, peripheral IV  upon PT entry to room.    General Precautions: Standard, fall, respiratory   Orthopedic Precautions:N/A   Braces: N/A     Exams:  · Gross Motor Coordination:  WFL  · Postural Exam:  Patient presented with the following abnormalities:    · -       Rounded shoulders  · RLE ROM: WFL  · RLE Strength: 4+/5  · LLE ROM: WFL  · LLE Strength: 4+/5    Functional Mobility:  · Bed Mobility:     · Supine to Sit: stand by assistance  · Sit to Supine: stand by assistance  · Transfers:     · Sit to Stand:  contact guard assistance with no AD  · Gait: ambulated 16 ft without AD with CGAx  1 and O2 at 5 L via NC  · Balance: Static stand : F;  Dynamic stand:F      Therapeutic Activities and Exercises:   See th above for bed mobility, transfer and gait levels. Pt may exhibit safer gait pattern using a rw vs no AD until dynamic stand balance improves.    AM-PAC 6 CLICK MOBILITY  Total Score:19     Patient left supine with all lines intact, call button in reach, RN notified and  wife present.    GOALS:   Multidisciplinary Problems     Physical Therapy " Goals        Problem: Physical Therapy Goal    Goal Priority Disciplines Outcome Goal Variances Interventions   Physical Therapy Goal     PT, PT/OT Ongoing (interventions implemented as appropriate)     Description:  Goals to be met by: 18     Patient will increase functional independence with mobility by performin. Supine <> sit with [place]  2. Sit to stand transfer with [place] and Modified [place]  3. Bed to chair transfer with [place] and Modified [place]   4. Gait  x 100 feet with Modified [place] and Supervision with or without AD.                       History:     Past Medical History:   Diagnosis Date    Acid reflux     Angina, class III     Anticoagulant long-term use     Anxiety and depression     Atherosclerosis of lower extremity with claudication     Bilateral carotid artery stenosis     CAD (coronary artery disease)     CAD (coronary artery disease) of artery bypass graft     CHF (congestive heart failure)     Chronic hepatitis C     COPD (chronic obstructive pulmonary disease)     Diabetes mellitus     Hepatitis     HHD (hypertensive heart disease)     Hypercholesteremia     Hypertension     Hypoxia     Neuropathy, lower extremity     Obstructive sleep apnea     PAF (paroxysmal atrial fibrillation)     Presence of stent in artery     Requires continuous at home supplemental oxygen     Syncope     Syncope and collapse        Past Surgical History:   Procedure Laterality Date    choley      COLONOSCOPY      CORONARY ARTERY BYPASS GRAFT      x 4    Left heart cath Left 2018    Performed by Nicolás [facility]ak MD at [facility] CATH    LEFT HEART CATHETERIZATION Left 2018    Procedure: Left heart cath;  Surgeon: Nicolás Pathak MD;  Location: [facility] CATH;  Service: Cardiovascular;  Laterality: Left;    stent in artery      UPPER GASTROINTESTINAL ENDOSCOPY         Clinical Decision Making:     History  Co-morbidities and personal  factors that may impact the plan of care Examination  Body Structures and Functions, activity limitations and participation restrictions that may impact the plan of care Clinical Presentation   Decision Making/ Complexity Score   Co-morbidities:   [] Time since onset of injury / illness / exacerbation  [] Status of current condition  []Patient's cognitive status and safety concerns    [] Multiple Medical Problems (see med hx)  Personal Factors:   [] Patient's age  [] Prior Level of function   [] Patient's home situation (environment and family support)  [] Patient's level of motivation  [] Expected progression of patient      HISTORY:(criteria)    [] 40812 - no personal factors/history    [] 74575 - has 1-2 personal factor/comorbidity     [] 84555 - has >3 personal factor/comorbidity     Body Regions:  [] Objective examination findings  [] Head     []  Neck  [] Trunk   [] Upper Extremity  [] Lower Extremity    Body Systems:  [] For communication ability, affect, cognition, language, and learning style: the assessment of the ability to make needs known, consciousness, orientation (person, place, and time), expected emotional /behavioral responses, and learning preferences (eg, learning barriers, education  needs)  [] For the neuromuscular system: a general assessment of gross coordinated movement (eg, balance, gait, locomotion, transfers, and transitions) and motor function  (motor control and motor learning)  [] For the musculoskeletal system: the assessment of gross symmetry, gross range of motion, gross strength, height, and weight  [] For the integumentary system: the assessment of pliability(texture), presence of scar formation, skin color, and skin integrity  [] For cardiovascular/pulmonary system: the assessment of heart rate, respiratory rate, blood pressure, and edema     Activity limitations:    [] Patient's cognitive status and saf ety concerns          [] Status of current condition      [] Weight bearing  restriction  [] Cardiopulmunary Restriction    Participation Restrictions:   [] Goals and goal agreement with the patient     [] Rehab potential (prognosis) and probable outcome      Examination of Body System: (criteria)    [] 59691 - addressing 1-2 elements    [] 07754 - addressing a total of 3 or more elements     [] 96061 -  Addressing a total of 4 or more elements         Clinical Presentation: (criteria)  Choose one     On examination of body system using standardized tests and measures patient presents with (CHOOSE ONE) elements from any of the following: body structures and functions, activity limitations, and/or participation restrictions.  Leading to a clinical presentation that is considered (CHOOSE ONE)                              Clinical Decision Making  (Eval Complexity):  Choose One     Time Tracking:     PT Received On: 12/15/18  PT Start Time: 1358     PT Stop Time: 1414  PT Total Time (min): 16 min     Billable Minutes: Evaluation 16      Lissa Guy, PT  12/15/2018

## 2018-12-16 NOTE — PROGRESS NOTES
Progress Note  [facility]  Admit Date: 12/14/2018   LOS: 0 days   SUBJECTIVE:     Follow-up For: recurrent syncope with subsequent slurred speech    NAEON.  Tolerating diet and no chest pain or synocpe overnight. He did have a lower blood pressure this morning. Worked with PT yesterday. He wants to go home. Family is present. No other complaints. Discharging today. Neurology evaluated patient and they recommended cyanocobalamin for 6 months.      Review of Systems   Eyes: Negative for double vision.   negative for chest pain,   No N/V, no D/C, no F/C    OBJECTIVE:   Vital Signs (Most Recent)  Temp: 96.8 °F (36 °C) (12/16/18 0442)  Pulse: (!) 44 (12/16/18 0442)  Resp: 20 (12/16/18 0442)  BP: (!) 96/49 (12/16/18 0442)  SpO2: (!) 90 % (12/16/18 0306)    I & O (Last 24H):    Intake/Output Summary (Last 24 hours) at 12/16/2018 0751  Last data filed at 12/15/2018 2145  Gross per 24 hour   Intake 750 ml   Output 1900 ml   Net -1150 ml     Wt Readings from Last 3 Encounters:   12/14/18 127.2 kg (280 lb 6.8 oz)   12/14/18 132 kg (291 lb)   09/18/18 124.6 kg (274 lb 12.8 oz)       Current Diet Order   Procedures    Diet diabetic [facility]; 2000 Calorie; Coumadin Restriction; Thin     cardiac     Order Specific Question:   Indicate patient location for additional diet options:     Answer:   [facility]     Order Specific Question:   Total calories:     Answer:   2000 Calorie     Order Specific Question:   Additional Diet Options:     Answer:   Coumadin Restriction     Order Specific Question:   Fluid consistency:     Answer:   Thin        Physical Exam  General: well developed, NAD, on 5 liters NC  HEENT: normocephalic, atraumatic, hearing grossly normal bilaterally, mucous membranes moist, EOM intact, no scleral icterus  Neck: supple, symmetrical, trachea midline  Lungs:  clear to auscultation bilaterally and mild SOB with speaking,  Cardiovascular: greatly diminished heart sounds, displaced  PMI  Extremities: no cyanosis or edema, or clubbing, distal pulses faint  Abdomen: soft, non-tender to palpation, no distention, no masses/hernias  Skin: Skin color, texture, turgor normal. No rashes, multiple skin tags, moles  Musculoskeletal: no clubbing, cyanosis, no deformities  Neurologic: AAO X 4, No focal numbness or weakness CN II-XII intact, speech clear, finger-to-nose intact, gait deferred)  Psych/Behavioral:  Pleasant, appropriate affect.        Laboratory Data:  CBC  Recent Labs   Lab 12/14/18  2122 12/15/18  0537 12/16/18  0606   WBC 7.58 9.59 7.68   RBC 5.31 5.10 5.03   HGB 15.9 15.3 14.9   HCT 49.9 48.0 46.7   * 120* 116*   MCV 94 94 93   MCH 29.9 30.0 29.6   MCHC 31.9* 31.9* 31.9*     CMP  Recent Labs   Lab 12/14/18  2122 12/15/18  0537 12/16/18  0606   CALCIUM 9.3 9.0 9.0   PROT 7.4 7.1 6.9    140 139   K 4.4 4.1 3.9   CO2 25 30* 28    101 101   BUN 16 15 18   CREATININE 1.2 1.3 1.3   ALKPHOS 67 57 57   ALT 16 15 16   AST 20 19 18   BILITOT 1.4* 1.7* 1.7*     POCT-Glucose  POCT Glucose   Date Value Ref Range Status   12/16/2018 86 70 - 110 mg/dL Final   12/15/2018 147 (H) 70 - 110 mg/dL Final   12/15/2018 125 (H) 70 - 110 mg/dL Final   12/15/2018 116 (H) 70 - 110 mg/dL Final   12/15/2018 105 70 - 110 mg/dL Final     COAGS  Recent Labs   Lab 12/14/18  2122 12/15/18  0537   INR 2.1* 2.2*   APTT  --  33.6*     UA  No results for input(s): COLORU, CLARITYU, SPECGRAV, PHUR, PROTEINUA, GLUCOSEU, BLOODU, WBCU, RBCU, BACTERIA, MUCUS in the last 24 hours.    Invalid input(s):  BILIRUBINCON  MICRO  Microbiology Results (last 7 days)     Procedure Component Value Units Date/Time    Blood culture [[id number]] Collected:  12/14/18 2122    Order Status:  Completed Specimen:  Blood Updated:  12/16/18 0612     Blood Culture, Routine No Growth to date     Blood Culture, Routine No Growth to date    Blood culture [[id number]] Collected:  12/14/18 2129    Order Status:  Completed Specimen:  Blood  Updated:  12/16/18 0612     Blood Culture, Routine No Growth to date     Blood Culture, Routine No Growth to date    Urine culture [[id number]] Collected:  12/14/18 6573    Order Status:  Sent Specimen:  Urine, Clean Catch Updated:  12/15/18 0408        LIPID PANEL  Lab Results   Component Value Date    CHOL 102 (L) 12/14/2018     Lab Results   Component Value Date    HDL 20 (L) 12/14/2018     Lab Results   Component Value Date    LDLCALC 63.4 12/14/2018     Lab Results   Component Value Date    TRIG 93 12/14/2018     Lab Results   Component Value Date    CHOLHDL 19.6 (L) 12/14/2018     INR: 2.1  Trop: 0.224, trending  HgBA1C: 6.6      Diagnostic Results:  Imaging in last 24 hours:    CXR 12/14/18: No acute findings, enlarged cardiac silhouette  EKG: chronic ST depression    ASSESSMENT/PLAN:     Syncope Vs Stroke/TIA  Pt found down by wife with subsequent slurred speech, but no specific seizure/post-ictal findings.  Telestroke consulted and pt receiving work-up for Stroke/TIA.  No current focal deficits.  CT head neg, EKG with chronic changes, nothing acute   Troponin elevated but down trending and EKGs with ST depression similar to past  Bubble study last year negative in 2017   Neurology evaluated patient cyanocobalamin for 6 months     CAD/CABG  4 vessel CABG in pt's 40's and with balloon angioplasty in May 2018  On coumadin chronically, INR 2.2  Pt has chronic angina well-known to his cardiologist  Resuming coumadin after bedside swallow study  Also on lipitor 40 as home med, ASA 81- restarted as 2/2 stroke prevention as well  No chest pain      Acute on Chronic DiastolicCongestive Heart failure  Echo 7/17 with EF of 55% but indeterminate LV diastolic function  Repeating Echo f/u with PCP     COPD  On oxygen 4.5 liters in-patient (home 5.5 liters) duo-neb breathing treatments, tiotroprium, fluticasone-vilanterol     DMT2  HgbA1C 6.6 this admission  Resuming home NPH 15 BID before meals + SS  Accuchecks q AC &  HS     GERD  Protonix 40 mg BID      DVT ppx:  Resumed home coumadin   Code: Did discuss with patient.  He wishes to remain full code for now, with the understanding that he does not want to be on the vent for long term.  He will have his wife make the call if it looks like he wouldn't improve from any initial insult (did discuss poor prognosis at this time given his need for 5 1/2 liters at home)     Dispo: Discharging today     12/16/2018 Harrison Mendoza MD  8:21 AM

## 2018-12-16 NOTE — DISCHARGE INSTRUCTIONS
Syncope, Unk Cause (English) View Edit Remove   COPD, What is (English) View Edit Remove   Hepatitis C (HCV), Understanding (English) View Edit Remove   Diabetes and Heart Disease (English) View Edit Remove   Heart Failure: Making Changes to Your Diet (English) View Edit Remove   Heart Failure: Tracking Your Weight (English) View Edit Remove   Heart Failure: Being Active (English) View Edit Remove   Weight Management: Getting Started (English) View Edit Remove   Vitamin B-12 (English) View Edit Remove   Amiodarone tablets (English) View Edit Remove

## 2018-12-17 LAB — HIV 1+2 AB+HIV1 P24 AG SERPL QL IA: NEGATIVE

## 2018-12-20 LAB
BACTERIA BLD CULT: NORMAL
BACTERIA BLD CULT: NORMAL

## 2019-02-15 PROBLEM — J44.1 COPD EXACERBATION: Status: ACTIVE | Noted: 2019-02-15

## 2019-02-17 PROBLEM — R47.1 DYSARTHRIA AND ANARTHRIA: Status: RESOLVED | Noted: 2018-12-14 | Resolved: 2019-02-17

## 2019-02-17 PROBLEM — K59.00 CONSTIPATION: Status: RESOLVED | Noted: 2017-07-17 | Resolved: 2019-02-17

## 2019-02-17 PROBLEM — R78.81 POSITIVE BLOOD CULTURE: Status: RESOLVED | Noted: 2017-07-15 | Resolved: 2019-02-17

## 2019-02-17 PROBLEM — S22.49XA RIB FRACTURES: Status: RESOLVED | Noted: 2017-07-29 | Resolved: 2019-02-17

## 2019-02-18 PROBLEM — N18.30 STAGE 3 CHRONIC KIDNEY DISEASE: Status: ACTIVE | Noted: 2019-02-18

## 2019-02-19 PROBLEM — Z78.9 KNOWLEDGE DEFICIT ABOUT THERAPEUTIC DIET: Status: ACTIVE | Noted: 2019-02-19

## 2019-03-08 PROBLEM — J44.1 COPD EXACERBATION: Status: RESOLVED | Noted: 2019-02-15 | Resolved: 2019-03-08

## 2019-03-08 PROBLEM — R09.89 PULMONARY VASCULAR CONGESTION: Status: RESOLVED | Noted: 2017-05-08 | Resolved: 2019-03-08

## 2019-08-09 PROBLEM — N28.9 ACUTE RENAL INSUFFICIENCY: Status: ACTIVE | Noted: 2019-08-09

## 2019-08-26 NOTE — ASSESSMENT & PLAN NOTE
[employer] QHS.    Health Maintenance Due   Topic Date Due   • Medicare Wellness 65+  12/26/2016   • Shingles Vaccine (2 of 3) 06/22/2019   • Depression Screening  08/23/2019       Patient is due for topics listed above, she wishes to proceed with Depression Screening , but is not proceeding with Immunization(s) Shingles at this time. The following has occured: patient states she is up to date with shingle vaccine     Recent PHQ 2/9 Score    PHQ 2:  Date Adult PHQ 2 Score   8/26/2019 0       PHQ 9:

## 2019-08-30 PROBLEM — I48.92 ATRIAL FLUTTER: Status: ACTIVE | Noted: 2019-08-30

## 2019-08-31 PROBLEM — I50.33 ACUTE ON CHRONIC CONGESTIVE HEART FAILURE WITH RIGHT VENTRICULAR DIASTOLIC DYSFUNCTION: Status: ACTIVE | Noted: 2019-08-31

## 2019-08-31 PROBLEM — I27.21 PAH (PULMONARY ARTERY HYPERTENSION): Status: ACTIVE | Noted: 2019-08-31

## 2019-08-31 PROBLEM — I50.813 ACUTE ON CHRONIC RIGHT-SIDED CONGESTIVE HEART FAILURE: Status: ACTIVE | Noted: 2019-08-31

## 2019-08-31 PROBLEM — I50.82 ACUTE ON CHRONIC CONGESTIVE HEART FAILURE WITH RIGHT VENTRICULAR DIASTOLIC DYSFUNCTION: Status: ACTIVE | Noted: 2019-08-31

## 2019-09-05 PROBLEM — R63.8 ALTERATION IN NUTRITION: Status: ACTIVE | Noted: 2019-09-05

## 2019-09-09 PROBLEM — R63.8 ALTERATION IN NUTRITION: Status: RESOLVED | Noted: 2019-09-05 | Resolved: 2019-09-09

## 2019-11-08 PROBLEM — I50.9 CONGESTIVE HEART FAILURE: Status: ACTIVE | Noted: 2019-11-08

## 2020-01-01 ENCOUNTER — LAB VISIT (OUTPATIENT)
Dept: LAB | Facility: HOSPITAL | Age: 65
End: 2020-01-01
Attending: INTERNAL MEDICINE
Payer: MEDICARE

## 2020-01-01 ENCOUNTER — HOSPITAL ENCOUNTER (EMERGENCY)
Facility: HOSPITAL | Age: 65
Discharge: LEFT BEFORE DISCHARGE | End: 2020-11-02
Attending: EMERGENCY MEDICINE
Payer: MEDICARE

## 2020-01-01 ENCOUNTER — HISTORICAL (OUTPATIENT)
Dept: ADMINISTRATIVE | Facility: HOSPITAL | Age: 65
End: 2020-01-01

## 2020-01-01 ENCOUNTER — LAB VISIT (OUTPATIENT)
Dept: LAB | Facility: HOSPITAL | Age: 65
End: 2020-01-01
Attending: ORTHOPAEDIC SURGERY
Payer: MEDICARE

## 2020-01-01 VITALS
BODY MASS INDEX: 39.62 KG/M2 | HEIGHT: 71 IN | HEART RATE: 126 BPM | DIASTOLIC BLOOD PRESSURE: 80 MMHG | OXYGEN SATURATION: 83 % | RESPIRATION RATE: 18 BRPM | TEMPERATURE: 98 F | WEIGHT: 283 LBS | SYSTOLIC BLOOD PRESSURE: 144 MMHG

## 2020-01-01 DIAGNOSIS — I50.43 ACUTE ON CHRONIC COMBINED SYSTOLIC AND DIASTOLIC HEART FAILURE: ICD-10-CM

## 2020-01-01 DIAGNOSIS — N18.6 TYPE 2 DIABETES MELLITUS WITH END-STAGE RENAL DISEASE: ICD-10-CM

## 2020-01-01 DIAGNOSIS — E11.22 TYPE 2 DIABETES MELLITUS WITH END-STAGE RENAL DISEASE: ICD-10-CM

## 2020-01-01 DIAGNOSIS — R55 SYNCOPE AND COLLAPSE: Primary | ICD-10-CM

## 2020-01-01 DIAGNOSIS — I13.0 HYPERTENSIVE HEART AND RENAL DISEASE WITH CONGESTIVE HEART FAILURE: Primary | ICD-10-CM

## 2020-01-01 DIAGNOSIS — I50.812 CHRONIC RIGHT-SIDED HEART FAILURE: ICD-10-CM

## 2020-01-01 DIAGNOSIS — E11.21 DIABETIC GLOMERULOPATHY: ICD-10-CM

## 2020-01-01 DIAGNOSIS — N18.30 CHRONIC KIDNEY DISEASE, STAGE III (MODERATE): ICD-10-CM

## 2020-01-01 DIAGNOSIS — I31.0: Primary | ICD-10-CM

## 2020-01-01 DIAGNOSIS — R00.2 PALPITATIONS: ICD-10-CM

## 2020-01-01 DIAGNOSIS — J96.20: ICD-10-CM

## 2020-01-01 LAB
ALBUMIN SERPL BCP-MCNC: 3.3 G/DL (ref 3.5–5.2)
ALBUMIN SERPL BCP-MCNC: 3.4 G/DL (ref 3.5–5)
ALBUMIN SERPL BCP-MCNC: 3.4 G/DL (ref 3.5–5.2)
ALBUMIN SERPL BCP-MCNC: 3.5 G/DL (ref 3.5–5.2)
ALBUMIN SERPL BCP-MCNC: 3.6 G/DL (ref 3.5–5)
ALBUMIN/GLOB SERPL ELPH: 0.8 {RATIO} (ref 1.5–2.2)
ALBUMIN/GLOB SERPL ELPH: 0.9 {RATIO} (ref 1.5–2.2)
ALP SERPL-CCNC: 70 U/L (ref 55–135)
ALP SERPL-CCNC: 80 U/L (ref 55–135)
ALP SERPL-CCNC: 80 U/L (ref 55–135)
ALP SERPL-CCNC: 81 U/L (ref 55–135)
ALP SERPL-CCNC: 82 U/L (ref 50–136)
ALP SERPL-CCNC: 84 U/L (ref 55–135)
ALP SERPL-CCNC: 88 U/L (ref 50–136)
ALP SERPL-CCNC: 95 U/L (ref 55–135)
ALP SERPL-CCNC: 95 U/L (ref 55–135)
ALT SERPL W P-5'-P-CCNC: 29 U/L (ref 16–61)
ALT SERPL W P-5'-P-CCNC: 34 U/L (ref 16–61)
ALT SERPL W/O P-5'-P-CCNC: 26 U/L (ref 10–44)
ALT SERPL W/O P-5'-P-CCNC: 30 U/L (ref 10–44)
ALT SERPL W/O P-5'-P-CCNC: 31 U/L (ref 10–44)
ALT SERPL W/O P-5'-P-CCNC: 31 U/L (ref 10–44)
ALT SERPL W/O P-5'-P-CCNC: 33 U/L (ref 10–44)
ALT SERPL W/O P-5'-P-CCNC: 36 U/L (ref 10–44)
ALT SERPL W/O P-5'-P-CCNC: 37 U/L (ref 10–44)
ANION GAP SERPL CALC-SCNC: 10.7 MEQ/L (ref 10–20)
ANION GAP SERPL CALC-SCNC: 11.4 MEQ/L (ref 10–20)
ANION GAP SERPL CALC-SCNC: 3 MMOL/L (ref 8–16)
ANION GAP SERPL CALC-SCNC: 4 MMOL/L (ref 8–16)
ANION GAP SERPL CALC-SCNC: 6 MMOL/L (ref 8–16)
ANION GAP SERPL CALC-SCNC: 6 MMOL/L (ref 8–16)
ANION GAP SERPL CALC-SCNC: 7 MMOL/L (ref 8–16)
ANION GAP SERPL CALC-SCNC: 7 MMOL/L (ref 8–16)
ANION GAP SERPL CALC-SCNC: 8 MMOL/L (ref 8–16)
ANION GAP SERPL CALC-SCNC: 8 MMOL/L (ref 8–16)
ANION GAP SERPL CALC-SCNC: 8.3 MEQ/L (ref 10–20)
ANION GAP SERPL CALC-SCNC: 9.8 MEQ/L (ref 10–20)
AST SERPL-CCNC: 22 U/L (ref 10–40)
AST SERPL-CCNC: 22 U/L (ref 15–37)
AST SERPL-CCNC: 23 U/L (ref 10–40)
AST SERPL-CCNC: 23 U/L (ref 10–40)
AST SERPL-CCNC: 23 U/L (ref 15–37)
AST SERPL-CCNC: 27 U/L (ref 10–40)
AST SERPL-CCNC: 31 U/L (ref 10–40)
AST SERPL-CCNC: 33 U/L (ref 10–40)
AST SERPL-CCNC: 36 U/L (ref 10–40)
BASOPHILS # BLD AUTO: 0.04 K/UL (ref 0–0.2)
BASOPHILS # BLD AUTO: 0.05 K/UL (ref 0–0.2)
BASOPHILS # BLD AUTO: 0.06 K/UL (ref 0–0.2)
BASOPHILS NFR BLD: 0.1 10 (ref 0–0.1)
BASOPHILS NFR BLD: 0.1 10 (ref 0–0.1)
BASOPHILS NFR BLD: 0.5 % (ref 0–1.5)
BASOPHILS NFR BLD: 0.5 % (ref 0–1.9)
BASOPHILS NFR BLD: 0.5 % (ref 0–1.9)
BASOPHILS NFR BLD: 0.6 % (ref 0–1.5)
BASOPHILS NFR BLD: 0.6 % (ref 0–1.9)
BASOPHILS NFR BLD: 0.7 % (ref 0–1.9)
BILIRUB SERPL-MCNC: 1.3 MG/DL (ref 0.1–1)
BILIRUB SERPL-MCNC: 1.3 MG/DL (ref 0.1–1)
BILIRUB SERPL-MCNC: 1.5 MG/DL (ref 0.1–1)
BILIRUB SERPL-MCNC: 1.6 MG/DL (ref 0.1–1)
BILIRUB SERPL-MCNC: 1.7 MG/DL (ref 0.1–1)
BILIRUB SERPL-MCNC: 1.7 MG/DL (ref 0.1–1)
BILIRUB SERPL-MCNC: 1.75 MG/DL (ref 0.2–1)
BILIRUB SERPL-MCNC: 1.8 MG/DL (ref 0.1–1)
BILIRUB SERPL-MCNC: 1.86 MG/DL (ref 0.2–1)
BNP SERPL-MCNC: 193 PG/ML (ref 0–228)
BNP SERPL-MCNC: 242 PG/ML (ref 0–228)
BUN SERPL-MCNC: 14 MG/DL (ref 8–23)
BUN SERPL-MCNC: 19 MG/DL (ref 7–18)
BUN SERPL-MCNC: 19 MG/DL (ref 8–23)
BUN SERPL-MCNC: 20 MG/DL (ref 8–23)
BUN SERPL-MCNC: 20 MG/DL (ref 8–23)
BUN SERPL-MCNC: 21 MG/DL (ref 7–18)
BUN SERPL-MCNC: 21 MG/DL (ref 8–23)
BUN SERPL-MCNC: 21 MG/DL (ref 8–23)
BUN SERPL-MCNC: 23 MG/DL (ref 8–23)
BUN SERPL-MCNC: 23 MG/DL (ref 8–23)
BUN SERPL-MCNC: 27 MG/DL (ref 8–23)
BUN SERPL-MCNC: 30 MG/DL (ref 8–23)
BUN SERPL-MCNC: 33 MG/DL (ref 7–18)
BUN SERPL-MCNC: 33 MG/DL (ref 7–18)
CALCIUM SERPL-MCNC: 8.2 MG/DL (ref 8.7–10.5)
CALCIUM SERPL-MCNC: 8.3 MG/DL (ref 8.7–10.5)
CALCIUM SERPL-MCNC: 8.4 MG/DL (ref 8.5–10.1)
CALCIUM SERPL-MCNC: 8.4 MG/DL (ref 8.7–10.5)
CALCIUM SERPL-MCNC: 8.5 MG/DL (ref 8.7–10.5)
CALCIUM SERPL-MCNC: 8.5 MG/DL (ref 8.7–10.5)
CALCIUM SERPL-MCNC: 8.6 MG/DL (ref 8.5–10.1)
CALCIUM SERPL-MCNC: 8.6 MG/DL (ref 8.7–10.5)
CALCIUM SERPL-MCNC: 8.6 MG/DL (ref 8.7–10.5)
CALCIUM SERPL-MCNC: 8.7 MG/DL (ref 8.7–10.5)
CALCIUM SERPL-MCNC: 9 MG/DL (ref 8.7–10.5)
CALCIUM SERPL-MCNC: 9.1 MG/DL (ref 8.7–10.5)
CALCIUM SERPL-MCNC: 9.4 MG/DL (ref 8.5–10.1)
CALCIUM SERPL-MCNC: 9.8 MG/DL (ref 8.5–10.1)
CHLORIDE SERPL-SCNC: 102 MMOL/L (ref 95–110)
CHLORIDE SERPL-SCNC: 103 MMOL/L (ref 95–110)
CHLORIDE SERPL-SCNC: 104 MMOL/L (ref 95–110)
CHLORIDE SERPL-SCNC: 104 MMOL/L (ref 95–110)
CHLORIDE SERPL-SCNC: 104 MMOL/L (ref 98–107)
CHLORIDE SERPL-SCNC: 105 MMOL/L (ref 95–110)
CHLORIDE SERPL-SCNC: 105 MMOL/L (ref 95–110)
CHLORIDE SERPL-SCNC: 106 MMOL/L (ref 95–110)
CHLORIDE SERPL-SCNC: 106 MMOL/L (ref 98–107)
CHLORIDE SERPL-SCNC: 93 MMOL/L (ref 98–107)
CHLORIDE SERPL-SCNC: 95 MMOL/L (ref 98–107)
CHLORIDE SERPL-SCNC: 96 MMOL/L (ref 95–110)
CHLORIDE SERPL-SCNC: 98 MMOL/L (ref 95–110)
CHLORIDE SERPL-SCNC: 99 MMOL/L (ref 95–110)
CO2 SERPL-SCNC: 27 MMOL/L (ref 23–29)
CO2 SERPL-SCNC: 29 MMOL/L (ref 22–32)
CO2 SERPL-SCNC: 30 MMOL/L (ref 22–32)
CO2 SERPL-SCNC: 30 MMOL/L (ref 23–29)
CO2 SERPL-SCNC: 31 MMOL/L (ref 23–29)
CO2 SERPL-SCNC: 32 MMOL/L (ref 23–29)
CO2 SERPL-SCNC: 33 MMOL/L (ref 23–29)
CO2 SERPL-SCNC: 36 MMOL/L (ref 22–32)
CO2 SERPL-SCNC: 36 MMOL/L (ref 22–32)
CREAT SERPL-MCNC: 1.14 MG/DL (ref 0.7–1.3)
CREAT SERPL-MCNC: 1.24 MG/DL (ref 0.7–1.3)
CREAT SERPL-MCNC: 1.3 MG/DL (ref 0.5–1.4)
CREAT SERPL-MCNC: 1.4 MG/DL (ref 0.5–1.4)
CREAT SERPL-MCNC: 1.5 MG/DL (ref 0.5–1.4)
CREAT SERPL-MCNC: 1.52 MG/DL (ref 0.7–1.3)
CREAT SERPL-MCNC: 1.59 MG/DL (ref 0.7–1.3)
CREAT SERPL-MCNC: 1.6 MG/DL (ref 0.5–1.4)
DIFFERENTIAL METHOD: ABNORMAL
EGFR: 47 ML/MIN/1.73M
EGFR: 49 ML/MIN/1.73M
EGFR: 62 ML/MIN/1.73M
EGFR: 68 ML/MIN/1.73M
EOSINOPHIL # BLD AUTO: 0 K/UL (ref 0–0.5)
EOSINOPHIL # BLD AUTO: 0.1 K/UL (ref 0–0.5)
EOSINOPHIL NFR BLD: 0.1 10 (ref 0–0.7)
EOSINOPHIL NFR BLD: 0.1 10 (ref 0–0.7)
EOSINOPHIL NFR BLD: 0.4 % (ref 0–8)
EOSINOPHIL NFR BLD: 0.5 % (ref 0–8)
EOSINOPHIL NFR BLD: 0.8 % (ref 0–7)
EOSINOPHIL NFR BLD: 0.8 % (ref 0–7)
EOSINOPHIL NFR BLD: 1 % (ref 0–8)
EOSINOPHIL NFR BLD: 1.1 % (ref 0–8)
EOSINOPHIL NFR BLD: 1.3 % (ref 0–8)
ERYTHROCYTE [DISTWIDTH] IN BLOOD BY AUTOMATED COUNT: 16.2 % (ref 11.5–14.5)
ERYTHROCYTE [DISTWIDTH] IN BLOOD BY AUTOMATED COUNT: 16.4 % (ref 11.5–14.5)
ERYTHROCYTE [DISTWIDTH] IN BLOOD BY AUTOMATED COUNT: 16.8 % (ref 11.5–14.5)
ERYTHROCYTE [DISTWIDTH] IN BLOOD BY AUTOMATED COUNT: 16.9 % (ref 11.5–14.5)
ERYTHROCYTE [DISTWIDTH] IN BLOOD BY AUTOMATED COUNT: 17.1 % (ref 11.5–14.5)
ERYTHROCYTE [DISTWIDTH] IN BLOOD BY AUTOMATED COUNT: 17.3 % (ref 11.5–14.5)
ERYTHROCYTE [DISTWIDTH] IN BLOOD BY AUTOMATED COUNT: 17.5 % (ref 11.5–14.5)
ERYTHROCYTE [DISTWIDTH] IN BLOOD BY AUTOMATED COUNT: 17.6 % (ref 11.5–14.5)
ERYTHROCYTE [DISTWIDTH] IN BLOOD BY AUTOMATED COUNT: 17.6 % (ref 11.5–14.5)
EST. GFR  (AFRICAN AMERICAN): 51.1 ML/MIN/1.73 M^2
EST. GFR  (AFRICAN AMERICAN): 55.3 ML/MIN/1.73 M^2
EST. GFR  (AFRICAN AMERICAN): >60 ML/MIN/1.73 M^2
EST. GFR  (NON AFRICAN AMERICAN): 44.2 ML/MIN/1.73 M^2
EST. GFR  (NON AFRICAN AMERICAN): 47.8 ML/MIN/1.73 M^2
EST. GFR  (NON AFRICAN AMERICAN): 52 ML/MIN/1.73 M^2
EST. GFR  (NON AFRICAN AMERICAN): 56.9 ML/MIN/1.73 M^2
GLOBULIN: 4 G/DL (ref 2.3–3.5)
GLOBULIN: 4.4 G/DL (ref 2.3–3.5)
GLUCOSE SERPL-MCNC: 110 MG/DL (ref 70–110)
GLUCOSE SERPL-MCNC: 132 MG/DL (ref 70–110)
GLUCOSE SERPL-MCNC: 138 MG/DL (ref 70–110)
GLUCOSE SERPL-MCNC: 169 MG/DL (ref 70–110)
GLUCOSE SERPL-MCNC: 183 MG/DL (ref 70–110)
GLUCOSE SERPL-MCNC: 184 MG/DL (ref 70–99)
GLUCOSE SERPL-MCNC: 185 MG/DL (ref 70–110)
GLUCOSE SERPL-MCNC: 189 MG/DL (ref 70–99)
GLUCOSE SERPL-MCNC: 198 MG/DL (ref 70–110)
GLUCOSE SERPL-MCNC: 212 MG/DL (ref 70–110)
GLUCOSE SERPL-MCNC: 218 MG/DL (ref 70–99)
GLUCOSE SERPL-MCNC: 228 MG/DL (ref 70–99)
GLUCOSE SERPL-MCNC: 241 MG/DL (ref 70–110)
GLUCOSE SERPL-MCNC: 292 MG/DL (ref 70–110)
GRAN #: 6.86 10 (ref 2–7.5)
GRAN #: 7.03 10 (ref 2–7.5)
GRAN%: 0.4 %
GRAN%: 0.4 %
GRAN%: 69 % (ref 50–80)
GRAN%: 70.3 % (ref 50–80)
HCT VFR BLD AUTO: 46.9 % (ref 40–54)
HCT VFR BLD AUTO: 47.9 % (ref 40–54)
HCT VFR BLD AUTO: 48.2 % (ref 43.5–53.7)
HCT VFR BLD AUTO: 49.1 % (ref 40–54)
HCT VFR BLD AUTO: 49.2 % (ref 40–54)
HCT VFR BLD AUTO: 49.3 % (ref 40–54)
HCT VFR BLD AUTO: 51.1 % (ref 40–54)
HCT VFR BLD AUTO: 51.2 % (ref 40–54)
HCT VFR BLD AUTO: 53.7 % (ref 43.5–53.7)
HGB BLD-MCNC: 14.6 G/DL (ref 14–18)
HGB BLD-MCNC: 15 G/DL (ref 14–18)
HGB BLD-MCNC: 15.1 G/DL (ref 14–18)
HGB BLD-MCNC: 15.5 G/DL (ref 14–18)
HGB BLD-MCNC: 15.7 G/DL (ref 14.1–18.1)
HGB BLD-MCNC: 16.1 G/DL (ref 14–18)
HGB BLD-MCNC: 16.4 G/DL (ref 14–18)
HGB BLD-MCNC: 16.8 G/DL (ref 14–18)
HGB BLD-MCNC: 17.4 G/DL (ref 14.1–18.1)
IMM GRANULOCYTES # BLD AUTO: 0.03 K/UL (ref 0–0.04)
IMM GRANULOCYTES # BLD AUTO: 0.04 K/UL (ref 0–0.04)
IMM GRANULOCYTES # BLD AUTO: 0.04 K/UL (ref 0–0.04)
IMM GRANULOCYTES # BLD AUTO: 0.05 K/UL (ref 0–0.04)
IMM GRANULOCYTES # BLD AUTO: 0.05 K/UL (ref 0–0.04)
IMM GRANULOCYTES NFR BLD AUTO: 0.3 % (ref 0–0.5)
IMM GRANULOCYTES NFR BLD AUTO: 0.4 % (ref 0–0.5)
IMM GRANULOCYTES NFR BLD AUTO: 0.5 % (ref 0–0.5)
IMM GRANULOCYTES NFR BLD AUTO: 0.5 % (ref 0–0.5)
IMM GRANULOCYTES NFR BLD AUTO: 0.6 % (ref 0–0.5)
IMMATURE GRANULOCYTES #: 0.04 10
IMMATURE GRANULOCYTES #: 0.04 10
LYMPH #: 2 10 (ref 1–3.5)
LYMPH #: 2.2 10 (ref 1–3.5)
LYMPH%: 20.4 % (ref 12–50)
LYMPH%: 21.5 % (ref 12–50)
LYMPHOCYTES # BLD AUTO: 1.1 K/UL (ref 1–4.8)
LYMPHOCYTES # BLD AUTO: 1.2 K/UL (ref 1–4.8)
LYMPHOCYTES # BLD AUTO: 1.5 K/UL (ref 1–4.8)
LYMPHOCYTES # BLD AUTO: 1.6 K/UL (ref 1–4.8)
LYMPHOCYTES # BLD AUTO: 1.7 K/UL (ref 1–4.8)
LYMPHOCYTES # BLD AUTO: 1.7 K/UL (ref 1–4.8)
LYMPHOCYTES # BLD AUTO: 2.1 K/UL (ref 1–4.8)
LYMPHOCYTES NFR BLD: 11.6 % (ref 18–48)
LYMPHOCYTES NFR BLD: 12.2 % (ref 18–48)
LYMPHOCYTES NFR BLD: 17 % (ref 18–48)
LYMPHOCYTES NFR BLD: 17.4 % (ref 18–48)
LYMPHOCYTES NFR BLD: 18.4 % (ref 18–48)
LYMPHOCYTES NFR BLD: 19.6 % (ref 18–48)
LYMPHOCYTES NFR BLD: 24.9 % (ref 18–48)
Lab: 0.23 NG/ML (ref 0.8–2)
MAGNESIUM SERPL-MCNC: 1.9 MG/DL (ref 1.6–2.6)
MAGNESIUM SERPL-MCNC: 2.05 MG/DL (ref 1.8–2.4)
MAGNESIUM SERPL-MCNC: 2.1 MG/DL (ref 1.6–2.6)
MAGNESIUM SERPL-MCNC: 2.1 MG/DL (ref 1.6–2.6)
MAGNESIUM SERPL-MCNC: 2.12 MG/DL (ref 1.8–2.4)
MAGNESIUM SERPL-MCNC: 2.14 MG/DL (ref 1.8–2.4)
MAGNESIUM SERPL-MCNC: 2.3 MG/DL (ref 1.6–2.6)
MAGNESIUM SERPL-MCNC: 2.3 MG/DL (ref 1.6–2.6)
MAGNESIUM SERPL-MCNC: 2.36 MG/DL (ref 1.8–2.4)
MAGNESIUM SERPL-MCNC: 2.4 MG/DL (ref 1.6–2.6)
MAGNESIUM SERPL-MCNC: 2.5 MG/DL (ref 1.6–2.6)
MCH RBC QN AUTO: 27.9 PG (ref 27–31)
MCH RBC QN AUTO: 28.2 PG (ref 27–31)
MCH RBC QN AUTO: 28.5 PG (ref 27–31)
MCH RBC QN AUTO: 28.6 PG (ref 27–31)
MCH RBC QN AUTO: 28.7 PG (ref 27–31)
MCH RBC QN AUTO: 28.9 PG (ref 27–31)
MCH RBC QN AUTO: 29.2 PG (ref 27–31)
MCH RBC QN AUTO: 29.2 PG (ref 27–31)
MCH RBC QN AUTO: 29.6 PG (ref 27–31)
MCHC RBC AUTO-ENTMCNC: 30.8 G/DL (ref 32–36)
MCHC RBC AUTO-ENTMCNC: 31.1 G/DL (ref 32–36)
MCHC RBC AUTO-ENTMCNC: 31.3 G/DL (ref 32–36)
MCHC RBC AUTO-ENTMCNC: 31.5 G/DL (ref 32–36)
MCHC RBC AUTO-ENTMCNC: 32 G/DL (ref 32–36)
MCHC RBC AUTO-ENTMCNC: 32.4 G% (ref 32–35)
MCHC RBC AUTO-ENTMCNC: 32.6 G% (ref 32–35)
MCHC RBC AUTO-ENTMCNC: 32.7 G/DL (ref 32–36)
MCHC RBC AUTO-ENTMCNC: 32.9 G/DL (ref 32–36)
MCV RBC AUTO: 89 FL (ref 82–98)
MCV RBC AUTO: 89.1 FL (ref 80–97)
MCV RBC AUTO: 89.6 FL (ref 80–97)
MCV RBC AUTO: 90 FL (ref 82–98)
MCV RBC AUTO: 91 FL (ref 82–98)
MCV RBC AUTO: 92 FL (ref 82–98)
MCV RBC AUTO: 92 FL (ref 82–98)
MONO #: 0.7 10 (ref 0–0.8)
MONO #: 0.9 10 (ref 0–0.8)
MONO%: 6.5 % (ref 0–12)
MONO%: 8.8 % (ref 0–12)
MONOCYTES # BLD AUTO: 0.5 K/UL (ref 0.3–1)
MONOCYTES # BLD AUTO: 0.6 K/UL (ref 0.3–1)
MONOCYTES # BLD AUTO: 0.7 K/UL (ref 0.3–1)
MONOCYTES # BLD AUTO: 0.8 K/UL (ref 0.3–1)
MONOCYTES NFR BLD: 5.1 % (ref 4–15)
MONOCYTES NFR BLD: 6.6 % (ref 4–15)
MONOCYTES NFR BLD: 6.9 % (ref 4–15)
MONOCYTES NFR BLD: 7.1 % (ref 4–15)
MONOCYTES NFR BLD: 8.3 % (ref 4–15)
MONOCYTES NFR BLD: 8.3 % (ref 4–15)
MONOCYTES NFR BLD: 9 % (ref 4–15)
NEUTROPHILS # BLD AUTO: 5.4 K/UL (ref 1.8–7.7)
NEUTROPHILS # BLD AUTO: 5.5 K/UL (ref 1.8–7.7)
NEUTROPHILS # BLD AUTO: 6.4 K/UL (ref 1.8–7.7)
NEUTROPHILS # BLD AUTO: 6.5 K/UL (ref 1.8–7.7)
NEUTROPHILS # BLD AUTO: 7.4 K/UL (ref 1.8–7.7)
NEUTROPHILS # BLD AUTO: 7.5 K/UL (ref 1.8–7.7)
NEUTROPHILS # BLD AUTO: 7.5 K/UL (ref 1.8–7.7)
NEUTROPHILS NFR BLD: 64.6 % (ref 38–73)
NEUTROPHILS NFR BLD: 69.3 % (ref 38–73)
NEUTROPHILS NFR BLD: 71.5 % (ref 38–73)
NEUTROPHILS NFR BLD: 73.5 % (ref 38–73)
NEUTROPHILS NFR BLD: 74.1 % (ref 38–73)
NEUTROPHILS NFR BLD: 79.5 % (ref 38–73)
NEUTROPHILS NFR BLD: 81.8 % (ref 38–73)
NRBC BLD-RTO: 0 /100 WBC
NT-PROBNP: 1005 PG/ML (ref 5–900)
NT-PROBNP: 1030 PG/ML (ref 5–900)
NT-PROBNP: 456 PG/ML (ref 5–900)
NT-PROBNP: 478 PG/ML (ref 5–900)
NT-PROBNP: 696 PG/ML (ref 5–900)
NT-PROBNP: 736 PG/ML (ref 5–900)
OSMOC: 286 MOSM/KG (ref 275–295)
OSMOC: 287 MOSM/KG (ref 275–295)
OSMOC: 290 MOSM/KG (ref 275–295)
OSMOC: 290 MOSM/KG (ref 275–295)
PLATELET # BLD AUTO: 161 K/UL (ref 150–350)
PLATELET # BLD AUTO: 170 K/UL (ref 150–350)
PLATELET # BLD AUTO: 171 K/UL (ref 150–350)
PLATELET # BLD AUTO: 179 K/UL (ref 150–350)
PLATELET # BLD AUTO: 181 K/UL (ref 150–350)
PLATELET # BLD AUTO: 194 K/UL (ref 150–350)
PLATELET # BLD AUTO: 197 K/UL (ref 150–350)
PMV BLD AUTO: 10.1 FL (ref 9.2–12.9)
PMV BLD AUTO: 10.1 FL (ref 9.2–12.9)
PMV BLD AUTO: 10.3 FL (ref 7.4–10.4)
PMV BLD AUTO: 10.5 FL (ref 7.4–10.4)
PMV BLD AUTO: 10.7 FL (ref 9.2–12.9)
PMV BLD AUTO: 10.8 FL (ref 9.2–12.9)
PMV BLD AUTO: 10.8 FL (ref 9.2–12.9)
PMV BLD AUTO: 11 FL (ref 9.2–12.9)
PMV BLD AUTO: 160 10 (ref 142–424)
PMV BLD AUTO: 177 10 (ref 142–424)
PMV BLD AUTO: 9.9 FL (ref 9.2–12.9)
POTASSIUM SERPL-SCNC: 2.7 MMOL/L (ref 3.5–5.1)
POTASSIUM SERPL-SCNC: 3 MMOL/L (ref 3.5–5.1)
POTASSIUM SERPL-SCNC: 3.3 MMOL/L (ref 3.5–5.1)
POTASSIUM SERPL-SCNC: 3.4 MMOL/L (ref 3.5–5.1)
POTASSIUM SERPL-SCNC: 3.4 MMOL/L (ref 3.5–5.1)
POTASSIUM SERPL-SCNC: 3.5 MMOL/L (ref 3.5–5.1)
POTASSIUM SERPL-SCNC: 3.6 MMOL/L (ref 3.5–5.1)
POTASSIUM SERPL-SCNC: 3.8 MMOL/L (ref 3.5–5.1)
POTASSIUM SERPL-SCNC: 3.9 MMOL/L (ref 3.5–5.1)
POTASSIUM SERPL-SCNC: 4.2 MMOL/L (ref 3.5–5.1)
POTASSIUM SERPL-SCNC: 4.3 MMOL/L (ref 3.5–5.1)
POTASSIUM SERPL-SCNC: 4.3 MMOL/L (ref 3.5–5.1)
PROT SERPL-MCNC: 7.4 G/DL (ref 6.4–8.2)
PROT SERPL-MCNC: 7.4 G/DL (ref 6–8.4)
PROT SERPL-MCNC: 7.5 G/DL (ref 6–8.4)
PROT SERPL-MCNC: 7.6 G/DL (ref 6–8.4)
PROT SERPL-MCNC: 7.6 G/DL (ref 6–8.4)
PROT SERPL-MCNC: 7.7 G/DL (ref 6–8.4)
PROT SERPL-MCNC: 8 G/DL (ref 6.4–8.2)
PROT SERPL-MCNC: 8 G/DL (ref 6–8.4)
PROT SERPL-MCNC: 8.2 G/DL (ref 6–8.4)
RBC # BLD AUTO: 5.11 M/UL (ref 4.6–6.2)
RBC # BLD AUTO: 5.36 M/UL (ref 4.6–6.2)
RBC # BLD AUTO: 5.37 M/UL (ref 4.6–6.2)
RBC # BLD AUTO: 5.38 M/UL (ref 4.69–6.13)
RBC # BLD AUTO: 5.4 M/UL (ref 4.6–6.2)
RBC # BLD AUTO: 5.52 M/UL (ref 4.6–6.2)
RBC # BLD AUTO: 5.68 M/UL (ref 4.6–6.2)
RBC # BLD AUTO: 5.75 M/UL (ref 4.6–6.2)
RBC # BLD AUTO: 6.03 M/UL (ref 4.69–6.13)
SODIUM BLD-SCNC: 137 MMOL/L (ref 136–145)
SODIUM BLD-SCNC: 139 MMOL/L (ref 136–145)
SODIUM BLD-SCNC: 139 MMOL/L (ref 136–145)
SODIUM BLD-SCNC: 140 MMOL/L (ref 136–145)
SODIUM SERPL-SCNC: 136 MMOL/L (ref 136–145)
SODIUM SERPL-SCNC: 136 MMOL/L (ref 136–145)
SODIUM SERPL-SCNC: 137 MMOL/L (ref 136–145)
SODIUM SERPL-SCNC: 137 MMOL/L (ref 136–145)
SODIUM SERPL-SCNC: 139 MMOL/L (ref 136–145)
SODIUM SERPL-SCNC: 139 MMOL/L (ref 136–145)
SODIUM SERPL-SCNC: 140 MMOL/L (ref 136–145)
SODIUM SERPL-SCNC: 140 MMOL/L (ref 136–145)
SODIUM SERPL-SCNC: 141 MMOL/L (ref 136–145)
SODIUM SERPL-SCNC: 142 MMOL/L (ref 136–145)
TROPONIN I SERPL DL<=0.01 NG/ML-MCNC: 0.03 NG/ML (ref 0–0.03)
WBC # BLD AUTO: 10 10 (ref 4–10.2)
WBC # BLD AUTO: 10.02 K/UL (ref 3.9–12.7)
WBC # BLD AUTO: 7.96 K/UL (ref 3.9–12.7)
WBC # BLD AUTO: 8.32 K/UL (ref 3.9–12.7)
WBC # BLD AUTO: 8.68 K/UL (ref 3.9–12.7)
WBC # BLD AUTO: 9.13 K/UL (ref 3.9–12.7)
WBC # BLD AUTO: 9.13 K/UL (ref 3.9–12.7)
WBC # BLD AUTO: 9.41 K/UL (ref 3.9–12.7)
WBC # BLD AUTO: 9.9 10 (ref 4–10.2)

## 2020-01-01 PROCEDURE — 83880 ASSAY OF NATRIURETIC PEPTIDE: CPT

## 2020-01-01 PROCEDURE — 83735 ASSAY OF MAGNESIUM: CPT

## 2020-01-01 PROCEDURE — 36415 COLL VENOUS BLD VENIPUNCTURE: CPT

## 2020-01-01 PROCEDURE — 93005 ELECTROCARDIOGRAM TRACING: CPT

## 2020-01-01 PROCEDURE — 85025 COMPLETE CBC W/AUTO DIFF WBC: CPT

## 2020-01-01 PROCEDURE — 80048 BASIC METABOLIC PNL TOTAL CA: CPT

## 2020-01-01 PROCEDURE — 93010 ELECTROCARDIOGRAM REPORT: CPT | Mod: 76,,, | Performed by: INTERNAL MEDICINE

## 2020-01-01 PROCEDURE — 80053 COMPREHEN METABOLIC PANEL: CPT

## 2020-01-01 PROCEDURE — 99284 EMERGENCY DEPT VISIT MOD MDM: CPT | Mod: 25

## 2020-01-01 PROCEDURE — 84484 ASSAY OF TROPONIN QUANT: CPT

## 2020-01-01 PROCEDURE — 93010 EKG 12-LEAD: ICD-10-PCS | Mod: 76,,, | Performed by: INTERNAL MEDICINE

## 2020-01-01 PROCEDURE — 25000003 PHARM REV CODE 250: Performed by: EMERGENCY MEDICINE

## 2020-01-01 RX ORDER — INSULIN GLARGINE 300 U/ML
30 INJECTION, SOLUTION SUBCUTANEOUS DAILY
COMMUNITY

## 2020-01-01 RX ORDER — HYDROCODONE BITARTRATE AND ACETAMINOPHEN 5; 325 MG/1; MG/1
1 TABLET ORAL
Status: COMPLETED | OUTPATIENT
Start: 2020-01-01 | End: 2020-01-01

## 2020-01-01 RX ADMIN — HYDROCODONE BITARTRATE AND ACETAMINOPHEN 1 TABLET: 5; 325 TABLET ORAL at 04:11

## 2020-01-07 PROBLEM — Z78.9 PROBLEM WITH VASCULAR ACCESS: Status: ACTIVE | Noted: 2020-01-01

## 2020-01-11 PROBLEM — I24.89 ISCHEMIA DUE TO INCREASED OXYGEN DEMAND: Status: ACTIVE | Noted: 2020-01-01

## 2020-09-19 PROBLEM — T82.514A HICKMAN CATHETER DYSFUNCTION: Status: ACTIVE | Noted: 2020-01-01

## 2020-09-21 PROBLEM — I50.812 CHRONIC RIGHT-SIDED CONGESTIVE HEART FAILURE: Status: ACTIVE | Noted: 2019-08-31

## 2020-09-23 PROBLEM — R06.03 RESPIRATORY DISTRESS: Status: ACTIVE | Noted: 2020-01-01

## 2020-09-25 PROBLEM — J96.20 ACUTE ON CHRONIC RESPIRATORY FAILURE: Status: ACTIVE | Noted: 2020-01-01

## 2020-11-02 PROBLEM — R00.0 TACHYCARDIA: Status: ACTIVE | Noted: 2020-01-01

## 2020-11-02 NOTE — ED PROVIDER NOTES
Encounter Date: 11/2/2020       History     Chief Complaint   Patient presents with    Palpitations     started with heart palpitations 4 days ago.  I see Dr. Guerra at [facility] in [place] and he recommended I come straight to nearest hospital.  Reports intermittent CP and SOB.     66 yo male with myriad of medical problems here via POV with complaints of palpitations onset 3-4 days ago. Patient reports home health instructed him to go to ED. Patient reports CP and dyspnea at baseline. On 8L NC chronically at home. SOB with any exertion. On palliative dopamine infusion. Reports compliance with digoxin and amiodarone. Denies fever.         Review of patient's allergies indicates:   Allergen Reactions    Ibuprofen      Past Medical History:   Diagnosis Date    Acid reflux     Angina, class III     Anticoagulant long-term use     Anxiety and depression     Arthritis     Atherosclerosis of lower extremity with claudication     Bilateral carotid artery stenosis     CAD (coronary artery disease)     CAD (coronary artery disease) of artery bypass graft     Cataract     CHF (congestive heart failure)     Chronic hepatitis C     Chronic respiratory failure     COPD (chronic obstructive pulmonary disease)     Diabetes mellitus     Hepatitis     c    HHD (hypertensive heart disease)     [place] (hard of hearing)     Hypercholesteremia     Hypertension     Hypoxia     Kidney disorder     Neuropathy, lower extremity     Obstructive sleep apnea     PAF (paroxysmal atrial fibrillation)     Presence of stent in artery     Requires continuous at home supplemental oxygen     Syncope     Syncope and collapse      Past Surgical History:   Procedure Laterality Date    CARDIAC SURGERY      CABGx4    CATHETERIZATION OF BOTH LEFT AND RIGHT HEART N/A 8/29/2019    Procedure: CATHETERIZATION, HEART, BOTH LEFT AND RIGHT;  Surgeon: Eliu Kebede MD;  Location: [facility] CATH;  Service: Cardiology;  Laterality: N/A;     CHOLECYSTECTOMY      choley      COLONOSCOPY      CORONARY ARTERY BYPASS GRAFT      x 4    LEFT HEART CATHETERIZATION Left 2018    Procedure: Left heart cath;  Surgeon: Nicolás Pathak MD;  Location: [facility] CATH;  Service: Cardiovascular;  Laterality: Left;    PERIPHERALLY INSERTED CENTRAL CATHETER INSERTION N/A 2019    Procedure: INSERTION, PICC;  Surgeon: Dosc Diagnostic Provider;  Location: [facility] OR;  Service: General;  Laterality: N/A;  Ordering MD is Dr. Pierce Garcia 4S    PERIPHERALLY INSERTED CENTRAL CATHETER INSERTION N/A 2019    Procedure: INSERTION, PICC;  Surgeon: Dos Diagnostic Provider;  Location: [facility] OR;  Service: General;  Laterality: N/A;  for Dobutrex infusions per [facility] 8am arrival per [facility]    RIGHT HEART CATHETERIZATION Right 2019    Procedure: INSERTION, CATHETER, RIGHT HEART;  Surgeon: Price Guerra MD;  Location: [facility] CATH;  Service: Cardiology;  Laterality: Right;  dobutamine challenge    stent in artery      TREATMENT OF CARDIAC ARRHYTHMIA N/A 2019    Procedure: Cardioversion or Defibrillation;  Surgeon: Eliu Kebede MD;  Location: [facility] CATH;  Service: Cardiology;  Laterality: N/A;    UPPER GASTROINTESTINAL ENDOSCOPY       Family History   Problem Relation Age of Onset    Liver disease Father     Liver disease Sister      Social History     Tobacco Use    Smoking status: Former Smoker     Packs/day: 4.00     Years: 40.00     Pack years: 160.00     Types: Cigarettes     Start date:      Quit date: 2009     Years since quittin.4    Smokeless tobacco: Never Used   Substance Use Topics    Alcohol use: No    Drug use: No     Review of Systems   Constitutional: Positive for fatigue. Negative for fever.   Respiratory: Positive for shortness of breath.    Cardiovascular: Positive for palpitations.   All other systems reviewed and are negative.      Physical Exam     Initial Vitals   BP Pulse Resp Temp SpO2   20 1606  11/02/20 1600 11/02/20 1600 11/02/20 1600 11/02/20 1600   (!) 144/80 (!) 126 (!) 24 98.3 °F (36.8 °C) (!) 83 %      MAP       --                Physical Exam    Nursing note and vitals reviewed.  Constitutional: He is not diaphoretic. No distress.   HENT:   Head: Normocephalic and atraumatic.   Eyes: Conjunctivae are normal. Right eye exhibits no discharge. Left eye exhibits no discharge. No scleral icterus.   Neck: Normal range of motion. Neck supple.   Cardiovascular: Regular rhythm and intact distal pulses.   tachycardia   Pulmonary/Chest: No stridor. No respiratory distress. He has no wheezes. He has rales.   Abdominal: Soft. Bowel sounds are normal. He exhibits no distension. There is no abdominal tenderness.   Musculoskeletal: Edema present. No tenderness.   Neurological: He is alert and oriented to person, place, and time.   Skin: Skin is warm. Capillary refill takes less than 2 seconds. No rash noted. No erythema.         ED Course   Procedures  Labs Reviewed   CBC W/ AUTO DIFFERENTIAL - Abnormal; Notable for the following components:       Result Value    MCHC 31.3 (*)     RDW 16.2 (*)     Gran % 81.8 (*)     Lymph % 11.6 (*)     All other components within normal limits   COMPREHENSIVE METABOLIC PANEL - Abnormal; Notable for the following components:    Glucose 241 (*)     Creatinine 1.6 (*)     Calcium 8.4 (*)     Total Bilirubin 1.3 (*)     eGFR if  51.1 (*)     eGFR if non  44.2 (*)     All other components within normal limits   NT-PRO NATRIURETIC PEPTIDE - Abnormal; Notable for the following components:    NT-proBNP 1030 (*)     All other components within normal limits   TROPONIN I     EKG Readings: (Independently Interpreted)   Initial Reading: No STEMI. Rhythm: Atrial Flutter. Heart Rate: 126. Clinical Impression: Atrial Flutter       Imaging Results          X-Ray Chest AP Portable (In process)                  Medical Decision Making:   Clinical Tests:   Lab Tests:  Reviewed and Ordered  Medical Tests: Reviewed and Ordered  ED Management:  Discussed with Dr Hudson on call for cardiology. [place] [facility] [place] Gu[place]. Discussed with patient, he refuses transfer and wishes to sign out AMA.                             Clinical Impression:       ICD-10-CM ICD-9-CM   1. Palpitations  R00.2 785.1   2. Palpitations  R00.2 785.1                      Disposition:   Disposition: AMA  Condition: Fair     ED Disposition Condition    AMA                             Shaan Shea MD  11/02/20 4680

## 2020-11-04 PROBLEM — R00.0 TACHYCARDIA: Status: RESOLVED | Noted: 2020-01-01 | Resolved: 2020-01-01

## 2020-11-04 PROBLEM — I50.9 CONGESTIVE HEART FAILURE: Status: RESOLVED | Noted: 2019-11-08 | Resolved: 2020-01-01

## 2020-11-04 PROBLEM — Z78.9 KNOWLEDGE DEFICIT ABOUT THERAPEUTIC DIET: Status: RESOLVED | Noted: 2019-02-19 | Resolved: 2020-01-01

## 2020-11-16 PROBLEM — I20.0 UNSTABLE ANGINA: Status: ACTIVE | Noted: 2020-01-01

## 2020-11-17 PROBLEM — M25.511 RIGHT SHOULDER PAIN: Status: ACTIVE | Noted: 2020-01-01
